# Patient Record
Sex: FEMALE | Race: WHITE | NOT HISPANIC OR LATINO | Employment: FULL TIME | URBAN - METROPOLITAN AREA
[De-identification: names, ages, dates, MRNs, and addresses within clinical notes are randomized per-mention and may not be internally consistent; named-entity substitution may affect disease eponyms.]

---

## 2017-11-06 ENCOUNTER — TRANSCRIBE ORDERS (OUTPATIENT)
Dept: ADMINISTRATIVE | Age: 27
End: 2017-11-06

## 2017-11-06 ENCOUNTER — APPOINTMENT (OUTPATIENT)
Dept: LAB | Age: 27
End: 2017-11-06
Attending: PREVENTIVE MEDICINE

## 2017-11-06 DIAGNOSIS — Z00.8 HEALTH EXAMINATION IN POPULATION SURVEY: ICD-10-CM

## 2017-11-06 DIAGNOSIS — Z00.8 HEALTH EXAMINATION IN POPULATION SURVEY: Primary | ICD-10-CM

## 2017-11-06 PROCEDURE — 86480 TB TEST CELL IMMUN MEASURE: CPT

## 2017-11-06 PROCEDURE — 36415 COLL VENOUS BLD VENIPUNCTURE: CPT

## 2017-11-08 LAB
ANNOTATION COMMENT IMP: NORMAL
GAMMA INTERFERON BACKGROUND BLD IA-ACNC: 0.04 IU/ML
M TB IFN-G BLD-IMP: NEGATIVE
M TB IFN-G CD4+ BCKGRND COR BLD-ACNC: 0.03 IU/ML
M TB IFN-G CD4+ T-CELLS BLD-ACNC: 0.07 IU/ML
MITOGEN IGNF BLD-ACNC: 9.66 IU/ML
QUANTIFERON-TB GOLD IN TUBE: NORMAL
SERVICE CMNT-IMP: NORMAL

## 2018-04-02 ENCOUNTER — OFFICE VISIT (OUTPATIENT)
Dept: FAMILY MEDICINE CLINIC | Facility: OTHER | Age: 28
End: 2018-04-02
Payer: COMMERCIAL

## 2018-04-02 VITALS
WEIGHT: 199.3 LBS | OXYGEN SATURATION: 98 % | HEART RATE: 69 BPM | SYSTOLIC BLOOD PRESSURE: 128 MMHG | HEIGHT: 68 IN | DIASTOLIC BLOOD PRESSURE: 80 MMHG | BODY MASS INDEX: 30.2 KG/M2 | TEMPERATURE: 99 F

## 2018-04-02 DIAGNOSIS — L30.9 ECZEMA, UNSPECIFIED TYPE: ICD-10-CM

## 2018-04-02 DIAGNOSIS — Z76.89 ENCOUNTER TO ESTABLISH CARE: Primary | ICD-10-CM

## 2018-04-02 PROBLEM — Z86.018 HISTORY OF BENIGN BREAST TUMOR: Status: ACTIVE | Noted: 2018-04-02

## 2018-04-02 PROCEDURE — 3008F BODY MASS INDEX DOCD: CPT | Performed by: FAMILY MEDICINE

## 2018-04-02 PROCEDURE — 99203 OFFICE O/P NEW LOW 30 MIN: CPT | Performed by: FAMILY MEDICINE

## 2018-04-02 PROCEDURE — 1036F TOBACCO NON-USER: CPT | Performed by: FAMILY MEDICINE

## 2018-04-02 RX ORDER — TRIAMCINOLONE ACETONIDE 0.25 MG/G
OINTMENT TOPICAL 2 TIMES DAILY
Qty: 80 G | Refills: 0 | Status: SHIPPED | OUTPATIENT
Start: 2018-04-02 | End: 2018-07-03 | Stop reason: ALTCHOICE

## 2018-04-02 NOTE — PROGRESS NOTES
Assessment/Plan:    Patient info provided today  Discussed about avoiding the possible irritant  Advised to use mild soap and keep hands moisturized  Use the ointment as directed for 1-2 weeks only  May use OTC antihistamine for the itching and rash as well  Follow up prn  Problem List Items Addressed This Visit     None      Visit Diagnoses     Encounter to establish care    -  Primary    Eczema, unspecified type        Relevant Medications    triamcinolone (KENALOG) 0 025 % ointment            Subjective:      Patient ID: Keiko Macario is a 34 y o  female  Pt is here to establish care today  She has been pretty healthy other than having history of breast benign tumor in the past that was surgically removed  She has regular periods and doing well otherwise  She has sensitive skin and lately has noted rash on hands and in between finger that is very itchy  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  She noted the more she uses the scrub that she gets more of this rash  It's only confined to her hands bilaterally  She has no fever or pain in the hands  There is no vesicles or open wounds  The following portions of the patient's history were reviewed and updated as appropriate: allergies, current medications, past family history, past medical history, past social history, past surgical history and problem list     Review of Systems   Constitutional: Negative for chills, fatigue and fever  HENT: Negative for congestion  Eyes: Negative for visual disturbance  Respiratory: Negative for cough and wheezing  Cardiovascular: Negative for chest pain and leg swelling  Gastrointestinal: Negative for abdominal pain, nausea and vomiting  Musculoskeletal: Negative for arthralgias and myalgias  Skin: Positive for rash  Negative for pallor and wound  Allergic/Immunologic: Positive for environmental allergies     Neurological: Negative for dizziness, light-headedness and headaches  Objective:      /80 (BP Location: Left arm, Patient Position: Sitting, Cuff Size: Adult)   Pulse 69   Temp 99 °F (37 2 °C) (Tympanic)   Ht 5' 8" (1 727 m)   Wt 90 4 kg (199 lb 4 8 oz)   SpO2 98%   BMI 30 30 kg/m²          Physical Exam   Constitutional: She is oriented to person, place, and time  She appears well-developed and well-nourished  No distress  HENT:   Head: Normocephalic and atraumatic  Eyes: Conjunctivae are normal  Right eye exhibits no discharge  Left eye exhibits no discharge  No scleral icterus  Cardiovascular: Normal rate, regular rhythm and normal heart sounds  No murmur heard  Pulmonary/Chest: Effort normal and breath sounds normal  No respiratory distress  She has no wheezes  Abdominal: Soft  Bowel sounds are normal  She exhibits no distension  There is no tenderness  Neurological: She is alert and oriented to person, place, and time  No cranial nerve deficit  Skin: Skin is warm and dry  Rash noted  She is not diaphoretic  There is maculopapular rash patches on dorsum of hands and in between fingers bilaterally  The rash is mild  No vesicles or pustules noted  Psychiatric: She has a normal mood and affect  Her behavior is normal    Nursing note and vitals reviewed

## 2018-04-02 NOTE — PATIENT INSTRUCTIONS
Contact Dermatitis   AMBULATORY CARE:   Contact dermatitis  is a rash  It develops when you touch something that irritates your skin or causes an allergic reaction  Common signs and symptoms include the following:   · Red, swollen, painful rash           · Skin that itches, stings, or burns    · Dry, scaly, or crusty skin patches    · Bumps or blisters    · Fluid draining from blisters  Call 911 for any of the following:   · You have sudden trouble breathing  · Your throat swells and you have trouble eating  · Your face is swollen  Contact your healthcare provider if:   · You have a fever  · Your blisters are draining pus  · Your rash spreads or does not get better, even after treatment  · You have questions or concerns about your condition or care  Treatment for contact dermatitis  involves removing any irritants or allergens that cause your rash  You may also need medicines to decrease itching and swelling  They will be given as a topical medicine to apply to your rash or as a pill  Manage contact dermatitis:   · Take short baths or showers in cool water  Use mild soap or soap-free cleansers  Add oatmeal, baking soda, or cornstarch to the bath water to help decrease skin irritation  · Avoid skin irritants , such as makeup, hair products, soaps, and cleansers  Use products that do not contain perfume or dye  · Apply a cool compress to your rash  This will help soothe your skin  · Keep your skin moist   Rub unscented cream or lotion on your skin to prevent dryness and itching  Do this right after a bath or shower when your skin is still damp  Follow up with your healthcare provider as directed:  Write down your questions so you remember to ask them during your visits  © 2017 [employer] Information is for End User's use only and may not be sold, redistributed or otherwise used for commercial purposes   All illustrations and images included in CareNotes® are the copyrighted property of [employer]  or Felice Boykin  The above information is an  only  It is not intended as medical advice for individual conditions or treatments  Talk to your doctor, nurse or pharmacist before following any medical regimen to see if it is safe and effective for you

## 2018-05-02 ENCOUNTER — TELEPHONE (OUTPATIENT)
Dept: FAMILY MEDICINE CLINIC | Facility: OTHER | Age: 28
End: 2018-05-02

## 2018-05-03 NOTE — TELEPHONE ENCOUNTER
She has already had the test in 11/2017 with normal and negative results  Does she need it repeated? Please let me know  Thanks

## 2018-05-07 ENCOUNTER — CLINICAL SUPPORT (OUTPATIENT)
Dept: FAMILY MEDICINE CLINIC | Facility: OTHER | Age: 28
End: 2018-05-07
Payer: COMMERCIAL

## 2018-05-07 DIAGNOSIS — Z11.1 PPD SCREENING TEST: Primary | ICD-10-CM

## 2018-05-07 PROCEDURE — 86580 TB INTRADERMAL TEST: CPT

## 2018-05-10 LAB
INDURATION: NORMAL MM
TB SKIN TEST: NEGATIVE

## 2018-07-03 ENCOUNTER — TELEPHONE (OUTPATIENT)
Dept: FAMILY MEDICINE CLINIC | Facility: OTHER | Age: 28
End: 2018-07-03

## 2018-07-03 DIAGNOSIS — L30.8 OTHER ECZEMA: Primary | ICD-10-CM

## 2018-07-03 RX ORDER — CLOBETASOL PROPIONATE 0.5 MG/G
CREAM TOPICAL 2 TIMES DAILY
Qty: 30 G | Refills: 0 | Status: SHIPPED | OUTPATIENT
Start: 2018-07-03 | End: 2021-06-24 | Stop reason: HOSPADM

## 2018-07-03 NOTE — TELEPHONE ENCOUNTER
Please advise the  Patient to stop using the first medication and to use the Clobetasol as directed  Thanks

## 2019-01-27 ENCOUNTER — APPOINTMENT (EMERGENCY)
Dept: RADIOLOGY | Facility: HOSPITAL | Age: 29
End: 2019-01-27
Payer: COMMERCIAL

## 2019-01-27 ENCOUNTER — HOSPITAL ENCOUNTER (EMERGENCY)
Facility: HOSPITAL | Age: 29
Discharge: HOME | End: 2019-01-27
Payer: COMMERCIAL

## 2019-01-27 VITALS
OXYGEN SATURATION: 98 % | RESPIRATION RATE: 18 BRPM | DIASTOLIC BLOOD PRESSURE: 65 MMHG | SYSTOLIC BLOOD PRESSURE: 137 MMHG | WEIGHT: 170 LBS | BODY MASS INDEX: 25.85 KG/M2 | HEART RATE: 77 BPM | TEMPERATURE: 98.3 F

## 2019-01-27 DIAGNOSIS — S89.91XA INJURY OF RIGHT KNEE, INITIAL ENCOUNTER: Primary | ICD-10-CM

## 2019-01-27 PROCEDURE — 73564 X-RAY EXAM KNEE 4 OR MORE: CPT

## 2019-01-27 PROCEDURE — 99283 EMERGENCY DEPT VISIT LOW MDM: CPT

## 2019-01-27 RX ORDER — NAPROXEN 500 MG/1
500 TABLET ORAL 2 TIMES DAILY WITH MEALS
Qty: 20 TABLET | Refills: 0 | Status: SHIPPED | OUTPATIENT
Start: 2019-01-27 | End: 2019-09-19 | Stop reason: ALTCHOICE

## 2019-01-27 NOTE — ED PROVIDER NOTES
History  Chief Complaint   Patient presents with    Knee Pain     Pt was dancing last night and injuried her R knee     Patient is a 51-year-old female reported to emergency room with complaint of right knee injury after she was dancing last night  Patient stated that she kneeled down on her both knees and she was coming up she heard a right knee popping  Had immediately after that she experience pain  Pain is sharp stabbing located to the medial aspect of her right knee  There is no swelling, erythema or warmth  Tenderness to palpation reported  Patient is limping however is able to ambulate  Patellar landmarks maintained  No pain in the posterior aspect of the knee or on the lateral aspect  Denies paresthesia, numbness, tingling to upper lower extremities  No posterior cuff tenderness, erythema or swelling  Past medical history noncontributory  Stable while in ED  X-ray shows no acute abnormalities  Ibuprofen or Tylenol for pain control  Knee immobilizer applied  Patient is walking and pain improved  Orthopedic referral provided as needed  Prior to Admission Medications   Prescriptions Last Dose Informant Patient Reported? Taking? clobetasol (TEMOVATE) 0 05 % cream   No Yes   Sig: Apply topically 2 (two) times a day      Facility-Administered Medications: None       Past Medical History:   Diagnosis Date    Eczema        Past Surgical History:   Procedure Laterality Date    BREAST SURGERY Left 2008       Family History   Problem Relation Age of Onset    Nephrolithiasis Mother     Hypertension Father     Obesity Father     Depression Father      I have reviewed and agree with the history as documented  Social History   Substance Use Topics    Smoking status: Former Smoker    Smokeless tobacco: Former User    Alcohol use Yes      Comment: social         Review of Systems   Constitutional: Negative  Respiratory: Negative  Cardiovascular: Negative  Gastrointestinal: Negative  Musculoskeletal: Positive for arthralgias and gait problem  Negative for joint swelling, neck pain and neck stiffness  Skin: Negative  Neurological: Negative for dizziness, weakness, light-headedness and numbness  Physical Exam  Physical Exam   Constitutional: She is oriented to person, place, and time  She appears well-developed and well-nourished  No distress  HENT:   Head: Normocephalic  Eyes: Pupils are equal, round, and reactive to light  EOM are normal    Neck: Normal range of motion  Cardiovascular: Normal rate and regular rhythm  Pulmonary/Chest: Effort normal and breath sounds normal    Abdominal: Soft  Musculoskeletal: Normal range of motion  She exhibits tenderness  She exhibits no edema or deformity  Neurological: She is alert and oriented to person, place, and time  She displays normal reflexes  No sensory deficit  She exhibits normal muscle tone  Skin: Skin is warm and dry  No rash noted  No erythema  Vital Signs  ED Triage Vitals [01/27/19 0835]   Temperature Pulse Respirations Blood Pressure SpO2   98 3 °F (36 8 °C) 77 18 137/65 98 %      Temp Source Heart Rate Source Patient Position - Orthostatic VS BP Location FiO2 (%)   Tympanic Monitor Sitting Left arm --      Pain Score       7           Vitals:    01/27/19 0835   BP: 137/65   Pulse: 77   Patient Position - Orthostatic VS: Sitting       Visual Acuity      ED Medications  Medications - No data to display    Diagnostic Studies  Results Reviewed     None                 XR knee 4+ vw right injury   ED Interpretation by Tamia Lugo PA-C (01/27 0920)   No acute fractures or dislocations                 Procedures  Procedures       Phone Contacts  ED Phone Contact    ED Course                               MDM  Number of Diagnoses or Management Options  Injury of right knee, initial encounter:   Diagnosis management comments: X-ray of the right knee pending    Examination unremarkable for fluid collection, joint infection  Supportive care such as ice, elevation rest recommended  Knee immobilizer provided  Ibuprofen or Tylenol for pain and discomfort recommended  Patient refused naproxen     Follow-up with orthopedics as needed  Amount and/or Complexity of Data Reviewed  Tests in the radiology section of CPT®: ordered and reviewed    Patient Progress  Patient progress: improved    CritCare Time    Disposition  Final diagnoses:   Injury of right knee, initial encounter     Time reflects when diagnosis was documented in both MDM as applicable and the Disposition within this note     Time User Action Codes Description Comment    1/27/2019  9:22 AM aDrrion Reynoso 81, Karen Injury of right knee, initial encounter       ED Disposition     ED Disposition Condition Comment    Discharge  [address] discharge to home/self care  Condition at discharge: Good  Follow-up with Orthopedics for further evaluation and management of worsening symptoms or no improvement in the next 5-7 days  Use knee immobilizer for no longer than 7 days  Ice , rest, elevation recommended  Take naproxen twice a day for total of 5-6 days or as needed  Take medication with food not an empty stomach  Return to emergency room with new or worsening symptoms  Follow-up Information     Follow up With Specialties Details Why [address], [place] Sports Medicine, Orthopedic Surgery In 1 week            Discharge Medication List as of 1/27/2019  9:25 AM      START taking these medications    Details   naproxen (NAPROSYN) 500 mg tablet Take 1 tablet (500 mg total) by mouth 2 (two) times a day with meals for 10 days, Starting Sun 1/27/2019, Until Wed 2/6/2019, Normal         CONTINUE these medications which have NOT CHANGED    Details   clobetasol (TEMOVATE) 0 05 % cream Apply topically 2 (two) times a day, Starting Tue 7/3/2018, Normal           No discharge procedures on file      ED Provider  Electronically Signed by           Trecia Gottron, PA-C  01/27/19 1111

## 2019-01-27 NOTE — ED NOTES
Jana Goel RN placed knee immobilizer on pt R leg     Jonas Allison, [place]  01/27/19 2122

## 2019-07-16 ENCOUNTER — TRANSCRIBE ORDERS (OUTPATIENT)
Dept: LAB | Facility: HOSPITAL | Age: 29
End: 2019-07-16

## 2019-07-16 ENCOUNTER — APPOINTMENT (OUTPATIENT)
Dept: LAB | Facility: HOSPITAL | Age: 29
End: 2019-07-16
Payer: COMMERCIAL

## 2019-07-16 DIAGNOSIS — Z00.8 HEALTH EXAMINATION IN POPULATION SURVEY: ICD-10-CM

## 2019-07-16 DIAGNOSIS — Z00.8 HEALTH EXAMINATION IN POPULATION SURVEY: Primary | ICD-10-CM

## 2019-07-16 LAB
CHOLEST SERPL-MCNC: 125 MG/DL (ref 50–200)
HDLC SERPL-MCNC: 59 MG/DL (ref 40–60)
LDLC SERPL CALC-MCNC: 50 MG/DL (ref 0–100)
NONHDLC SERPL-MCNC: 66 MG/DL
TRIGL SERPL-MCNC: 78 MG/DL

## 2019-07-16 PROCEDURE — 36415 COLL VENOUS BLD VENIPUNCTURE: CPT

## 2019-07-16 PROCEDURE — 83036 HEMOGLOBIN GLYCOSYLATED A1C: CPT

## 2019-07-16 PROCEDURE — 80061 LIPID PANEL: CPT

## 2019-07-17 LAB
EST. AVERAGE GLUCOSE BLD GHB EST-MCNC: 108 MG/DL
HBA1C MFR BLD: 5.4 % (ref 4.2–6.3)

## 2019-09-18 ENCOUNTER — TELEPHONE (OUTPATIENT)
Dept: FAMILY MEDICINE CLINIC | Facility: CLINIC | Age: 29
End: 2019-09-18

## 2019-09-18 NOTE — TELEPHONE ENCOUNTER
----- Message from [place] John DO sent at 9/18/2019  2:27 PM EDT -----  This patient has appointment with us and Aishwarya Frank tomorrow - please confirm where she is going

## 2019-09-18 NOTE — TELEPHONE ENCOUNTER
I left a message for the patient to please call the office regarding this appointment and that we are here until 7 this evening to call back

## 2019-09-19 ENCOUNTER — OFFICE VISIT (OUTPATIENT)
Dept: FAMILY MEDICINE CLINIC | Facility: CLINIC | Age: 29
End: 2019-09-19
Payer: COMMERCIAL

## 2019-09-19 ENCOUNTER — LAB (OUTPATIENT)
Dept: LAB | Facility: CLINIC | Age: 29
End: 2019-09-19
Payer: COMMERCIAL

## 2019-09-19 VITALS
TEMPERATURE: 98.3 F | BODY MASS INDEX: 26.92 KG/M2 | SYSTOLIC BLOOD PRESSURE: 104 MMHG | HEART RATE: 55 BPM | OXYGEN SATURATION: 98 % | WEIGHT: 177.6 LBS | HEIGHT: 68 IN | DIASTOLIC BLOOD PRESSURE: 68 MMHG

## 2019-09-19 DIAGNOSIS — Z12.4 CERVICAL CANCER SCREENING: ICD-10-CM

## 2019-09-19 DIAGNOSIS — M79.10 MYALGIA: ICD-10-CM

## 2019-09-19 DIAGNOSIS — E55.9 VITAMIN D DEFICIENCY: ICD-10-CM

## 2019-09-19 DIAGNOSIS — Z00.00 WELL ADULT EXAM: Primary | ICD-10-CM

## 2019-09-19 LAB — 25(OH)D3 SERPL-MCNC: 21.9 NG/ML (ref 30–100)

## 2019-09-19 PROCEDURE — 82306 VITAMIN D 25 HYDROXY: CPT

## 2019-09-19 PROCEDURE — 99385 PREV VISIT NEW AGE 18-39: CPT | Performed by: FAMILY MEDICINE

## 2019-09-19 PROCEDURE — 36415 COLL VENOUS BLD VENIPUNCTURE: CPT

## 2019-09-19 PROCEDURE — 86618 LYME DISEASE ANTIBODY: CPT

## 2019-09-19 NOTE — PATIENT INSTRUCTIONS
Please send us the last Tdap or Td date  Folic Acid recommendations  Folic acid is important early in pregnancy for the prevention of neural tube defects  It is recommended to take 400-800 mg daily while trying to get pregnant and throughout pregnancy  This is included in prenatal vitamins or you can buy a separate supplement if you are taking a regular multivitamin  or a pre- vitamin

## 2019-09-19 NOTE — PROGRESS NOTES
Assessment/Plan:   Problems/concerns addressed in this visit are addressed in the attached note  1  Well adult exam    2  Cervical cancer screening  Refer to GYN  She is SA  Is is not using any form of BC at this time but is not interested in anything with hormones  Did review Copper IUD if she was interested  Do recommend she take folic acid if not on [facility]    - Ambulatory referral to Gynecology; Future    3  Myalgia  Check labs   - Lyme Antibody Profile with reflex to WB; Future  - Vitamin D 25 hydroxy; Future    4  Vitamin D deficiency  Check labs   - Cholecalciferol (VITAMIN D3) 5000 units CAPS; Take 1 capsule (5,000 Units total) by mouth daily Over the counter  - Vitamin D 25 hydroxy; Future    Well adult exam  ·         Continue healthy diet   ·         Encourage exercise 4 times a week or more for minimum 30 minutes  ·         Continue to see dentist, wear seatbelt  ·         Health maintenance reviewed and up-to-date - she will get us the date of her last tetanus shot that she believes was a few years ago  Reviewed age appropriate health maintenance screenings and immunizations that are due, risks and benefits of these  Health Maintenance   Topic Date Due    BMI: Followup Plan  01/14/2010    DTaP,Tdap,and Td Vaccines (1 - Tdap) 01/14/2013    PAP SMEAR  01/14/2013    INFLUENZA VACCINE  07/01/2019    Depression Screening PHQ  09/19/2020    BMI: Adult  09/19/2020    Pneumococcal Vaccine: 65+ Years (1 of 2 - PCV13) 01/14/2057    Pneumococcal Vaccine: Pediatrics (0 to 5 Years) and At-Risk Patients (6 to 59 Years)  Aged Out    HEPATITIS B VACCINES  Aged Out     No follow-ups on file  Patient Instructions   Please send us the last Tdap or Td date  Folic Acid recommendations  Folic acid is important early in pregnancy for the prevention of neural tube defects  It is recommended to take 400-800 mg daily while trying to get pregnant and throughout pregnancy   This is included in prenatal vitamins or you can buy a separate supplement if you are taking a regular multivitamin  or a pre- vitamin  Subjective:    HPI     Wilder Braga is a 34 y o  female who presents today for a physical      Chief Complaint   Patient presents with    Back Pain     Patient said goes to the gym and thinks may have hurt is some time ago x 1 mo  Patient said is getting better but still bothers her   lymes disease     Patient said has hx/o lymes given treatment with negative repeat labs  Patient said was on vacation and want to be sure not lymes again  ---Above per clinical staff & reviewed  ---    Diet: healthy - yogurt and fruit, salad, chicken and potatoes   Exercise:  Cardio and weight training,   [employer]   Dental visits:  Yes   Seatbelt: yes     Concerns today: addressed in additional note today   In past year lost 25 pounds   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[facility] [employer]   No children   2 cats   OR nurse at [employer]     Uses clobetasol for scrubbing on hands   eucrissa also   Uses as needed   Derm in past     Last pap was about 4 -5 years ago - has had 3 normals  Normal periods - heavy cramps one day only     Strained her mid back a month ago - started feeling better almost when she made the appointment   Stiffness   If extended her neck or looked up sharp shooting pain   Laying back almost took her breath away  Concerned about Lyme disease   That was much worse pain           The following portions of the patient's history were reviewed and updated as appropriate: allergies, current medications, past family history, past medical history, past social history, past surgical history and problem list      Current Outpatient Medications   Medication Sig Dispense Refill    clobetasol (TEMOVATE) 0 05 % cream Apply topically 2 (two) times a day 30 g 0    Cholecalciferol (VITAMIN D3) 5000 units CAPS Take 1 capsule (5,000 Units total) by mouth daily Over the counter       No current facility-administered medications for this visit  Objective:      /68   Pulse 55   Temp 98 3 °F (36 8 °C)   Ht 5' 8 43" (1 738 m)   Wt 80 6 kg (177 lb 9 6 oz)   SpO2 98%   BMI 26 67 kg/m²     BP Readings from Last 3 Encounters:   09/19/19 104/68   01/27/19 137/65   04/02/18 128/80     Wt Readings from Last 3 Encounters:   09/19/19 80 6 kg (177 lb 9 6 oz)   01/27/19 77 1 kg (170 lb)   04/02/18 90 4 kg (199 lb 4 8 oz)       Review of Systems  all others negative - no chest pain, SOB, normal urine and bowels  no GERD  sleeping well  mood good  Physical Exam   Constitutional: Appears well-developed and well-nourished  HENT: Head: Normocephalic  Sclera clear  Right Ear: External ear normal  Tympanic membrane normal    Left Ear: External ear normal  Tympanic membrane normal    Nose: Nose normal  No mucosal edema, No rhinorrhea  Mouth/Throat: Oropharynx is clear and moist   Eyes: Normal conjunctiva  No erythema  No discharge  Neck: Neck supple  No thyromegaly  Cardiovascular: Normal rate, regular rhythm and normal heart sounds  No murmur  Pulmonary/Chest: Effort normal and breath sounds normal  No wheezes, rales, or rhonchi  Abdominal: Soft  Bowel sounds are normal  There is no tenderness  No hepatosplenomegaly  Musculoskeletal: No lower extremity edema  Lymphadenopathy: No cervical adenopathy  Neurological: Alert and oriented to person, place, and time  Skin: Skin is warm and dry  Psychiatric: Normal mood and affect   Behavior is normal  Thought content normal

## 2019-09-20 LAB — B BURGDOR IGG+IGM SER-ACNC: <0.91 ISR (ref 0–0.9)

## 2019-09-20 RX ORDER — MAG HYDROX/ALUMINUM HYD/SIMETH 400-400-40
5000 SUSPENSION, ORAL (FINAL DOSE FORM) ORAL DAILY
Start: 2019-09-20 | End: 2021-06-24 | Stop reason: HOSPADM

## 2019-09-20 NOTE — RESULT ENCOUNTER NOTE
Call pt- her lyme is negative  Her vitamin D is low   She can start over the counter vitamin D 5,000 iu daily and repeat lab check in 3 months or so to ensure it is back up to normal

## 2019-10-19 ENCOUNTER — OFFICE VISIT (OUTPATIENT)
Dept: URGENT CARE | Facility: CLINIC | Age: 29
End: 2019-10-19
Payer: COMMERCIAL

## 2019-10-19 VITALS
BODY MASS INDEX: 25.92 KG/M2 | RESPIRATION RATE: 18 BRPM | OXYGEN SATURATION: 98 % | DIASTOLIC BLOOD PRESSURE: 65 MMHG | HEART RATE: 72 BPM | HEIGHT: 69 IN | TEMPERATURE: 98.6 F | SYSTOLIC BLOOD PRESSURE: 129 MMHG | WEIGHT: 175 LBS

## 2019-10-19 DIAGNOSIS — K11.20 PAROTITIS: Primary | ICD-10-CM

## 2019-10-19 DIAGNOSIS — R59.9 SWOLLEN GLAND: ICD-10-CM

## 2019-10-19 PROCEDURE — 99213 OFFICE O/P EST LOW 20 MIN: CPT | Performed by: NURSE PRACTITIONER

## 2019-10-19 PROCEDURE — S9088 SERVICES PROVIDED IN URGENT: HCPCS | Performed by: NURSE PRACTITIONER

## 2019-10-19 NOTE — PATIENT INSTRUCTIONS
Warm salt water gargles  Ibuprofen as needed  Increase fluids  Sour hard candies to stimulate parotid glands as discussed  Will check Mumps swab as discussed     Follow up with PCP in 2-3 days

## 2019-10-19 NOTE — PROGRESS NOTES
[address]        NAME: Zuleyma Morley is a 34 y o  female  : 1990    MRN: [id number]  DATE: 2019  TIME: 9:49 AM    Assessment and Plan   1  Parotitis  - MISCELLANEOUS LAB TEST  Low suspicion of mumps but obtained buccal pcr swab  Patient Instructions     Warm salt water gargles  Ibuprofen as needed  Increase fluids  Sour hard candies to stimulate parotid glands as discussed  Will check Mumps swab as discussed  Follow up with PCP in 2-3 days        Chief Complaint     Chief Complaint   Patient presents with    Swollen Glands     yesterday under the right jaw line, pain  Pain with chewing  no other feelings of unwell except a headache, no fevers   took ibuprofen at 0430 today last           History of Present Illness       Here for swollen glands right jaw line  Slightly painful and more swollen today  Started yesterday  No fever    No headache  No recent illness   No rash  Mild fatigue  No other symptoms except swollen glands  Works in [employer]  Has had MMR vaccine  Had low titers 4 years ago and received booster vaccine  Feels fine but would like to have Mumps testing due to recent mumps outbreak        Review of Systems   Review of Systems   Constitutional: Positive for fatigue ("maybe slight")  Negative for fever  HENT: Negative for congestion and sore throat  Respiratory: Negative for cough and shortness of breath  Gastrointestinal: Negative for diarrhea, nausea and vomiting  Skin: Negative for rash  Allergic/Immunologic: Negative for immunocompromised state  Neurological: Negative for dizziness and headaches  Hematological: Positive for adenopathy           Current Medications       Current Outpatient Medications:     Cholecalciferol (VITAMIN D3) 5000 units CAPS, Take 1 capsule (5,000 Units total) by mouth daily Over the counter, Disp: , Rfl:     clobetasol (TEMOVATE) 0 05 % cream, Apply topically 2 (two) times a day, Disp: 30 g, Rfl: 0    Current Allergies     Allergies as of 10/19/2019    (No Known Allergies)            The following portions of the patient's history were reviewed and updated as appropriate: allergies, current medications, past family history, past medical history, past social history, past surgical history and problem list      Past Medical History:   Diagnosis Date    Eczema        Past Surgical History:   Procedure Laterality Date    BREAST SURGERY Left 2008    benign mass age 14        Family History   Problem Relation Age of Onset    Nephrolithiasis Mother     Hypertension Father     Obesity Father     Depression Father     Hypotension Maternal Grandfather     Melanoma Maternal Grandfather     Stroke Paternal Grandmother 79    Hypertension Paternal Grandmother     Heart disease Paternal Grandmother     Heart attack Paternal Grandfather 79         Medications have been verified  Objective   /65   Pulse 72   Temp 98 6 °F (37 °C) (Oral)   Resp 18   Ht 5' 9" (1 753 m)   Wt 79 4 kg (175 lb)   LMP 10/18/2019   SpO2 98%   BMI 25 84 kg/m²        Physical Exam     Physical Exam   Constitutional: She is oriented to person, place, and time  She appears well-developed and well-nourished  No distress  HENT:   Nose: Nose normal    Mouth/Throat: Oropharynx is clear and moist    Neck:   Enlarged , mildly tender right parotid gland   Cardiovascular: Normal rate and regular rhythm  Pulmonary/Chest: Effort normal and breath sounds normal  No respiratory distress  She has no wheezes  Neurological: She is alert and oriented to person, place, and time  Skin: Skin is warm and dry  No rash noted  No erythema  Vitals reviewed

## 2019-10-21 ENCOUNTER — PATIENT MESSAGE (OUTPATIENT)
Dept: FAMILY MEDICINE CLINIC | Facility: CLINIC | Age: 29
End: 2019-10-21

## 2019-10-21 NOTE — TELEPHONE ENCOUNTER
From: Ceci Gordon  To: Gentry BudDO  Sent: 10/21/2019 8:50 AM EDT  Subject: Non-Urgent Sachin Garza,    Since Friday the 18th, I have had enlarged salivary glands on the right side of my face  I went to [facility] on Saturday  I do not have a fever  I only have 10/10 pain when chewing or eating, and headaches  They took viral culture swabs to rule out Mumps  They NP I met with thinks it is just Parotitis  I am unsure when the facial swelling will subside  I called out of work today, worried people would think I have something more than just Parotitis  Also, the facial swelling is quite embarrassing  I have been doing what the NP has prescribed which is taking Ibuprofen, increasing fluids, and eating sour candies  My question is what is your recommendations about going to work? Should I stay home or is it safe to go into work? If I need to stay home for a few days I will need a doctors note from you  Please let me know  Thank you

## 2019-10-24 ENCOUNTER — TELEPHONE (OUTPATIENT)
Dept: FAMILY MEDICINE CLINIC | Facility: CLINIC | Age: 29
End: 2019-10-24

## 2019-10-24 LAB — MISCELLANEOUS LAB TEST RESULT: NORMAL

## 2019-10-24 NOTE — TELEPHONE ENCOUNTER
Received call from [place] dept of health  Buccal swab for mumps (+)  Left message for pt to return call  Called to [facility] and spoke with Ly Woods ([phone])  Advised of (+)  Pt developed symptoms 10/18  Seen in [facility] 10/19  Employee in [address] [place] [address]  Pt had advised that she had low titers in past and did receive booster approximately 4 years ago     Employee health will follow up

## 2020-06-27 ENCOUNTER — OFFICE VISIT (OUTPATIENT)
Dept: URGENT CARE | Facility: CLINIC | Age: 30
End: 2020-06-27
Payer: COMMERCIAL

## 2020-06-27 VITALS
BODY MASS INDEX: 26.81 KG/M2 | RESPIRATION RATE: 20 BRPM | HEIGHT: 69 IN | HEART RATE: 82 BPM | WEIGHT: 181 LBS | DIASTOLIC BLOOD PRESSURE: 59 MMHG | TEMPERATURE: 96.6 F | SYSTOLIC BLOOD PRESSURE: 121 MMHG

## 2020-06-27 DIAGNOSIS — M79.671 RIGHT FOOT PAIN: Primary | ICD-10-CM

## 2020-06-27 PROCEDURE — G0382 LEV 3 HOSP TYPE B ED VISIT: HCPCS | Performed by: PHYSICIAN ASSISTANT

## 2021-02-05 ENCOUNTER — ULTRASOUND (OUTPATIENT)
Dept: OBGYN CLINIC | Facility: CLINIC | Age: 31
End: 2021-02-05
Payer: COMMERCIAL

## 2021-02-05 VITALS — SYSTOLIC BLOOD PRESSURE: 112 MMHG | WEIGHT: 192 LBS | BODY MASS INDEX: 28.35 KG/M2 | DIASTOLIC BLOOD PRESSURE: 60 MMHG

## 2021-02-05 DIAGNOSIS — N91.2 AMENORRHEA: Primary | ICD-10-CM

## 2021-02-05 PROCEDURE — 99214 OFFICE O/P EST MOD 30 MIN: CPT | Performed by: OBSTETRICS & GYNECOLOGY

## 2021-02-05 PROCEDURE — 76830 TRANSVAGINAL US NON-OB: CPT | Performed by: OBSTETRICS & GYNECOLOGY

## 2021-02-08 NOTE — PROGRESS NOTES
Assessment/Plan:     @ 8 weeks 2 days by LMP [facility] 9/15/2021    Possible uterine myoma picture loaded in media; will re-evaluate at [facility] 12 week sono    Schedule NOB intake and physical      Subjective      Dorita Weiss is a 36 y o   LMP 2020 who presents with amenorrhea and positive pregnancy test       Cycle length: regular 28d  Pregnancy testing: at home  Pregnancy imaging: not done  The following portions of the patient's history were reviewed and updated as appropriate: allergies, current medications, past family history, past medical history, past social history, past surgical history and problem list     Review of Systems  Pertinent items are noted in HPI  Objective      /60 (BP Location: Left arm, Patient Position: Sitting, Cuff Size: Large)   Wt 87 1 kg (192 lb)   LMP 2020   BMI 28 35 kg/m²     General: alert and oriented, in no acute distress   Heart: regular rate and rhythm, S1, S2 normal, no murmur, click, rub or gallop   Lungs: clear to auscultation bilaterally   Abdomen: soft, non-tender, without masses or organomegaly     AMB US Pelvic Non OB    Date/Time: 2021 5:00 PM  Performed by: Bebo Raphael MD  Authorized by: Bebo Raphael MD   Universal Protocol:  Consent: Verbal consent obtained  Risks and benefits: risks, benefits and alternatives were discussed  Consent given by: patient  Time out: Immediately prior to procedure a "time out" was called to verify the correct patient, procedure, equipment, support staff and site/side marked as required    Patient understanding: patient states understanding of the procedure being performed  Patient identity confirmed: verbally with patient      Procedure details:     Indications comment:  Amenorrhea, + urine hCG    Technique:  Transvaginal US, Non-OB    Position: lithotomy exam    Uterine findings:     Adnexal mass: not identified      Myomas: identified (anterior uterine fundus; 1 5cm)    Cervix findings:      closed  Left ovary findings:     Left ovary:  Visualized    Cysts: not identified    Right ovary findings:     Right ovary:  Visualized    Cysts: not identified    Other findings:     Free pelvic fluid: not identified    Post-Procedure Details:     Impression:  Single viable intrauterine gestation CRL 1 42cm (EGA 7w 5d)  bpm + yolk sac    Tolerance:   Tolerated well, no immediate complications    Complications: no complications      ]

## 2021-02-15 ENCOUNTER — TELEMEDICINE (OUTPATIENT)
Dept: OBGYN CLINIC | Facility: CLINIC | Age: 31
End: 2021-02-15

## 2021-02-15 VITALS — WEIGHT: 192 LBS | BODY MASS INDEX: 28.35 KG/M2

## 2021-02-15 DIAGNOSIS — Z34.81 PRENATAL CARE, SUBSEQUENT PREGNANCY, FIRST TRIMESTER: Primary | ICD-10-CM

## 2021-02-15 PROCEDURE — OBC: Performed by: OBSTETRICS & GYNECOLOGY

## 2021-02-15 NOTE — PROGRESS NOTES
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[employer]  , 's name Paco Stephenson, works from home  2 cats at home  Advised not to change cat litter  * Accompanied by: self  *  *Hx of  delivery prior to 36 weeks 6 days: No  *Last Menstrual Period: Pt's LMP was: 20  *Ultrasound date: 21   8w2d    *Estimated date of delivery: 09/15/21     * Confirmed by: LMP    *Signs/Symptoms of Pregnancy   *Current pregnancy symptoms: Occasional nausea, started spacing meals, eating smaller meals more frequently  *Constipation - No   *Headaches - No   *Cramping/spotting - No   *PICA cravings - No    *Diabetes- If you answer YES to 1 of the following, please order 1 hour GTT, 50g    *History of GDM: No    *BMI >35: No    *History of PCOS and/or on Metformin: No    *Prior history of LGA/Macrosomia (>9lb): No      -If you answer YES to 2 or more of the following, please order 1 hour GTT, 50g    *BMI >30: No  *First degree relative with type 2 diabetes: No    *AMA with other risk factors: No    *History of CHTN, Hyperlipidemia, Elevated A1c: No    *High risk race (, , ,  or [place]): No    *Hypertension- if you answer yes, please order preeclampsia labs including 24 hour urine protein   *Hx of chronic HTN: No   *Hx of gestational HTN: No   *Hx of preeclampsia, eclampsia, or HELLP syndrome: No    *Infection Screening-    *Does the pt have a hx of MRSA?: No    *If yes- please follow MRSA protocol and obtain a nasal swab for MRSA culture   *History of herpes?: No   *Ok for blood transfusion: Yes    *Immunizations:   *Influenza vaccine given today:Recieved this season   *Discussed Tdap vaccine: Yes    *Interview education    *Baby and Me support center    *[facility]     *Discussed genetic testing: Yes  Referral placed  Understands time sensitive  Phone number provided        *Appointment at [facility] made: Patient to call      *Routine Prenatal lab work ordered: Yes     *Did patients risk factors prompt additional lab work at this time?: No     *Nurse/Family Partnership- pt may qualify No; referral placed No     *4 P's- substance abuse screening      Presently using? No    Past use? No    Partner using? No    Parents/Family using? No    *Details that I feel the provider should be aware of: None    PN1 visit scheduled  The patient was oriented to our practice and all questions were answered  This was a telephone intake which lasted approximately 45 minutes  [address] [facility] for delivery  Prenatal folder will be given at PN! 3/4/21       Interviewed by: Antonieta Welch

## 2021-02-15 NOTE — PATIENT INSTRUCTIONS
Pregnancy at 11 to [address]:   You are now at the end of your first trimester and entering your second trimester  Morning sickness usually goes away by this time  You may have other symptoms such as fatigue, frequent urination, and headaches  You may have gained 2 to 4 pounds by now  DISCHARGE INSTRUCTIONS:   Return to the emergency department if:   · You have pain or cramping in your abdomen or low back  · You have heavy vaginal bleeding or clotting  · You pass material that looks like tissue or large clots  Collect the material and bring it with you  Call your doctor or obstetrician if:   · You cannot keep food or drinks down, and you are losing weight  · You have light bleeding  · You have chills or a fever  · You have vaginal itching, burning, or pain  · You have yellow, green, white, or foul-smelling vaginal discharge  · You have pain or burning when you urinate, less urine than usual, or pink or bloody urine  · You have questions or concerns about your condition or care  How to care for yourself at this stage of your pregnancy:   · Get plenty of rest   You may feel more tired than normal  You may need to take naps or go to bed earlier  · Manage nausea and vomiting  Avoid fatty and spicy foods  Eat small meals throughout the day instead of large meals  Luz may help to decrease nausea  Ask your healthcare provider about other ways of decreasing nausea and vomiting  · Eat a variety of healthy foods  Healthy foods include fruits, vegetables, whole-grain breads, low-fat dairy foods, beans, lean meats, and fish  Drink liquids as directed  Ask how much liquid to drink each day and which liquids are best for you  Limit caffeine to less than 200 milligrams each day  Limit your intake of fish to 2 servings each week  Choose fish low in mercury such as canned light tuna, shrimp, salmon, cod, or tilapia   Do not  eat fish high in mercury such as swordfish, tilefish, payal mackerel, and shark  · Take prenatal vitamins as directed  Your need for certain vitamins and minerals, such as folic acid, increases during pregnancy  Prenatal vitamins provide some of the extra vitamins and minerals you need  Prenatal vitamins may also help to decrease the risk of certain birth defects  · Do not smoke  Smoking increases your risk of a miscarriage and other health problems during your pregnancy  Smoking can cause your baby to be born too early or weigh less at birth  Ask your healthcare provider for information if you need help quitting  · Do not drink alcohol  Alcohol passes from your body to your baby through the placenta  It can affect your baby's brain development and cause fetal alcohol syndrome (FAS)  FAS is a group of conditions that causes mental, behavior, and growth problems  · Talk to your healthcare provider before you take any medicines  Many medicines may harm your baby if you take them when you are pregnant  Do not take any medicines, vitamins, herbs, or supplements without first talking to your healthcare provider  Never use illegal or street drugs (such as marijuana or cocaine) while you are pregnant  Safety tips during pregnancy:   · Avoid hot tubs and saunas  Do not use a hot tub or sauna while you are pregnant, especially during your first trimester  Hot tubs and saunas may raise your baby's temperature and increase the risk of birth defects  · Avoid toxoplasmosis  This is an infection caused by eating raw meat or being around infected cat feces  It can cause birth defects, miscarriages, and other problems  Wash your hands after you touch raw meat  Make sure any meat is well-cooked before you eat it  Avoid raw eggs and unpasteurized milk  Use gloves or ask someone else to clean your cat's litter box while you are pregnant  Changes happening with your baby: Your baby has fully formed fingernails and toenails   Your baby's heartbeat can now be heard  Ask your healthcare provider if you can listen to your baby's heartbeat  By week 14, your baby is over 4 inches long from the top of the head to the rump (baby's bottom)  Your baby weighs over 3 ounces  Prenatal care:  Prenatal care is a series of visits with your healthcare provider throughout your pregnancy  During the first 28 weeks of your pregnancy, you will see your healthcare provider 1 time each month  Prenatal care can help prevent problems during pregnancy and childbirth  Your healthcare provider will check your blood pressure and weight  Your baby's heart rate will also be checked  You may also need the following at some visits:  · A pelvic exam  allows your healthcare provider to see your cervix (the bottom part of your uterus)  Your healthcare provider will use a speculum to open your vagina  He or she will check the size and shape of your uterus  · Blood tests  may be done to check for any of the following:     ? Gestational diabetes or anemia (low iron level)    ? Blood type or Rh factor, or certain birth defects    ? Immunity to certain diseases, such as chickenpox or rubella    ? An infection, such as a sexually transmitted infection, HIV, or hepatitis B    · Hepatitis B  may need to be prevented or treated  Hepatitis B is inflammation of the liver caused by the hepatitis B virus (HBV)  HBV can spread from a mother to her baby during delivery  You will be checked for HBV as early as possible in the first trimester of each pregnancy  You need the test even if you received the hepatitis B vaccine or were tested before  You may need to have an HBV infection treated before you give birth  · Urine tests  may also be done to check for sugar and protein  These can be signs of gestational diabetes or preeclampsia  Urine tests may also be done to check for signs of infection  · A fetal ultrasound  shows pictures of your baby inside your uterus   The pictures are used to check your baby's development, movement, and position  · Genetic disorder screening tests  may be offered to you  These tests check your baby's risk for genetic disorders such as Down syndrome  A screening test includes a blood test and ultrasound  Follow up with your doctor or obstetrician as directed:  Go to all prenatal visits  Write down your questions so you remember to ask them during your visits  © Copyright [address] Information is for End User's use only and may not be sold, redistributed or otherwise used for commercial purposes  All illustrations and images included in CareNotes® are the copyrighted property of [employer]  or [address] [place], [place] [address]dagoberto   The above information is an  only  It is not intended as medical advice for individual conditions or treatments  Talk to your doctor, nurse or pharmacist before following any medical regimen to see if it is safe and effective for you

## 2021-02-24 ENCOUNTER — LAB (OUTPATIENT)
Dept: LAB | Facility: CLINIC | Age: 31
End: 2021-02-24
Payer: COMMERCIAL

## 2021-02-24 DIAGNOSIS — Z34.81 PRENATAL CARE, SUBSEQUENT PREGNANCY, FIRST TRIMESTER: ICD-10-CM

## 2021-02-24 LAB
ABO GROUP BLD: NORMAL
BACTERIA UR QL AUTO: ABNORMAL /HPF
BASOPHILS # BLD AUTO: 0.04 THOUSANDS/ΜL (ref 0–0.1)
BASOPHILS NFR BLD AUTO: 0 % (ref 0–1)
BILIRUB UR QL STRIP: NEGATIVE
BLD GP AB SCN SERPL QL: NEGATIVE
CLARITY UR: CLEAR
COLOR UR: YELLOW
EOSINOPHIL # BLD AUTO: 0.11 THOUSAND/ΜL (ref 0–0.61)
EOSINOPHIL NFR BLD AUTO: 1 % (ref 0–6)
ERYTHROCYTE [DISTWIDTH] IN BLOOD BY AUTOMATED COUNT: 12.8 % (ref 11.6–15.1)
GLUCOSE UR STRIP-MCNC: NEGATIVE MG/DL
HCT VFR BLD AUTO: 36.7 % (ref 34.8–46.1)
HGB BLD-MCNC: 12.1 G/DL (ref 11.5–15.4)
HGB UR QL STRIP.AUTO: NEGATIVE
IMM GRANULOCYTES # BLD AUTO: 0.03 THOUSAND/UL (ref 0–0.2)
IMM GRANULOCYTES NFR BLD AUTO: 0 % (ref 0–2)
KETONES UR STRIP-MCNC: NEGATIVE MG/DL
LEUKOCYTE ESTERASE UR QL STRIP: ABNORMAL
LYMPHOCYTES # BLD AUTO: 2.91 THOUSANDS/ΜL (ref 0.6–4.47)
LYMPHOCYTES NFR BLD AUTO: 29 % (ref 14–44)
MCH RBC QN AUTO: 30 PG (ref 26.8–34.3)
MCHC RBC AUTO-ENTMCNC: 33 G/DL (ref 31.4–37.4)
MCV RBC AUTO: 91 FL (ref 82–98)
MONOCYTES # BLD AUTO: 0.7 THOUSAND/ΜL (ref 0.17–1.22)
MONOCYTES NFR BLD AUTO: 7 % (ref 4–12)
NEUTROPHILS # BLD AUTO: 6.38 THOUSANDS/ΜL (ref 1.85–7.62)
NEUTS SEG NFR BLD AUTO: 63 % (ref 43–75)
NITRITE UR QL STRIP: NEGATIVE
NON-SQ EPI CELLS URNS QL MICRO: ABNORMAL /HPF
NRBC BLD AUTO-RTO: 0 /100 WBCS
PH UR STRIP.AUTO: 6 [PH]
PLATELET # BLD AUTO: 273 THOUSANDS/UL (ref 149–390)
PMV BLD AUTO: 10.6 FL (ref 8.9–12.7)
PROT UR STRIP-MCNC: NEGATIVE MG/DL
RBC # BLD AUTO: 4.04 MILLION/UL (ref 3.81–5.12)
RBC #/AREA URNS AUTO: ABNORMAL /HPF
RH BLD: POSITIVE
SP GR UR STRIP.AUTO: 1.02 (ref 1–1.03)
SPECIMEN EXPIRATION DATE: NORMAL
UROBILINOGEN UR QL STRIP.AUTO: 0.2 E.U./DL
WBC # BLD AUTO: 10.17 THOUSAND/UL (ref 4.31–10.16)
WBC #/AREA URNS AUTO: ABNORMAL /HPF

## 2021-02-24 PROCEDURE — 36415 COLL VENOUS BLD VENIPUNCTURE: CPT

## 2021-02-24 PROCEDURE — 87086 URINE CULTURE/COLONY COUNT: CPT

## 2021-02-24 PROCEDURE — 81001 URINALYSIS AUTO W/SCOPE: CPT

## 2021-02-24 PROCEDURE — 80081 OBSTETRIC PANEL INC HIV TSTG: CPT

## 2021-02-25 LAB
HBV SURFACE AG SER QL: NORMAL
HIV 1+2 AB+HIV1 P24 AG SERPL QL IA: NORMAL
RPR SER QL: NORMAL
RUBV IGG SERPL IA-ACNC: 73.8 IU/ML

## 2021-02-26 ENCOUNTER — TELEPHONE (OUTPATIENT)
Dept: OBGYN CLINIC | Facility: CLINIC | Age: 31
End: 2021-02-26

## 2021-02-26 LAB — BACTERIA UR CULT: NORMAL

## 2021-02-26 NOTE — TELEPHONE ENCOUNTER
Spoke to pt she is aware all labs are WNL    ----- Message from Jeri Curling, MD sent at 2/26/2021 10:03 AM EST -----  Notify all normal for pregancy

## 2021-03-04 ENCOUNTER — TELEPHONE (OUTPATIENT)
Dept: OBGYN CLINIC | Facility: CLINIC | Age: 31
End: 2021-03-04

## 2021-03-04 ENCOUNTER — INITIAL PRENATAL (OUTPATIENT)
Dept: OBGYN CLINIC | Facility: CLINIC | Age: 31
End: 2021-03-04

## 2021-03-04 VITALS — BODY MASS INDEX: 28.41 KG/M2 | WEIGHT: 192.4 LBS | DIASTOLIC BLOOD PRESSURE: 70 MMHG | SYSTOLIC BLOOD PRESSURE: 120 MMHG

## 2021-03-04 DIAGNOSIS — Z01.419 ENCOUNTER FOR GYNECOLOGICAL EXAMINATION WITHOUT ABNORMAL FINDING: Primary | ICD-10-CM

## 2021-03-04 DIAGNOSIS — Z34.80 SUPERVISION OF OTHER NORMAL PREGNANCY: ICD-10-CM

## 2021-03-04 DIAGNOSIS — Z11.3 SCREENING FOR STDS (SEXUALLY TRANSMITTED DISEASES): ICD-10-CM

## 2021-03-04 LAB
SL AMB  POCT GLUCOSE, UA: NORMAL
SL AMB POCT URINE PROTEIN: NORMAL

## 2021-03-04 PROCEDURE — PNV: Performed by: NURSE PRACTITIONER

## 2021-03-04 PROCEDURE — 87591 N.GONORRHOEAE DNA AMP PROB: CPT | Performed by: NURSE PRACTITIONER

## 2021-03-04 PROCEDURE — G0145 SCR C/V CYTO,THINLAYER,RESCR: HCPCS | Performed by: NURSE PRACTITIONER

## 2021-03-04 PROCEDURE — 87491 CHLMYD TRACH DNA AMP PROBE: CPT | Performed by: NURSE PRACTITIONER

## 2021-03-04 NOTE — PATIENT INSTRUCTIONS
Pregnancy at 11 to [address]:   What changes are happening in my body? You are now at the end of your first trimester and entering your second trimester  Morning sickness usually goes away by this time  You may have other symptoms such as fatigue, frequent urination, and headaches  You may have gained 2 to 4 pounds by now  How do I care for myself at this stage of my pregnancy? · Get plenty of rest   You may feel more tired than normal  You may need to take naps or go to bed earlier  · Manage nausea and vomiting  Avoid fatty and spicy foods  Eat small meals throughout the day instead of large meals  Luz may help to decrease nausea  Ask your healthcare provider about other ways of decreasing nausea and vomiting  · Eat a variety of healthy foods  Healthy foods include fruits, vegetables, whole-grain breads, low-fat dairy foods, beans, lean meats, and fish  Drink liquids as directed  Ask how much liquid to drink each day and which liquids are best for you  Limit caffeine to less than 200 milligrams each day  Limit your intake of fish to 2 servings each week  Choose fish low in mercury such as canned light tuna, shrimp, salmon, cod, or tilapia  Do not  eat fish high in mercury such as swordfish, tilefish, payal mackerel, and shark  · Take prenatal vitamins as directed  Your need for certain vitamins and minerals, such as folic acid, increases during pregnancy  Prenatal vitamins provide some of the extra vitamins and minerals you need  Prenatal vitamins may also help to decrease the risk of certain birth defects  · Do not smoke  Smoking increases your risk of a miscarriage and other health problems during your pregnancy  Smoking can cause your baby to be born too early or weigh less at birth  Ask your healthcare provider for information if you need help quitting  · Do not drink alcohol  Alcohol passes from your body to your baby through the placenta   It can affect your baby's brain development and cause fetal alcohol syndrome (FAS)  FAS is a group of conditions that causes mental, behavior, and growth problems  · Talk to your healthcare provider before you take any medicines  Many medicines may harm your baby if you take them when you are pregnant  Do not take any medicines, vitamins, herbs, or supplements without first talking to your healthcare provider  Never use illegal or street drugs (such as marijuana or cocaine) while you are pregnant  What are some safety tips during pregnancy? · Avoid hot tubs and saunas  Do not use a hot tub or sauna while you are pregnant, especially during your first trimester  Hot tubs and saunas may raise your baby's temperature and increase the risk of birth defects  · Avoid toxoplasmosis  This is an infection caused by eating raw meat or being around infected cat feces  It can cause birth defects, miscarriages, and other problems  Wash your hands after you touch raw meat  Make sure any meat is well-cooked before you eat it  Avoid raw eggs and unpasteurized milk  Use gloves or ask someone else to clean your cat's litter box while you are pregnant  What changes are happening with my baby? Your baby has fully formed fingernails and toenails  Your baby's heartbeat can now be heard  Ask your healthcare provider if you can listen to your baby's heartbeat  By week 14, your baby is over 4 inches long from the top of the head to the rump (baby's bottom)  Your baby weighs over 3 ounces  What do I need to know about prenatal care? Prenatal care is a series of visits with your healthcare provider throughout your pregnancy  During the first 28 weeks of your pregnancy, you will see your healthcare provider 1 time each month  Prenatal care can help prevent problems during pregnancy and childbirth  Your healthcare provider will check your blood pressure and weight  Your baby's heart rate will also be checked   You may also need the following at some visits:  · A pelvic exam  allows your healthcare provider to see your cervix (the bottom part of your uterus)  Your healthcare provider will use a speculum to open your vagina  He or she will check the size and shape of your uterus  · Blood tests  may be done to check for any of the following:     ? Gestational diabetes or anemia (low iron level)    ? Blood type or Rh factor, or certain birth defects    ? Immunity to certain diseases, such as chickenpox or rubella    ? An infection, such as a sexually transmitted infection, HIV, or hepatitis B    · Hepatitis B  may need to be prevented or treated  Hepatitis B is inflammation of the liver caused by the hepatitis B virus (HBV)  HBV can spread from a mother to her baby during delivery  You will be checked for HBV as early as possible in the first trimester of each pregnancy  You need the test even if you received the hepatitis B vaccine or were tested before  You may need to have an HBV infection treated before you give birth  · Urine tests  may also be done to check for sugar and protein  These can be signs of gestational diabetes or preeclampsia  Urine tests may also be done to check for signs of infection  · A fetal ultrasound  shows pictures of your baby inside your uterus  The pictures are used to check your baby's development, movement, and position  · Genetic disorder screening tests  may be offered to you  These tests check your baby's risk for genetic disorders such as Down syndrome  A screening test includes a blood test and ultrasound  When should I seek immediate care? · You have pain or cramping in your abdomen or low back  · You have heavy vaginal bleeding or clotting  · You pass material that looks like tissue or large clots  Collect the material and bring it with you  When should I call my doctor or obstetrician? · You cannot keep food or drinks down, and you are losing weight      · You have light bleeding  · You have chills or a fever  · You have vaginal itching, burning, or pain  · You have yellow, green, white, or foul-smelling vaginal discharge  · You have pain or burning when you urinate, less urine than usual, or pink or bloody urine  · You have questions or concerns about your condition or care  CARE AGREEMENT:   You have the right to help plan your care  Learn about your health condition and how it may be treated  Discuss treatment options with your healthcare providers to decide what care you want to receive  You always have the right to refuse treatment  The above information is an  only  It is not intended as medical advice for individual conditions or treatments  Talk to your doctor, nurse or pharmacist before following any medical regimen to see if it is safe and effective for you  © Copyright [address] Information is for End User's use only and may not be sold, redistributed or otherwise used for commercial purposes   All illustrations and images included in CareNotes® are the copyrighted property of [employer]  or [address] [place] [address]

## 2021-03-04 NOTE — ASSESSMENT & PLAN NOTE
Dave López  is a 36 y o  [id number] @12w1d who presents for her initial prenatal visit  She has no complaints  Denies pelvic pain, bleeding, headaches  Exam WNL  [id number]  by bedside U/S  Patient has completed prenatal labs  O POS, RI, urine culture, HIV, RPR and Hep B negative  Pap and GC/CT sent today  Patient is scheduled for genetics at [facility] on 3/10/21  Discussed scheduling level 2 ultrasound with [facility]  S/P flu vaccine   Reviewed diet and activity recommendations, practice set up, visit intervals and reasons to call  RTO in 4 weeks  Blue packet given and reviewed

## 2021-03-04 NOTE — PROGRESS NOTES
Supervision of other normal pregnancy    Krystian Kendrick  is a 36 y o  [id number] @12w1d who presents for her initial prenatal visit  She has no complaints  Denies pelvic pain, bleeding, headaches  Exam WNL  [id number]  by bedside U/S  Patient has completed prenatal labs  O POS, RI, urine culture, HIV, RPR and Hep B negative  Pap and GC/CT sent today  Patient is scheduled for genetics at [facility] on 3/10/21  Discussed scheduling level 2 ultrasound with [facility]  S/P flu vaccine   Reviewed diet and activity recommendations, practice set up, visit intervals and reasons to call  RTO in 4 weeks  Blue packet given and reviewed        Diagnoses and all orders for this visit:    Encounter for gynecological examination without abnormal finding  -     Liquid-based pap, screening    Supervision of other normal pregnancy  -     POCT urine dip    Screening for STDs (sexually transmitted diseases)  -     Chlamydia/GC amplified DNA by PCR         Ebony English  1990    CC:  PN 1 exam    S:  Ebony English is a 36 y o  female here for PN 1 exam    Pregnancy Hx:  #1 SAB  # 2 TAB  # 3 current  Medical Hx:  None  Family Hx:  Negative     Current Outpatient Medications:     Prenatal MV-Min-Fe Fum-FA-DHA (PRENATAL+DHA PO), Take by mouth, Disp: , Rfl:     Cholecalciferol (VITAMIN D3) 5000 units CAPS, Take 1 capsule (5,000 Units total) by mouth daily Over the counter (Patient not taking: Reported on 2/5/2021), Disp: , Rfl:     clobetasol (TEMOVATE) 0 05 % cream, Apply topically 2 (two) times a day (Patient not taking: Reported on 2/5/2021), Disp: 30 g, Rfl: 0  Social History     Socioeconomic History    Marital status: /Civil Union     Spouse name: Not on file    Number of children: Not on file    Years of education: Not on file    Highest education level: Not on file   Occupational History    Not on file   Social Needs    Financial resource strain: Not on file    Food insecurity     Worry: Not on file     Inability: Not on file   Isaiah Strickland Transportation needs     Medical: Not on file     Non-medical: Not on file   Tobacco Use    Smoking status: Former Smoker     Packs/day: 1 00     Years: 3 00     Pack years: 3 00     Quit date: 2014     Years since quittin 1    Smokeless tobacco: Former User   Substance and Sexual Activity    Alcohol use: Yes     Frequency: 2-3 times a week     Drinks per session: 1 or 2     Binge frequency: Never     Comment: social     Drug use: No    Sexual activity: Yes   Lifestyle    Physical activity     Days per week: Not on file     Minutes per session: Not on file    Stress: Not on file   Relationships    Social connections     Talks on phone: Not on file     Gets together: Not on file     Attends Gnosticist service: Not on file     Active member of club or organization: Not on file     Attends meetings of clubs or organizations: Not on file     Relationship status: Not on file    Intimate partner violence     Fear of current or ex partner: Not on file     Emotionally abused: Not on file     Physically abused: Not on file     Forced sexual activity: Not on file   Other Topics Concern    Not on file   Social History Narrative    Not on file     Family History   Problem Relation Age of Onset    Nephrolithiasis Mother     Hypertension Father     Obesity Father     Depression Father     Nephrolithiasis Maternal Grandmother     Hypotension Maternal Grandfather     Melanoma Maternal Grandfather     Stroke Paternal Grandmother 79    Hypertension Paternal Grandmother     Heart disease Paternal Grandmother     Heart attack Paternal Grandfather 79    No Known Problems Sister      Past Medical History:   Diagnosis Date    Chlamydia infection     Eczema      Review of Systems   Constitutional: Negative for appetite change, fatigue and unexpected weight change  Respiratory: Negative for shortness of breath  Cardiovascular: Negative for chest pain and leg swelling    Breasts:  negative for tenderness or masses  Gastrointestinal: Negative for abdominal pain  Endocrine: Negative for cold intolerance and heat intolerance  Genitourinary: Negative for dyspareunia, dysuria, flank pain, frequency, genital sores, hematuria and pelvic pain  Musculoskeletal: Negative for back pain  Neurological: Negative for headaches  O:  Blood pressure 120/70, weight 87 3 kg (192 lb 6 4 oz), last menstrual period 12/09/2020  Patient appears well and is not in distress  ROM normal   Neck is supple without masses  Normal thyroid  Heart regular rate and rhythm  Lungs CTA bilaterally   Breasts are symmetrical without mass, tenderness, nipple discharge, skin changes or adenopathy  Abdomen is soft and nontender without masses  External genitals are normal without lesions or rashes  Urethral meatus and urethra are normal  Bladder is normal to palpation  Vagina is normal without discharge or bleeding  Cervix is normal without discharge or lesion  Uterus is normal, mobile, nontender without palpable mass  -12 week size  Adnexa are normal, nontender, without palpable mass     Skin warm and dry  Capillary refill < 2 seconds  Alert and oriented x 3 with normal affect

## 2021-03-04 NOTE — PROGRESS NOTES
Supervision of other normal pregnancy    Nando Trevizo  is a 36 y o  [id number] @12w1d who presents for her initial prenatal visit  She has no complaints  Denies pelvic pain, bleeding, headaches  Exam WNL  [id number]  by bedside U/S  Patient has completed prenatal labs  O POS, RI, urine culture, HIV, RPR and Hep B negative  Pap and GC/CT sent today  Patient is scheduled for genetics at [facility] on 3/10/21  Discussed scheduling level 2 ultrasound with [facility]  S/P flu vaccine   Reviewed diet and activity recommendations, practice set up, visit intervals and reasons to call  RTO in 4 weeks  Blue packet given and reviewed

## 2021-03-09 LAB
C TRACH DNA SPEC QL NAA+PROBE: NEGATIVE
N GONORRHOEA DNA SPEC QL NAA+PROBE: NEGATIVE

## 2021-03-09 NOTE — PROGRESS NOTES
Please refer to the [facility] ultrasound report in Ob Procedures for additional information regarding today's visit

## 2021-03-10 ENCOUNTER — TRANSCRIBE ORDERS (OUTPATIENT)
Dept: LAB | Facility: CLINIC | Age: 31
End: 2021-03-10

## 2021-03-10 ENCOUNTER — APPOINTMENT (OUTPATIENT)
Dept: LAB | Facility: CLINIC | Age: 31
End: 2021-03-10
Payer: COMMERCIAL

## 2021-03-10 ENCOUNTER — TELEPHONE (OUTPATIENT)
Dept: OBGYN CLINIC | Facility: CLINIC | Age: 31
End: 2021-03-10

## 2021-03-10 ENCOUNTER — ROUTINE PRENATAL (OUTPATIENT)
Dept: PERINATAL CARE | Facility: OTHER | Age: 31
End: 2021-03-10
Payer: COMMERCIAL

## 2021-03-10 VITALS
WEIGHT: 190.26 LBS | DIASTOLIC BLOOD PRESSURE: 80 MMHG | HEART RATE: 65 BPM | BODY MASS INDEX: 28.18 KG/M2 | HEIGHT: 69 IN | SYSTOLIC BLOOD PRESSURE: 121 MMHG

## 2021-03-10 DIAGNOSIS — Z34.81 PRENATAL CARE, SUBSEQUENT PREGNANCY, FIRST TRIMESTER: ICD-10-CM

## 2021-03-10 DIAGNOSIS — Z36.9 UNSPECIFIED ANTENATAL SCREENING: ICD-10-CM

## 2021-03-10 DIAGNOSIS — Z33.1 PREGNANT STATE, INCIDENTAL: ICD-10-CM

## 2021-03-10 DIAGNOSIS — Z3A.13 13 WEEKS GESTATION OF PREGNANCY: ICD-10-CM

## 2021-03-10 DIAGNOSIS — Z33.1 PREGNANT STATE, INCIDENTAL: Primary | ICD-10-CM

## 2021-03-10 DIAGNOSIS — Z36.82 ENCOUNTER FOR ANTENATAL SCREENING FOR NUCHAL TRANSLUCENCY: Primary | ICD-10-CM

## 2021-03-10 LAB
LAB AP GYN PRIMARY INTERPRETATION: NORMAL
Lab: NORMAL

## 2021-03-10 PROCEDURE — 36415 COLL VENOUS BLD VENIPUNCTURE: CPT

## 2021-03-10 PROCEDURE — 76813 OB US NUCHAL MEAS 1 GEST: CPT | Performed by: OBSTETRICS & GYNECOLOGY

## 2021-03-10 PROCEDURE — 99202 OFFICE O/P NEW SF 15 MIN: CPT | Performed by: OBSTETRICS & GYNECOLOGY

## 2021-03-10 NOTE — PROGRESS NOTES
[id number] lab ordered  Instructed patient on process for checking her OOP cost via [employer] [facility]  Provided [id number] instruction card toll free # [phone]  Patient made aware if [id number]  unable to give an estimate she will need to contact [facility] office prior to blood draw  Patient aware that  is provided by third party and is only an estimate of cost not a guarantee  Insurance may require prior authorization, if test drawn without prior authorization she will be responsible for full cost of test   For definitive OOP cost, lab deductible or if lab authorization is required patient encouraged to call her insurance provider  Explained customer service insurance phone # located on the back of her ID card  [facility] will have results in approximately 7-10 business days and will call patient or notify via [employer]  Patient aware viewing lab result reveals gender  Patient verbalized understanding of all instructions and no questions at this time

## 2021-03-10 NOTE — PROGRESS NOTES
Ultrasound Probe Disinfection    A transvaginal ultrasound was performed  Prior to use, disinfection was performed with High Level Disinfection Process (Trophon)  Probe serial number A2: [id number] was used        Matthieu Lopes  03/10/21  3:25 PM

## 2021-03-10 NOTE — LETTER
March 10, 2021     Ewelina Howard, [address]    Patient: Juliet Munguia   YOB: 1990   Date of Visit: 3/10/2021       Dear Dr Martinez Macdonald: Thank you for referring Juliet Munguia to me for evaluation  Below are my notes for this consultation  If you have questions, please do not hesitate to call me  I look forward to following your patient along with you  Sincerely,        Sanjay Cleveland MD        CC: No Recipients  Sanjay Cleveland MD  3/9/2021 11:47 AM  Sign when Signing Visit   Please refer to the [facility] ultrasound report in Ob Procedures for additional information regarding today's visit

## 2021-03-15 ENCOUNTER — TELEPHONE (OUTPATIENT)
Dept: OBGYN CLINIC | Facility: CLINIC | Age: 31
End: 2021-03-15

## 2021-03-15 LAB — MISCELLANEOUS LAB TEST RESULT: NORMAL

## 2021-03-15 NOTE — TELEPHONE ENCOUNTER
----- Message from Darrick Serrato sent at 3/13/2021  7:01 AM EST -----  Regarding: Non-Urgent Medical Question  Contact: [phone]  Claypal,    On Wednesday after my transvaginal examination at [facility], my vagina became irritated  I dont know if I was maybe allergic or had a sensitivity to the plastic probe cover but soon after the test it felt irritated  It does not burn when I pee, there is no bleeding, or abnornal discharge  Its just my labia that is irritated and inflammed  Do you have any recommendations that might help? Thank you!

## 2021-03-22 ENCOUNTER — TELEPHONE (OUTPATIENT)
Dept: PERINATAL CARE | Facility: CLINIC | Age: 31
End: 2021-03-22

## 2021-03-22 NOTE — TELEPHONE ENCOUNTER
I left a message for Ginette to notify her of the low risk result of her MaterniT 21  I informed her that if she wishes to know the sex of the baby, she would need to call back  Phone number left  I infomred her of the recommendation to have an MSAFP that should be ordered by her OB  Nurse line phone number left

## 2021-03-22 NOTE — TELEPHONE ENCOUNTER
----- Message from Uma Raymundo MD sent at 3/18/2021  9:28 AM EDT -----  I reviewed the lab study today and the results revealed low risks for trisomy 21, 25, 15, and sex chromosome aneuploidies

## 2021-03-30 ENCOUNTER — ROUTINE PRENATAL (OUTPATIENT)
Dept: OBGYN CLINIC | Facility: CLINIC | Age: 31
End: 2021-03-30

## 2021-03-30 VITALS — SYSTOLIC BLOOD PRESSURE: 118 MMHG | DIASTOLIC BLOOD PRESSURE: 68 MMHG | BODY MASS INDEX: 28.65 KG/M2 | WEIGHT: 194 LBS

## 2021-03-30 DIAGNOSIS — Z34.92 ENCOUNTER FOR PREGNANCY RELATED EXAMINATION IN SECOND TRIMESTER: ICD-10-CM

## 2021-03-30 DIAGNOSIS — Z34.80 SUPERVISION OF OTHER NORMAL PREGNANCY: Primary | ICD-10-CM

## 2021-03-30 PROCEDURE — PNV: Performed by: NURSE PRACTITIONER

## 2021-03-30 NOTE — PROGRESS NOTES
Pt presents for a pnv, she is doing well     S/p flu  Pap and GC all normal  MSAFP order slip given today

## 2021-03-30 NOTE — PROGRESS NOTES
Problem List Items Addressed This Visit        Other    Supervision of other normal pregnancy - Primary     Denies OB complaints  Denies contractions, cramping, leakage of fluid or vaginal bleeding  Neg cffDNA  Gender to be a surprise until reveal party at 22 weeks   AFP orders were provided today  S/p flu vaccine  Reviewed reasons to call                Other Visit Diagnoses     Encounter for pregnancy related examination in second trimester        Relevant Orders    Alpha fetoprotein, maternal      Urine dip with neg protein, neg glucose

## 2021-03-30 NOTE — ASSESSMENT & PLAN NOTE
Denies OB complaints  Denies contractions, cramping, leakage of fluid or vaginal bleeding  Neg cffDNA  Gender to be a surprise until reveal party at 22 weeks   AFP orders were provided today  S/p flu vaccine  Reviewed reasons to call

## 2021-04-12 ENCOUNTER — TELEMEDICINE (OUTPATIENT)
Dept: FAMILY MEDICINE CLINIC | Facility: CLINIC | Age: 31
End: 2021-04-12
Payer: COMMERCIAL

## 2021-04-12 VITALS — HEIGHT: 69 IN | BODY MASS INDEX: 28.73 KG/M2 | WEIGHT: 194 LBS

## 2021-04-12 DIAGNOSIS — Z33.1 INCIDENTAL PREGNANCY: ICD-10-CM

## 2021-04-12 DIAGNOSIS — R43.2 LOSS OF TASTE: ICD-10-CM

## 2021-04-12 DIAGNOSIS — R43.0 LOSS OF SENSE OF SMELL: Primary | ICD-10-CM

## 2021-04-12 DIAGNOSIS — R43.0 LOSS OF SENSE OF SMELL: ICD-10-CM

## 2021-04-12 PROCEDURE — 99214 OFFICE O/P EST MOD 30 MIN: CPT | Performed by: FAMILY MEDICINE

## 2021-04-12 PROCEDURE — U0003 INFECTIOUS AGENT DETECTION BY NUCLEIC ACID (DNA OR RNA); SEVERE ACUTE RESPIRATORY SYNDROME CORONAVIRUS 2 (SARS-COV-2) (CORONAVIRUS DISEASE [COVID-19]), AMPLIFIED PROBE TECHNIQUE, MAKING USE OF HIGH THROUGHPUT TECHNOLOGIES AS DESCRIBED BY CMS-2020-01-R: HCPCS | Performed by: FAMILY MEDICINE

## 2021-04-12 PROCEDURE — U0005 INFEC AGEN DETEC AMPLI PROBE: HCPCS | Performed by: FAMILY MEDICINE

## 2021-04-12 NOTE — PROGRESS NOTES
COVID-19 Outpatient Progress Note    Assessment/Plan:    Problem List Items Addressed This Visit     None      Visit Diagnoses     Loss of sense of smell    -  Primary    Relevant Orders    Novel Coronavirus (Covid-19),PCR [facility] - Collected at [facility]    Loss of taste        Relevant Orders    Novel Coronavirus (Covid-19),PCR [facility] - Collected at [facility]    Incidental pregnancy             Disposition:     Rest  Fluids  Tylenolas appropriate  Send for COVID testing  If positive will need to isolate for 10 days since start of symptoms  - suspect positive - may return to work on 4/19/2  Will write note once results are back  If negative call us if symptoms worsen or do no improve by 10 days  I have spent 10 minutes directly with the patient  Encounter provider Bayron El DO    Provider located at [address] [place] [id number] [phone]    Recent Visits  No visits were found meeting these conditions  Showing recent visits within past 7 days and meeting all other requirements     Today's Visits  Date Type Provider Dept   04/12/21 Telemedicine DO Rojelio Beauchamp   Showing today's visits and meeting all other requirements     Future Appointments  No visits were found meeting these conditions  Showing future appointments within next 150 days and meeting all other requirements      This virtual check-in was done via [employer] and patient was informed that this is a secure, HIPAA-compliant platform  She agrees to proceed  Patient agrees to participate in a virtual check in via telephone or video visit instead of presenting to the office to address urgent/immediate medical needs  Patient is aware this is a billable service  After connecting through [facility], the patient was identified by name and date of birth   Ajit Dorado was informed that this was a telemedicine visit and that the exam was being conducted confidentially over secure lines  My office door was closed  No one else was in the room  Pao Mcdowell acknowledged consent and understanding of privacy and security of the telemedicine visit  I informed the patient that I have reviewed her record in Epic and presented the opportunity for her to ask any questions regarding the visit today  The patient agreed to participate  Subjective:   Pao Mcdowell is a 36 y o  female who is concerned about COVID-19  Patient's symptoms include nasal congestion, anosmia and loss of taste  Patient denies fever, chills, fatigue, malaise, rhinorrhea, sore throat, cough, shortness of breath, chest tightness, abdominal pain, nausea, vomiting, diarrhea, myalgias and headaches       Date of symptom onset: 4/7/2021    Exposure:   Contact with a person who is under investigation (PUI) for or who is positive for COVID-19 within the last 14 days?: Yes    Hospitalized recently for fever and/or lower respiratory symptoms?: No      Currently a healthcare worker that is involved in direct patient care?: No      Works in a special setting where the risk of COVID-19 transmission may be high? (this may include long-term care, correctional and FPC facilities; homeless shelters; assisted-living facilities and group homes ): No      Resident in a special setting where the risk of COVID-19 transmission may be high? (this may include long-term care, correctional and FPC facilities; homeless shelters; assisted-living facilities and group homes ): No      17 wks pregnant   Wed congestion, loss of taste and smell starting friday  98 2   4/19/21 return to work       No results found for: [address] [facility] [address], [address] [facility], [address] [facility], [place] [address]  Past Medical History:   Diagnosis Date    Chlamydia infection     Eczema      Past Surgical History:   Procedure Laterality Date    BREAST SURGERY Left 2008    benign mass age 14    [address], 10 years ago    WISDOM TOOTH EXTRACTION       Current Outpatient Medications   Medication Sig Dispense Refill    Prenatal MV-Min-Fe Fum-FA-DHA (PRENATAL+DHA PO) Take by mouth      Cholecalciferol (VITAMIN D3) 5000 units CAPS Take 1 capsule (5,000 Units total) by mouth daily Over the counter (Patient not taking: Reported on 4/12/2021)      clobetasol (TEMOVATE) 0 05 % cream Apply topically 2 (two) times a day (Patient not taking: Reported on 4/12/2021) 30 g 0     No current facility-administered medications for this visit  No Known Allergies    Review of Systems   Constitutional: Negative for chills, fatigue and fever  HENT: Positive for congestion  Negative for rhinorrhea and sore throat  Respiratory: Negative for cough, chest tightness and shortness of breath  Gastrointestinal: Negative for abdominal pain, diarrhea, nausea and vomiting  Musculoskeletal: Negative for myalgias  Neurological: Negative for headaches  Objective:    Vitals:    04/12/21 0826   Weight: 88 kg (194 lb)   Height: 5' 9" (1 753 m)       Physical Exam  Constitutional:       Appearance: She is well-developed  HENT:      Head: Normocephalic  Right Ear: External ear normal       Left Ear: External ear normal       Nose: Nose normal    Eyes:      General: No scleral icterus  Right eye: No discharge  Left eye: No discharge  Pupils: Pupils are equal, round, and reactive to light  Neck:      Musculoskeletal: Normal range of motion  Pulmonary:      Effort: Pulmonary effort is normal  No respiratory distress  Skin:     General: Skin is warm and dry  Neurological:      Mental Status: She is alert and oriented to person, place, and time  Psychiatric:         Behavior: Behavior normal          Thought Content: Thought content normal        VIRTUAL VISIT DISCLAIMER    Margarita Quesada acknowledges that she has consented to an online visit or consultation   She understands that the online visit is based solely on information provided by her, and that, in the absence of a face-to-face physical evaluation by the physician, the diagnosis she receives is both limited and provisional in terms of accuracy and completeness  This is not intended to replace a full medical face-to-face evaluation by the physician  Baruch Cheadle understands and accepts these terms

## 2021-04-13 LAB — SARS-COV-2 RNA RESP QL NAA+PROBE: POSITIVE

## 2021-04-20 ENCOUNTER — APPOINTMENT (OUTPATIENT)
Dept: LAB | Facility: CLINIC | Age: 31
End: 2021-04-20
Payer: COMMERCIAL

## 2021-04-20 DIAGNOSIS — Z34.92 ENCOUNTER FOR PREGNANCY RELATED EXAMINATION IN SECOND TRIMESTER: ICD-10-CM

## 2021-04-20 PROCEDURE — 82105 ALPHA-FETOPROTEIN SERUM: CPT

## 2021-04-20 PROCEDURE — 36415 COLL VENOUS BLD VENIPUNCTURE: CPT

## 2021-04-22 LAB
2ND TRIMESTER 4 SCREEN SERPL-IMP: NORMAL
AFP ADJ MOM SERPL: 0.5
AFP INTERP AMN-IMP: NORMAL
AFP INTERP SERPL-IMP: NORMAL
AFP INTERP SERPL-IMP: NORMAL
AFP SERPL-MCNC: 21.5 NG/ML
AGE AT DELIVERY: 29.6 YR
GA METHOD: NORMAL
GA: 18.9 WEEKS
IDDM PATIENT QL: NO
MULTIPLE PREGNANCY: NO
NEURAL TUBE DEFECT RISK FETUS: NORMAL %

## 2021-04-26 ENCOUNTER — TELEPHONE (OUTPATIENT)
Dept: OBGYN CLINIC | Facility: CLINIC | Age: 31
End: 2021-04-26

## 2021-04-26 NOTE — TELEPHONE ENCOUNTER
Spoke to pt she is aware    ----- Message from [email or web address] sent at 4/23/2021  4:02 PM EDT -----  Negative AFP  Please notify patient

## 2021-04-27 ENCOUNTER — ROUTINE PRENATAL (OUTPATIENT)
Dept: OBGYN CLINIC | Facility: CLINIC | Age: 31
End: 2021-04-27

## 2021-04-27 VITALS — SYSTOLIC BLOOD PRESSURE: 110 MMHG | WEIGHT: 199 LBS | DIASTOLIC BLOOD PRESSURE: 62 MMHG | BODY MASS INDEX: 29.39 KG/M2

## 2021-04-27 DIAGNOSIS — L30.9 ECZEMA, UNSPECIFIED TYPE: ICD-10-CM

## 2021-04-27 DIAGNOSIS — Z34.80 SUPERVISION OF OTHER NORMAL PREGNANCY: Primary | ICD-10-CM

## 2021-04-27 PROCEDURE — PNV: Performed by: NURSE PRACTITIONER

## 2021-04-27 NOTE — PROGRESS NOTES
Problem List Items Addressed This Visit        Musculoskeletal and Integument    Eczema     Longstanding h/o eczema  Worsening since d/c'ing topical steroids due to preg  Recommended daily antihistamine and discussed meds safe in preg  Advised trial of topical hydrocortisone (OTC strength) and recommended use of an emollient at bedtime  She inquires about safety date of Eucrisa in preg - advised I am not familiar with this med  F/u with PCP/Derm and call us with any questions  I was unable to located data on this in Up To Date  The FDA's website states that "there is no available data with [facility] in pregnant women to inform the drug-associated risk for major birth defects and miscarriage  In animal reproduction studies, there were no adverse developmental effects observed with oral administration of crisaborole in pregnant rats and rabbits during organogenesis at doses up to 5 and 3 times, respectively, the maximum recommended human dose"  Other    Supervision of other normal pregnancy - Primary     Denies OB complaints  + fetal movement/pressure  Denies contractions, cramping, leakage of fluid or vaginal bleeding  L2 is scheduled  cffDNA testing was neg  Gender is a surprise until reveal party  S/p flu vaccine  Reviewed reasons to call                  Urine dip with neg protein, neg glucose

## 2021-04-27 NOTE — ASSESSMENT & PLAN NOTE
Longstanding h/o eczema  Worsening since d/c'ing topical steroids due to preg  Recommended daily antihistamine and discussed meds safe in preg  Advised trial of topical hydrocortisone (OTC strength) and recommended use of an emollient at bedtime  She inquires about safety date of Eucrisa in preg - advised I am not familiar with this med  F/u with PCP/Derm and call us with any questions  I was unable to located data on this in Up To Date  The FDA's website states that "there is no available data with [facility] in pregnant women to inform the drug-associated risk for major birth defects and miscarriage  In animal reproduction studies, there were no adverse developmental effects observed with oral administration of crisaborole in pregnant rats and rabbits during organogenesis at doses up to 5 and 3 times, respectively, the maximum recommended human dose"

## 2021-04-27 NOTE — ASSESSMENT & PLAN NOTE
Denies OB complaints  + fetal movement/pressure  Denies contractions, cramping, leakage of fluid or vaginal bleeding  L2 is scheduled  cffDNA testing was neg  Gender is a surprise until reveal party  S/p flu vaccine  Reviewed reasons to call

## 2021-04-29 ENCOUNTER — ROUTINE PRENATAL (OUTPATIENT)
Dept: PERINATAL CARE | Facility: OTHER | Age: 31
End: 2021-04-29
Payer: COMMERCIAL

## 2021-04-29 VITALS
DIASTOLIC BLOOD PRESSURE: 73 MMHG | WEIGHT: 199.74 LBS | SYSTOLIC BLOOD PRESSURE: 110 MMHG | BODY MASS INDEX: 29.58 KG/M2 | HEIGHT: 69 IN | HEART RATE: 70 BPM

## 2021-04-29 DIAGNOSIS — Z36.86 ENCOUNTER FOR ANTENATAL SCREENING FOR CERVICAL LENGTH: ICD-10-CM

## 2021-04-29 DIAGNOSIS — Z36.3 ENCOUNTER FOR ANTENATAL SCREENING FOR MALFORMATION USING ULTRASOUND: Primary | ICD-10-CM

## 2021-04-29 DIAGNOSIS — Z3A.20 20 WEEKS GESTATION OF PREGNANCY: ICD-10-CM

## 2021-04-29 PROCEDURE — 76805 OB US >/= 14 WKS SNGL FETUS: CPT | Performed by: OBSTETRICS & GYNECOLOGY

## 2021-04-29 PROCEDURE — 99213 OFFICE O/P EST LOW 20 MIN: CPT | Performed by: OBSTETRICS & GYNECOLOGY

## 2021-04-29 PROCEDURE — 76817 TRANSVAGINAL US OBSTETRIC: CPT | Performed by: OBSTETRICS & GYNECOLOGY

## 2021-04-29 NOTE — PROGRESS NOTES
The patient was seen today for an ultrasound  Please see ultrasound report (located under Ob Procedures) for additional details  Thank you very much for allowing us to participate in the care of this very nice patient  Should you have any questions, please do not hesitate to contact me  Tonny Brannon MD [address] [facility]  Attending Physician, [place]

## 2021-04-29 NOTE — PROGRESS NOTES
Ultrasound Probe Disinfection    A transvaginal ultrasound was performed  Prior to use, disinfection was performed with High Level Disinfection Process (Trophon)  Probe serial number A1: [id number] was used        Alyne Bernheim  04/29/21  2:23 PM

## 2021-05-25 ENCOUNTER — ROUTINE PRENATAL (OUTPATIENT)
Dept: OBGYN CLINIC | Facility: CLINIC | Age: 31
End: 2021-05-25

## 2021-05-25 VITALS — DIASTOLIC BLOOD PRESSURE: 80 MMHG | BODY MASS INDEX: 30.6 KG/M2 | SYSTOLIC BLOOD PRESSURE: 124 MMHG | WEIGHT: 207.2 LBS

## 2021-05-25 DIAGNOSIS — Z34.92 ENCOUNTER FOR PREGNANCY RELATED EXAMINATION IN SECOND TRIMESTER: ICD-10-CM

## 2021-05-25 DIAGNOSIS — Z34.80 SUPERVISION OF OTHER NORMAL PREGNANCY: Primary | ICD-10-CM

## 2021-05-25 PROBLEM — U07.1 COVID-19 AFFECTING PREGNANCY, ANTEPARTUM: Status: ACTIVE | Noted: 2021-05-25

## 2021-05-25 PROBLEM — O98.519 COVID-19 AFFECTING PREGNANCY, ANTEPARTUM: Status: ACTIVE | Noted: 2021-05-25

## 2021-05-25 LAB
SL AMB  POCT GLUCOSE, UA: NORMAL
SL AMB POCT URINE PROTEIN: NORMAL

## 2021-05-25 PROCEDURE — PNV: Performed by: NURSE PRACTITIONER

## 2021-05-25 NOTE — PROGRESS NOTES
Problem List Items Addressed This Visit        Other    Supervision of other normal pregnancy - Primary     Denies OB complaints  Good fetal movement  Denies contractions, cramping, leakage of fluid or vaginal bleeding  L2 was WNL - baby girl, [place]  32 week growth and TRENT are scheduled due to maternal h/o covid-19   28 wk labs slips were provided  Discussed tdap vaccine for future visit  Reviewed PTL precautions and reasons to call                Other Visit Diagnoses     Encounter for pregnancy related examination in second trimester        Relevant Orders    POCT urine dip (Completed)    CBC and differential    Glucose, 1H PG    RPR

## 2021-05-25 NOTE — PROGRESS NOTES
Patient presents for a routine prenatal visit    23W 6D  Good Fetal Movement  No LOF,bleeding, cramping or discharge  No current complaints at this time  Urine-Neg  28 week labs given at today's visit

## 2021-05-25 NOTE — ASSESSMENT & PLAN NOTE
Denies OB complaints  Good fetal movement  Denies contractions, cramping, leakage of fluid or vaginal bleeding  L2 was WNL - baby girl, Hung [place]  32 week growth and TRENT are scheduled due to maternal h/o covid-19   28 wk labs slips were provided  Discussed tdap vaccine for future visit  Reviewed PTL precautions and reasons to call

## 2021-06-22 ENCOUNTER — APPOINTMENT (OUTPATIENT)
Dept: LAB | Facility: CLINIC | Age: 31
End: 2021-06-22
Payer: COMMERCIAL

## 2021-06-22 DIAGNOSIS — Z34.92 ENCOUNTER FOR PREGNANCY RELATED EXAMINATION IN SECOND TRIMESTER: ICD-10-CM

## 2021-06-22 LAB
BASOPHILS # BLD AUTO: 0.06 THOUSANDS/ΜL (ref 0–0.1)
BASOPHILS NFR BLD AUTO: 1 % (ref 0–1)
EOSINOPHIL # BLD AUTO: 0.28 THOUSAND/ΜL (ref 0–0.61)
EOSINOPHIL NFR BLD AUTO: 2 % (ref 0–6)
ERYTHROCYTE [DISTWIDTH] IN BLOOD BY AUTOMATED COUNT: 13.2 % (ref 11.6–15.1)
GLUCOSE 1H P 50 G GLC PO SERPL-MCNC: 108 MG/DL (ref 40–134)
HCT VFR BLD AUTO: 36.5 % (ref 34.8–46.1)
HGB BLD-MCNC: 12.2 G/DL (ref 11.5–15.4)
IMM GRANULOCYTES # BLD AUTO: 0.12 THOUSAND/UL (ref 0–0.2)
IMM GRANULOCYTES NFR BLD AUTO: 1 % (ref 0–2)
LYMPHOCYTES # BLD AUTO: 2.58 THOUSANDS/ΜL (ref 0.6–4.47)
LYMPHOCYTES NFR BLD AUTO: 20 % (ref 14–44)
MCH RBC QN AUTO: 31 PG (ref 26.8–34.3)
MCHC RBC AUTO-ENTMCNC: 33.4 G/DL (ref 31.4–37.4)
MCV RBC AUTO: 93 FL (ref 82–98)
MONOCYTES # BLD AUTO: 0.73 THOUSAND/ΜL (ref 0.17–1.22)
MONOCYTES NFR BLD AUTO: 6 % (ref 4–12)
NEUTROPHILS # BLD AUTO: 9.22 THOUSANDS/ΜL (ref 1.85–7.62)
NEUTS SEG NFR BLD AUTO: 70 % (ref 43–75)
NRBC BLD AUTO-RTO: 0 /100 WBCS
PLATELET # BLD AUTO: 261 THOUSANDS/UL (ref 149–390)
PMV BLD AUTO: 11 FL (ref 8.9–12.7)
RBC # BLD AUTO: 3.93 MILLION/UL (ref 3.81–5.12)
WBC # BLD AUTO: 12.99 THOUSAND/UL (ref 4.31–10.16)

## 2021-06-22 PROCEDURE — 36415 COLL VENOUS BLD VENIPUNCTURE: CPT

## 2021-06-22 PROCEDURE — 82950 GLUCOSE TEST: CPT

## 2021-06-22 PROCEDURE — 85025 COMPLETE CBC W/AUTO DIFF WBC: CPT

## 2021-06-22 PROCEDURE — 86592 SYPHILIS TEST NON-TREP QUAL: CPT

## 2021-06-23 LAB — RPR SER QL: NORMAL

## 2021-06-24 ENCOUNTER — ROUTINE PRENATAL (OUTPATIENT)
Dept: OBGYN CLINIC | Facility: CLINIC | Age: 31
End: 2021-06-24

## 2021-06-24 VITALS — SYSTOLIC BLOOD PRESSURE: 122 MMHG | WEIGHT: 211.2 LBS | BODY MASS INDEX: 31.19 KG/M2 | DIASTOLIC BLOOD PRESSURE: 66 MMHG

## 2021-06-24 DIAGNOSIS — Z34.80 SUPERVISION OF OTHER NORMAL PREGNANCY: ICD-10-CM

## 2021-06-24 DIAGNOSIS — U07.1 COVID-19 AFFECTING PREGNANCY, ANTEPARTUM: ICD-10-CM

## 2021-06-24 DIAGNOSIS — O98.519 COVID-19 AFFECTING PREGNANCY, ANTEPARTUM: ICD-10-CM

## 2021-06-24 DIAGNOSIS — Z34.92 ENCOUNTER FOR PREGNANCY RELATED EXAMINATION IN SECOND TRIMESTER: Primary | ICD-10-CM

## 2021-06-24 LAB
SL AMB  POCT GLUCOSE, UA: NORMAL
SL AMB POCT URINE PROTEIN: NORMAL

## 2021-06-24 PROCEDURE — PNV: Performed by: STUDENT IN AN ORGANIZED HEALTH CARE EDUCATION/TRAINING PROGRAM

## 2021-06-24 NOTE — PROGRESS NOTES
Pt presents for routine prenatal visit   Denies any bleeding, cramping, LOF   +FM   28 week labs done

## 2021-06-24 NOTE — PROGRESS NOTES
COVID-19 affecting pregnancy, antepartum  Plan 32 wk growth    Supervision of other normal pregnancy  35 yo  at 28+1 here for routine ob visit  Feeling well  No contractions, leaking or bleeding  Good fetal movement  28 wk labs normal  tdap VIS wants to consider  Return in 2 wks  The patient was counseled about the labor process  She was counseled regarding potential reasons that she may need a  section including arrest of dilation/descent, non-reassuring fetal status, or worsening maternal status  She was counseled on the risks of  including bleeding, infection, and injury to surrounding structures including the bowel and bladder  She was counseled that there are medical and surgical methods to manage excessive postpartum bleeding  She was counseled that in the event of excessive blood loss, she may require blood transfusion which includes a small risk of blood borne diseases such as hepatitis and HIV  The patient is OK with receiving a blood transfusion if necessary  The patient had an opportunity to ask questions and signed consent  She was counseled about the possible need for operative delivery using the vacuum or forceps and the indications for doing so  She was counseled that there is a small risk of  complications including intracranial hemorrhage, lacerations, nerve damage or fracture as well as the increased risk for more severe maternal laceration  The patient signed consent

## 2021-06-24 NOTE — ASSESSMENT & PLAN NOTE
37 yo  at 28+1 here for routine ob visit  Feeling well  No contractions, leaking or bleeding  Good fetal movement  28 wk labs normal  tdap VIS wants to consider  Return in 2 wks  The patient was counseled about the labor process  She was counseled regarding potential reasons that she may need a  section including arrest of dilation/descent, non-reassuring fetal status, or worsening maternal status  She was counseled on the risks of  including bleeding, infection, and injury to surrounding structures including the bowel and bladder  She was counseled that there are medical and surgical methods to manage excessive postpartum bleeding  She was counseled that in the event of excessive blood loss, she may require blood transfusion which includes a small risk of blood borne diseases such as hepatitis and HIV  The patient is OK with receiving a blood transfusion if necessary  The patient had an opportunity to ask questions and signed consent  She was counseled about the possible need for operative delivery using the vacuum or forceps and the indications for doing so  She was counseled that there is a small risk of  complications including intracranial hemorrhage, lacerations, nerve damage or fracture as well as the increased risk for more severe maternal laceration  The patient signed consent

## 2021-07-08 ENCOUNTER — ROUTINE PRENATAL (OUTPATIENT)
Dept: OBGYN CLINIC | Facility: CLINIC | Age: 31
End: 2021-07-08
Payer: COMMERCIAL

## 2021-07-08 VITALS — SYSTOLIC BLOOD PRESSURE: 106 MMHG | DIASTOLIC BLOOD PRESSURE: 56 MMHG | BODY MASS INDEX: 31.69 KG/M2 | WEIGHT: 214.6 LBS

## 2021-07-08 DIAGNOSIS — Z23 NEED FOR TDAP VACCINATION: ICD-10-CM

## 2021-07-08 DIAGNOSIS — O98.519 COVID-19 AFFECTING PREGNANCY, ANTEPARTUM: ICD-10-CM

## 2021-07-08 DIAGNOSIS — Z34.80 SUPERVISION OF OTHER NORMAL PREGNANCY: ICD-10-CM

## 2021-07-08 DIAGNOSIS — U07.1 COVID-19 AFFECTING PREGNANCY, ANTEPARTUM: ICD-10-CM

## 2021-07-08 DIAGNOSIS — Z34.83 PRENATAL CARE, SUBSEQUENT PREGNANCY, THIRD TRIMESTER: Primary | ICD-10-CM

## 2021-07-08 LAB
SL AMB  POCT GLUCOSE, UA: NEGATIVE
SL AMB POCT URINE PROTEIN: NEGATIVE

## 2021-07-08 PROCEDURE — 90471 IMMUNIZATION ADMIN: CPT

## 2021-07-08 PROCEDURE — PNV: Performed by: NURSE PRACTITIONER

## 2021-07-08 PROCEDURE — 90715 TDAP VACCINE 7 YRS/> IM: CPT

## 2021-07-08 NOTE — PATIENT INSTRUCTIONS
Pregnancy at 32 to 30 [address]:   What changes are happening in my body? You may notice new symptoms such as shortness of breath, heartburn, or swelling of your ankles and feet  You may also have trouble sleeping or contractions  How do I care for myself at this stage of my pregnancy? · Eat a variety of healthy foods  Healthy foods include fruits, vegetables, whole-grain breads, low-fat dairy foods, beans, lean meats, and fish  Drink liquids as directed  Ask how much liquid to drink each day and which liquids are best for you  Limit caffeine to less than 200 milligrams each day  Limit your intake of fish to 2 servings each week  Choose fish low in mercury such as canned light tuna, shrimp, salmon, cod, or tilapia  Do not  eat fish high in mercury such as swordfish, tilefish, payal mackerel, and shark  · Manage heartburn  by eating 4 or 5 small meals each day instead of large meals  Avoid spicy food  · Manage swelling  by lying down and putting your feet up  · Take prenatal vitamins as directed  Your need for certain vitamins and minerals, such as folic acid, increases during pregnancy  Prenatal vitamins provide some of the extra vitamins and minerals you need  Prenatal vitamins may also help to decrease the risk of certain birth defects  · Talk to your healthcare provider about exercise  Moderate exercise can help you stay fit  Your healthcare provider will help you plan an exercise program that is safe for you during pregnancy  · Do not smoke  Smoking increases your risk of a miscarriage and other health problems during your pregnancy  Smoking can cause your baby to be born too early or weigh less at birth  Ask your healthcare provider for information if you need help quitting  · Do not drink alcohol  Alcohol passes from your body to your baby through the placenta  It can affect your baby's brain development and cause fetal alcohol syndrome (FAS)   FAS is a group of conditions that causes mental, behavior, and growth problems  · Talk to your healthcare provider before you take any medicines  Many medicines may harm your baby if you take them when you are pregnant  Do not take any medicines, vitamins, herbs, or supplements without first talking to your healthcare provider  Never use illegal or street drugs (such as marijuana or cocaine) while you are pregnant  What are some safety tips during pregnancy? · Avoid hot tubs and saunas  Do not use a hot tub or sauna while you are pregnant, especially during your first trimester  Hot tubs and saunas may raise your baby's temperature and increase the risk of birth defects  · Avoid toxoplasmosis  This is an infection caused by eating raw meat or being around infected cat feces  It can cause birth defects, miscarriages, and other problems  Wash your hands after you touch raw meat  Make sure any meat is well-cooked before you eat it  Avoid raw eggs and unpasteurized milk  Use gloves or ask someone else to clean your cat's litter box while you are pregnant  What changes are happening with my baby? By 30 weeks, your baby may weigh more than 3 pounds  Your baby may be about 11 inches long from the top of the head to the rump (baby's bottom)  Your baby's eyes open and close now  Your baby's kicks and movements are more forceful at this time  What do I need to know about prenatal care? Your healthcare provider will check your blood pressure and weight  You may also need the following:  · Blood tests  may be done to check for anemia or blood type  · A urine test  may also be done to check for sugar and protein  These can be signs of gestational diabetes or infection  Protein in your urine may also be a sign of preeclampsia  Preeclampsia is a condition that can develop during week 20 or later of your pregnancy  It causes high blood pressure, and it can cause problems with your kidneys and other organs  · A Tdap vaccine and flu vaccine  may be recommended by your healthcare provider  · A gestational diabetes screen  will be done using an oral glucose tolerance test (OGTT)  An OGTT starts with a blood sugar level check after you have not eaten for 8 hours  You are then given a glucose drink  Your blood sugar level is checked after 1 hour, 2 hours, and sometimes 3 hours  Healthcare providers look at how much your blood sugar level increases from the first check  · Fundal height  is a measurement of your uterus to check your baby's growth  This number is usually the same as the number of weeks that you have been pregnant  Your healthcare provider may also check your baby's position  · Your baby's heart rate  will be checked  When should I seek immediate care? · You develop a severe headache that does not go away  · You have new or increased vision changes, such as blurred or spotted vision  · You have new or increased swelling in your face or hands  · You have vaginal spotting or bleeding  · Your water broke or you feel warm water gushing or trickling from your vagina  When should I contact my healthcare provider? · You have more than 5 contractions in 1 hour  · You notice any changes in your baby's movements  · You have abdominal cramps, pressure, or tightening  · You have a change in vaginal discharge  · You have chills or a fever  · You have vaginal itching, burning, or pain  · You have yellow, green, white, or foul-smelling vaginal discharge  · You have pain or burning when you urinate, less urine than usual, or pink or bloody urine  · You have questions or concerns about your condition or care  CARE AGREEMENT:   You have the right to help plan your care  Learn about your health condition and how it may be treated  Discuss treatment options with your healthcare providers to decide what care you want to receive   You always have the right to refuse treatment  The above information is an  only  It is not intended as medical advice for individual conditions or treatments  Talk to your doctor, nurse or pharmacist before following any medical regimen to see if it is safe and effective for you  © Copyright [address] Information is for End User's use only and may not be sold, redistributed or otherwise used for commercial purposes   All illustrations and images included in CareNotes® are the copyrighted property of A D A M , Inc  or [address] [place] [address]

## 2021-07-08 NOTE — ASSESSMENT & PLAN NOTE
Ophelia Beverly  is a 36 y o   @30w1d who presents for routine prenatal visit  Denies LOF, vaginal bleeding, regular uterine contractions, cramping, headaches or visual changes  Reports good fetal movement  Taking PNV daily as prescribed  TDap given today  Declined COVID vaccine  Reviewed PTL/Labor precautions and [facility]  Normal anatomy scan  Growth scheduled for   Plans to breastfeed    Having a girl  28 week labs-all normal 1 hour 108 and normal H/H  Has yellow packet

## 2021-07-08 NOTE — PROGRESS NOTES
Patient here for PN visit  She denies spotting, cramping, or LOF  Good fetal movement  It's a girl! 28 week labs done  Has appointment with M for US for fetal growth & TRENT 7/22  Tdap today; VIS given  She did not have the Covid vaccine  Urine neg for protein and glucose

## 2021-07-08 NOTE — PROGRESS NOTES
Vini Trotter COVID-19 affecting pregnancy, antepartum  For growth scan     Supervision of other normal pregnancy    Virgie Gottlieb  is a 36 y o   @30w1d who presents for routine prenatal visit  Denies LOF, vaginal bleeding, regular uterine contractions, cramping, headaches or visual changes  Reports good fetal movement  Taking PNV daily as prescribed  TDap given today  Declined COVID vaccine  Reviewed PTL/Labor precautions and C  Normal anatomy scan  Growth scheduled for   Plans to breastfeed    Having a girl  28 week labs-all normal 1 hour 108 and normal H/H  Has yellow packet

## 2021-07-21 ENCOUNTER — ROUTINE PRENATAL (OUTPATIENT)
Dept: OBGYN CLINIC | Facility: CLINIC | Age: 31
End: 2021-07-21

## 2021-07-21 VITALS — SYSTOLIC BLOOD PRESSURE: 116 MMHG | DIASTOLIC BLOOD PRESSURE: 68 MMHG | WEIGHT: 216.8 LBS | BODY MASS INDEX: 32.02 KG/M2

## 2021-07-21 DIAGNOSIS — Z34.83 PRENATAL CARE, SUBSEQUENT PREGNANCY, THIRD TRIMESTER: Primary | ICD-10-CM

## 2021-07-21 DIAGNOSIS — O98.519 COVID-19 AFFECTING PREGNANCY, ANTEPARTUM: ICD-10-CM

## 2021-07-21 DIAGNOSIS — Z34.80 SUPERVISION OF OTHER NORMAL PREGNANCY: ICD-10-CM

## 2021-07-21 DIAGNOSIS — U07.1 COVID-19 AFFECTING PREGNANCY, ANTEPARTUM: ICD-10-CM

## 2021-07-21 LAB
SL AMB  POCT GLUCOSE, UA: NORMAL
SL AMB POCT URINE PROTEIN: NORMAL

## 2021-07-21 PROCEDURE — PNV: Performed by: STUDENT IN AN ORGANIZED HEALTH CARE EDUCATION/TRAINING PROGRAM

## 2021-07-21 NOTE — PROGRESS NOTES
Problem List Items Addressed This Visit        Other    Supervision of other normal pregnancy     36 y o  Sidney Parkinson at 32w0d presents today for routine PNV  Denies leakage of fluid, vaginal bleeding, contractions  Good fetal movement  S/p tdap and flu shot  Pregravid BMI 28, goal for pregnancy 15-25   TWG to date 24#  Reviewed daily walks after lunch with goal 30 minutes five times per week as well as avoiding sugary drinks, snacks, and refined carbohydrates  Continue to monitor closely    Reviewed precautions, routine 2 week follow up         COVID-19 affecting pregnancy, antepartum     MFM growth scan scheduled 7/22/2021           Other Visit Diagnoses     Prenatal care, subsequent pregnancy, third trimester    -  Primary    Relevant Orders    POCT urine dip (Completed)

## 2021-07-21 NOTE — ASSESSMENT & PLAN NOTE
36 y o   at 32w0d presents today for routine PNV  Denies leakage of fluid, vaginal bleeding, contractions  Good fetal movement  S/p tdap and flu shot  Pregravid BMI 28, goal for pregnancy 15-25   TWG to date 24#  Reviewed daily walks after lunch with goal 30 minutes five times per week as well as avoiding sugary drinks, snacks, and refined carbohydrates  Continue to monitor closely    Reviewed precautions, routine 2 week follow up

## 2021-07-22 ENCOUNTER — ULTRASOUND (OUTPATIENT)
Dept: PERINATAL CARE | Facility: OTHER | Age: 31
End: 2021-07-22
Payer: COMMERCIAL

## 2021-07-22 VITALS
WEIGHT: 214.95 LBS | DIASTOLIC BLOOD PRESSURE: 73 MMHG | HEART RATE: 69 BPM | SYSTOLIC BLOOD PRESSURE: 119 MMHG | BODY MASS INDEX: 31.74 KG/M2

## 2021-07-22 DIAGNOSIS — U07.1 COVID-19 AFFECTING PREGNANCY, ANTEPARTUM: ICD-10-CM

## 2021-07-22 DIAGNOSIS — O98.519 COVID-19 AFFECTING PREGNANCY, ANTEPARTUM: ICD-10-CM

## 2021-07-22 DIAGNOSIS — Z36.89 ENCOUNTER FOR ULTRASOUND TO CHECK FETAL GROWTH: Primary | ICD-10-CM

## 2021-07-22 DIAGNOSIS — Z36.2 ENCOUNTER FOR FOLLOW-UP ULTRASOUND OF FETAL ANATOMY: ICD-10-CM

## 2021-07-22 PROCEDURE — 76816 OB US FOLLOW-UP PER FETUS: CPT | Performed by: OBSTETRICS & GYNECOLOGY

## 2021-07-22 PROCEDURE — 99213 OFFICE O/P EST LOW 20 MIN: CPT | Performed by: OBSTETRICS & GYNECOLOGY

## 2021-07-22 NOTE — PATIENT INSTRUCTIONS
Thank you for choosing us for your  care today  If you have any questions about your ultrasound or care, please do not hesitate to contact us or your primary obstetrician  Some general instructions for your pregnancy are:     Protect against coronavirus: Continue to practice social distancing, wear a mask, and wash your hands often  Pregnant women are increased risk of severe COVID  Notify your primary care doctor if you have any symptoms including cough, shortness of breath or difficulty breathing, fever, chills, muscle pain, sore throat, or loss of taste or smell  Pregnant women can receive the coronavirus vaccine   Exercise: Aim for 22 minutes per day (150 minutes per week) of regular exercise  Walking is great!  Nutrition: aim for calcium-rich and iron-rich foods as well as healthy sources of protein   Protect against the flu: get yourself and your entire household vaccinated against influenza  This will protect your baby   Learn about Preeclampsia: preeclampsia is a common, serious high blood pressure complication in pregnancy  A blood pressure of 464ERUR (systolic or top number) or 23FMPY (diastolic or bottom number) is not normal and needs evaluation by your doctor   If you smoke, try to reduce how many cigarettes you smoke or try to quit completely  Do not vape   Other warning signs to watch out for in pregnancy or postpartum: chest pain, obstructed breathing or shortness of breath, seizures, thoughts of hurting yourself or your baby, bleeding, a painful or swollen leg, fever, or headache (see [employer] POST-BIRTH Warning Signs campaign)  If these happen call 911  Itching is also not normal in pregnancy and if you experience this, especially over your hands and feet, potentially worse at night, notify your doctors     Lastly, if you are contacted regarding participation in a survey about your experience in our office, please know that we take any feedback you provide seriously and use it to improve how we deliver care through our center

## 2021-07-22 NOTE — PROGRESS NOTES
[facility] Jesse Garcia was seen today at 32w1d for fetal growth and followup missed anatomy ultrasound  See ultrasound report under "OB Procedures" tab  Please don't hesitate to contact our office with any concerns or questions    Evelyn Fofana MD

## 2021-07-28 NOTE — PROGRESS NOTES
Patient's Lab: [facility]    G: 3      P: 0020  Blood Type: O+  Glucose Results: 1hr  Normal  Labs Up-to-date: Yes  Folders Given: Has both  LOF, VB: No  Contractions: No  Breast/Bottle: Breast  Pump: Has pump  FM: Good  Delivery Consent: Scanned  Gender:Girl  S/P Injections/Vaccines: Flu/Tdap  TWlbs 12 8oz  Urine Today: -/-    Q's/Concerns: just some birth plan questions, Gi discomfort

## 2021-08-02 ENCOUNTER — ROUTINE PRENATAL (OUTPATIENT)
Dept: OBGYN CLINIC | Facility: CLINIC | Age: 31
End: 2021-08-02

## 2021-08-02 VITALS
SYSTOLIC BLOOD PRESSURE: 108 MMHG | BODY MASS INDEX: 32.31 KG/M2 | WEIGHT: 218.8 LBS | HEART RATE: 85 BPM | DIASTOLIC BLOOD PRESSURE: 72 MMHG

## 2021-08-02 DIAGNOSIS — Z28.21 COVID-19 VACCINE SERIES DECLINED: ICD-10-CM

## 2021-08-02 DIAGNOSIS — Z34.83 ENCOUNTER FOR SUPERVISION OF OTHER NORMAL PREGNANCY IN THIRD TRIMESTER: Primary | ICD-10-CM

## 2021-08-02 DIAGNOSIS — U07.1 COVID-19 AFFECTING PREGNANCY, ANTEPARTUM: ICD-10-CM

## 2021-08-02 DIAGNOSIS — O98.519 COVID-19 AFFECTING PREGNANCY, ANTEPARTUM: ICD-10-CM

## 2021-08-02 DIAGNOSIS — Z28.310 COVID-19 VACCINE SERIES DECLINED: ICD-10-CM

## 2021-08-02 PROBLEM — Z34.90 SUPERVISION OF NORMAL PREGNANCY: Status: ACTIVE | Noted: 2021-08-02

## 2021-08-02 LAB
SL AMB  POCT GLUCOSE, UA: NEGATIVE
SL AMB POCT URINE PROTEIN: NEGATIVE

## 2021-08-02 PROCEDURE — PNV: Performed by: NURSE PRACTITIONER

## 2021-08-02 NOTE — PROGRESS NOTES
Here for OB visit at 33 5  Weeks accompanied by , Temple  TWG 26 lbs  Increased flatulence the last few days unrelated to diet; none today  7/22/21 MFM appt  AGA 32 1 weeks  Vertex  Normal fetal growth  Posterior, fundal placenta grade II  EFW 44%  TRENT WNL  Denies LOF, vaginal bleeding or contractions  Encouraged to perform FKC's daily 10 in 2 hours  TDAP and influenza vaccines are UTD  Fully recovered from covid; positive 4/12/21; covid vaccine refused  "Its a girl"  She will be named Edilson  Plans to breastfeed and has her pump already     RTO 2 weeks

## 2021-08-02 NOTE — PATIENT INSTRUCTIONS
Pregnancy at 31 to 34 Weeks   AMBULATORY CARE:   What changes are happening to your body: You may continue to have symptoms such as shortness of breath, heartburn, contractions, or swelling of your ankles and feet  You may be gaining about 1 pound a week now  Seek care immediately if:   · You develop a severe headache that does not go away  · You have new or increased vision changes, such as blurred or spotted vision  · You have new or increased swelling in your face or hands  · You have vaginal spotting or bleeding  · Your water broke or you feel warm water gushing or trickling from your vagina  Contact your healthcare provider if:   · You have more than 5 contractions in 1 hour  · You notice any changes in your baby's movements  · You have abdominal cramps, pressure, or tightening  · You have a change in vaginal discharge  · You have chills or a fever  · You have vaginal itching, burning, or pain  · You have yellow, green, white, or foul-smelling vaginal discharge  · You have pain or burning when you urinate, less urine than usual, or pink or bloody urine  · You have questions or concerns about your condition or care  How to care for yourself at this stage of your pregnancy:   · Eat a variety of healthy foods  Healthy foods include fruits, vegetables, whole-grain breads, low-fat dairy foods, beans, lean meats, and fish  Drink liquids as directed  Ask how much liquid to drink each day and which liquids are best for you  Limit caffeine to less than 200 milligrams each day  Limit your intake of fish to 2 servings each week  Choose fish low in mercury such as canned light tuna, shrimp, salmon, cod, or tilapia  Do not  eat fish high in mercury such as swordfish, tilefish, payal mackerel, and shark  · Manage heartburn  by eating 4 or 5 small meals each day instead of large meals  Avoid spicy food  · Manage swelling  by lying down and putting your feet up  · Take prenatal vitamins as directed  Your need for certain vitamins and minerals, such as folic acid, increases during pregnancy  Prenatal vitamins provide some of the extra vitamins and minerals you need  Prenatal vitamins may also help to decrease the risk of certain birth defects  · Talk to your healthcare provider about exercise  Moderate exercise can help you stay fit  Your healthcare provider will help you plan an exercise program that is safe for you during pregnancy  · Do not smoke  Smoking increases your risk of a miscarriage and other health problems during your pregnancy  Smoking can cause your baby to be born too early or weigh less at birth  Ask your healthcare provider for information if you need help quitting  · Do not drink alcohol  Alcohol passes from your body to your baby through the placenta  It can affect your baby's brain development and cause fetal alcohol syndrome (FAS)  FAS is a group of conditions that causes mental, behavior, and growth problems  · Talk to your healthcare provider before you take any medicines  Many medicines may harm your baby if you take them when you are pregnant  Do not take any medicines, vitamins, herbs, or supplements without first talking to your healthcare provider  Never use illegal or street drugs (such as marijuana or cocaine) while you are pregnant  Safety tips during pregnancy:   · Avoid hot tubs and saunas  Do not use a hot tub or sauna while you are pregnant, especially during your first trimester  Hot tubs and saunas may raise your baby's temperature and increase the risk of birth defects  · Avoid toxoplasmosis  This is an infection caused by eating raw meat or being around infected cat feces  It can cause birth defects, miscarriages, and other problems  Wash your hands after you touch raw meat  Make sure any meat is well-cooked before you eat it  Avoid raw eggs and unpasteurized milk   Use gloves or ask someone else to clean your cat's litter box while you are pregnant  Changes that are happening with your baby:  By 34 weeks, your baby may weigh more than 5 pounds  Your baby will be about 12 ½ inches long from the top of the head to the rump (baby's bottom)  Your baby is gaining about ½ pound a week  Your baby's eyes open and close now  Your baby's kicks and movements are more forceful at this time  What you need to know about prenatal care: Your healthcare provider will check your blood pressure and weight  You may also need the following:  · A urine test  may also be done to check for sugar and protein  These can be signs of gestational diabetes or infection  Protein in your urine may also be a sign of preeclampsia  Preeclampsia is a condition that can develop during week 20 or later of your pregnancy  It causes high blood pressure, and it can cause problems with your kidneys and other organs  · A Tdap vaccine  may be recommended by your healthcare provider  · Fundal height  is a measurement of your uterus to check your baby's growth  This number is usually the same as the number of weeks that you have been pregnant  Your healthcare provider may also check your baby's position  · Your baby's heart rate  will be checked  © Copyright [employer] 2021 Information is for End User's use only and may not be sold, redistributed or otherwise used for commercial purposes  All illustrations and images included in CareNotes® are the copyrighted property of [employer]  or Samantha Sterling   The above information is an  only  It is not intended as medical advice for individual conditions or treatments  Talk to your doctor, nurse or pharmacist before following any medical regimen to see if it is safe and effective for you

## 2021-08-18 ENCOUNTER — ROUTINE PRENATAL (OUTPATIENT)
Dept: OBGYN CLINIC | Facility: CLINIC | Age: 31
End: 2021-08-18

## 2021-08-18 VITALS — DIASTOLIC BLOOD PRESSURE: 66 MMHG | SYSTOLIC BLOOD PRESSURE: 102 MMHG | BODY MASS INDEX: 32.87 KG/M2 | WEIGHT: 222.6 LBS

## 2021-08-18 DIAGNOSIS — Z34.80 SUPERVISION OF OTHER NORMAL PREGNANCY: Primary | ICD-10-CM

## 2021-08-18 DIAGNOSIS — O98.519 COVID-19 AFFECTING PREGNANCY, ANTEPARTUM: ICD-10-CM

## 2021-08-18 DIAGNOSIS — U07.1 COVID-19 AFFECTING PREGNANCY, ANTEPARTUM: ICD-10-CM

## 2021-08-18 PROBLEM — Z34.90 SUPERVISION OF NORMAL PREGNANCY: Status: RESOLVED | Noted: 2021-08-02 | Resolved: 2021-08-18

## 2021-08-18 LAB
SL AMB  POCT GLUCOSE, UA: NORMAL
SL AMB POCT URINE PROTEIN: NEGATIVE

## 2021-08-18 PROCEDURE — PNV: Performed by: NURSE PRACTITIONER

## 2021-08-18 PROCEDURE — 87150 DNA/RNA AMPLIFIED PROBE: CPT | Performed by: NURSE PRACTITIONER

## 2021-08-18 NOTE — PATIENT INSTRUCTIONS
Pregnancy at [address] to [address]:   What changes are happening in my body? You are considered full term at the beginning of 37 weeks  Your breathing may be easier if your baby has moved down into a head-down position  You may need to urinate more often because the baby may be pressing on your bladder  You may also feel more discomfort and get tired easily  How do I care for myself at this stage of my pregnancy? · Eat a variety of healthy foods  Healthy foods include fruits, vegetables, whole-grain breads, low-fat dairy foods, beans, lean meats, and fish  Drink liquids as directed  Ask how much liquid to drink each day and which liquids are best for you  Limit caffeine to less than 200 milligrams each day  Limit your intake of fish to 2 servings each week  Choose fish low in mercury such as canned light tuna, shrimp, salmon, cod, or tilapia  Do not  eat fish high in mercury such as swordfish, tilefish, payal mackerel, and shark  · Take prenatal vitamins as directed  Your need for certain vitamins and minerals, such as folic acid, increases during pregnancy  Prenatal vitamins provide some of the extra vitamins and minerals you need  Prenatal vitamins may also help to decrease the risk of certain birth defects  · Rest as needed  Put your feet up if you have swelling in your ankles and feet  · Do not smoke  Smoking increases your risk of a miscarriage and other health problems during your pregnancy  Smoking can cause your baby to be born early or weigh less at birth  Ask your healthcare provider for information if you need help quitting  · Do not drink alcohol  Alcohol passes from your body to your baby through the placenta  It can affect your baby's brain development and cause fetal alcohol syndrome (FAS)  FAS is a group of conditions that causes mental, behavior, and growth problems  · Talk to your healthcare provider before you take any medicines    Many medicines may harm your baby if you take them when you are pregnant  Do not take any medicines, vitamins, herbs, or supplements without first talking to your healthcare provider  Never use illegal or street drugs (such as marijuana or cocaine) while you are pregnant  · Talk to your healthcare provider before you travel  You may not be able to travel in an airplane after 36 weeks  He may also recommend that you avoid long road trips  What are some safety tips during pregnancy? · Avoid hot tubs and saunas  Do not use a hot tub or sauna while you are pregnant, especially during your first trimester  Hot tubs and saunas may raise your baby's temperature and increase the risk of birth defects  · Avoid toxoplasmosis  This is an infection caused by eating raw meat or being around infected cat feces  It can cause birth defects, miscarriages, and other problems  Wash your hands after you touch raw meat  Make sure any meat is well-cooked before you eat it  Avoid raw eggs and unpasteurized milk  Use gloves or ask someone else to clean your cat's litter box while you are pregnant  · Ask your healthcare provider about travel  The most comfortable time to travel is during the second trimester  Ask your healthcare provider if you can travel after 36 weeks  You may not be able to travel in an airplane after 36 weeks  He may also recommend that you avoid long road trips  What changes are happening with my baby? By 38 weeks, your baby may weigh between 6 and 9 pounds  Your baby may be about 14 inches long from the top of the head to the rump (baby's bottom)  Your baby hears well enough to know your voice  As your baby gets larger, you may feel fewer kicks and more stretching and rolling  Your baby may move into a head-down position  Your baby will also rest lower in your abdomen  What do I need to know about prenatal care? Your healthcare provider will check your blood pressure and weight   You may also need the following:  · A urine test  may also be done to check for sugar and protein  These can be signs of gestational diabetes or infection  Protein in your urine may also be a sign of preeclampsia  Preeclampsia is a condition that can develop during week 20 or later of your pregnancy  It causes high blood pressure, and it can cause problems with your kidneys and other organs  · A blood test  may be done to check for anemia (low iron level)  · A Tdap vaccine  may be recommended by your healthcare provider  · A group B strep test  is a test that is done to check for group B strep infection  Group B strep is a type of bacteria that may be found in the vagina or rectum  It can be passed to your baby during delivery if you have it  Your healthcare provider will take swab your vagina or rectum and send the sample to the lab for tests  · Fundal height  is a measurement of your uterus to check your baby's growth  This number is usually the same as the number of weeks that you have been pregnant  Your healthcare provider may also check your baby's position  · Your baby's heart rate  will be checked  When should I seek immediate care? · You develop a severe headache that does not go away  · You have new or increased vision changes, such as blurred or spotted vision  · You have new or increased swelling in your face or hands  · You have vaginal spotting or bleeding  · Your water broke or you feel warm water gushing or trickling from your vagina  When should I contact my healthcare provider? · You have more than 5 contractions in 1 hour  · You notice any changes in your baby's movements  · You have abdominal cramps, pressure, or tightening  · You have a change in vaginal discharge  · You have chills or a fever  · You have vaginal itching, burning, or pain  · You have yellow, green, white, or foul-smelling vaginal discharge      · You have pain or burning when you urinate, less urine than usual, or pink or bloody urine  · You have questions or concerns about your condition or care  CARE AGREEMENT:   You have the right to help plan your care  Learn about your health condition and how it may be treated  Discuss treatment options with your healthcare providers to decide what care you want to receive  You always have the right to refuse treatment  The above information is an  only  It is not intended as medical advice for individual conditions or treatments  Talk to your doctor, nurse or pharmacist before following any medical regimen to see if it is safe and effective for you  © Copyright [employer] 2021 Information is for End User's use only and may not be sold, redistributed or otherwise used for commercial purposes   All illustrations and images included in CareNotes® are the copyrighted property of A D A M , Inc  or [address] [place] [address]

## 2021-08-18 NOTE — PROGRESS NOTES
Sakina Ramirez COVID-19 affecting pregnancy, antepartum   growth scan AC 39%, HC 12%, growth 44% and TRENT=12 5  No further scheduled  Supervision of other normal pregnancy    Olvin Ivory  is a 36 y o   @36w0d who presents for routine prenatal visit  Denies LOF, vaginal bleeding, regular uterine contractions, cramping, headaches or visual changes  Reports good fetal movement  Taking PNV daily as prescribed  TDap up to date  Reviewed PTL/Labor precautions and FKC  Did not have COVID vaccine  Declines in pregnancy  [place] [facility] form completed and copy for chart  GBS collected today  Declined cervical exam  Plans to breastfeed    Having a girl "Edilson "  Has yellow packet

## 2021-08-18 NOTE — PROGRESS NOTES
Patient here for PN visit  She denies spotting, LOF or cramping  Good fetal movement  It's a girl! GBS collected today  She would like to be examined

## 2021-08-18 NOTE — ASSESSMENT & PLAN NOTE
Jessica Ford  is a 36 y o   @36w0d who presents for routine prenatal visit  Denies LOF, vaginal bleeding, regular uterine contractions, cramping, headaches or visual changes  Reports good fetal movement  Taking PNV daily as prescribed  TDap up to date  Reviewed PTL/Labor precautions and FKC  Did not have COVID vaccine  Declines in pregnancy  [facility] [facility] form completed and copy for chart  GBS collected today  Declined cervical exam  Plans to breastfeed    Having a girl "Edilson "  Has yellow packet

## 2021-08-21 LAB — GP B STREP DNA SPEC QL NAA+PROBE: NEGATIVE

## 2021-08-25 ENCOUNTER — ROUTINE PRENATAL (OUTPATIENT)
Dept: OBGYN CLINIC | Facility: CLINIC | Age: 31
End: 2021-08-25
Payer: COMMERCIAL

## 2021-08-25 VITALS — SYSTOLIC BLOOD PRESSURE: 104 MMHG | BODY MASS INDEX: 32.28 KG/M2 | DIASTOLIC BLOOD PRESSURE: 70 MMHG | WEIGHT: 218.6 LBS

## 2021-08-25 DIAGNOSIS — Z34.83 ENCOUNTER FOR SUPERVISION OF OTHER NORMAL PREGNANCY IN THIRD TRIMESTER: Primary | ICD-10-CM

## 2021-08-25 DIAGNOSIS — Z34.80 SUPERVISION OF OTHER NORMAL PREGNANCY: ICD-10-CM

## 2021-08-25 LAB
SL AMB  POCT GLUCOSE, UA: NEGATIVE
SL AMB POCT URINE PROTEIN: NEGATIVE

## 2021-08-25 PROCEDURE — PNV: Performed by: STUDENT IN AN ORGANIZED HEALTH CARE EDUCATION/TRAINING PROGRAM

## 2021-08-25 PROCEDURE — 59025 FETAL NON-STRESS TEST: CPT | Performed by: STUDENT IN AN ORGANIZED HEALTH CARE EDUCATION/TRAINING PROGRAM

## 2021-08-25 NOTE — PROGRESS NOTES
Pt is here for routine ob visit   No concerns at this time  Urine NEG/NEG  No LOF,VB,Contractions   +FM   Delivery consent signed   UTD Flu/Tdap   GBS normal   RH +

## 2021-08-25 NOTE — PROGRESS NOTES
36 y o   at 37w0d, here for return OB visit  Feeling well overall and without concerns  Good FM  Denies LOF, VB, contractions  Denies dysuria, hematuria       Problem List Items Addressed This Visit        Other    Supervision of other normal pregnancy     FHR 110s on bedside auscultation today, NST reactive           Other Visit Diagnoses     Encounter for supervision of other normal pregnancy in third trimester    -  Primary    Relevant Orders    POCT urine dip (Completed)         baseline with accelerations, no decelerations, moderate variability

## 2021-09-03 ENCOUNTER — TELEPHONE (OUTPATIENT)
Dept: OBGYN CLINIC | Facility: CLINIC | Age: 31
End: 2021-09-03

## 2021-09-03 ENCOUNTER — ROUTINE PRENATAL (OUTPATIENT)
Dept: OBGYN CLINIC | Facility: CLINIC | Age: 31
End: 2021-09-03

## 2021-09-03 VITALS — SYSTOLIC BLOOD PRESSURE: 112 MMHG | BODY MASS INDEX: 32.7 KG/M2 | WEIGHT: 221.4 LBS | DIASTOLIC BLOOD PRESSURE: 62 MMHG

## 2021-09-03 DIAGNOSIS — Z34.83 PRENATAL CARE, SUBSEQUENT PREGNANCY, THIRD TRIMESTER: Primary | ICD-10-CM

## 2021-09-03 DIAGNOSIS — U07.1 COVID-19 AFFECTING PREGNANCY, ANTEPARTUM: ICD-10-CM

## 2021-09-03 DIAGNOSIS — O98.519 COVID-19 AFFECTING PREGNANCY, ANTEPARTUM: ICD-10-CM

## 2021-09-03 DIAGNOSIS — Z34.80 SUPERVISION OF OTHER NORMAL PREGNANCY: ICD-10-CM

## 2021-09-03 LAB
SL AMB  POCT GLUCOSE, UA: NORMAL
SL AMB POCT URINE PROTEIN: NORMAL

## 2021-09-03 PROCEDURE — PNV: Performed by: STUDENT IN AN ORGANIZED HEALTH CARE EDUCATION/TRAINING PROGRAM

## 2021-09-03 NOTE — ASSESSMENT & PLAN NOTE
36 y o   at 38w2d presents today for routine PNV  Denies leakage of fluid, vaginal bleeding, contractions  Good fetal movement    -GBS neg  -s/p tdap  -Declined SVE today    Reviewed precautions, advised against travel at this time close to due date, if does to find closest hospital with [facility] care  Not interested induction at this time, for SVE next visit

## 2021-09-03 NOTE — PROGRESS NOTES
Problem List Items Addressed This Visit        Other    Supervision of other normal pregnancy     36 y o   at 38w2d presents today for routine PNV  Denies leakage of fluid, vaginal bleeding, contractions  Good fetal movement    -GBS neg  -s/p tdap  -Declined SVE today    Reviewed precautions, advised against travel at this time close to due date, if does to find closest hospital [place] care  Not interested induction at this time, for SVE next visit         COVID-19 affecting pregnancy, antepartum     2021 EFW [place] 4   1910 grams - 4 lbs 3 oz                 (44%)           Other Visit Diagnoses     Prenatal care, subsequent pregnancy, third trimester    -  Primary    Relevant Orders    POCT urine dip

## 2021-09-08 ENCOUNTER — ROUTINE PRENATAL (OUTPATIENT)
Dept: OBGYN CLINIC | Facility: CLINIC | Age: 31
End: 2021-09-08

## 2021-09-08 VITALS — SYSTOLIC BLOOD PRESSURE: 122 MMHG | DIASTOLIC BLOOD PRESSURE: 70 MMHG | WEIGHT: 220.6 LBS | BODY MASS INDEX: 32.58 KG/M2

## 2021-09-08 DIAGNOSIS — Z34.80 SUPERVISION OF OTHER NORMAL PREGNANCY: Primary | ICD-10-CM

## 2021-09-08 DIAGNOSIS — O98.519 COVID-19 AFFECTING PREGNANCY, ANTEPARTUM: ICD-10-CM

## 2021-09-08 DIAGNOSIS — U07.1 COVID-19 AFFECTING PREGNANCY, ANTEPARTUM: ICD-10-CM

## 2021-09-08 LAB
SL AMB  POCT GLUCOSE, UA: NORMAL
SL AMB POCT URINE PROTEIN: NORMAL

## 2021-09-08 PROCEDURE — PNV: Performed by: NURSE PRACTITIONER

## 2021-09-08 NOTE — PROGRESS NOTES
Patient here for PN visit  She states she has cramping; denies spotting or LOF  Good fetal movement  She would like her cervix checked today  GBS -neg  Urine neg for protein and glucose

## 2021-09-08 NOTE — ASSESSMENT & PLAN NOTE
Rosa Cobb  is a 36 y o   @39w0d who presents for routine prenatal visit  Denies LOF, vaginal bleeding, regular uterine contractions, headaches or visual changes  Having some cramping  Reports good fetal movement  Taking PNV daily as prescribed  TDap up to date  Declines COVID vaccine until PP  Reviewed labor precautions and FKC  GBS negative  Plans to breastfeed  Having a girl "Edilson "  Cx closed/30/-3/posterior/soft    NOT interested in IOL  Has yellow packet

## 2021-09-08 NOTE — PROGRESS NOTES
Premaolome Pinks COVID-19 affecting pregnancy, antepartum   growth scan AC 39%, HC 12%, growth 44% and TRENT=12 5  No further scheduled    Supervision of other normal pregnancy    Debbi Staton  is a 36 y o   @39w0d who presents for routine prenatal visit  Denies LOF, vaginal bleeding, regular uterine contractions, headaches or visual changes  Having some cramping  Reports good fetal movement  Taking PNV daily as prescribed  TDap up to date  Declines COVID vaccine until PP  Reviewed labor precautions and FKC  GBS negative  Plans to breastfeed  Having a girl "Edilson "  Cx closed//3/posterior/soft    NOT interested in IOL  Has yellow packet

## 2021-09-08 NOTE — PATIENT INSTRUCTIONS
Pregnancy at 44 to 40 Weeks   [address] [place], [place] [address] :   What changes are happening in my body? You are now getting close to your due date  Your due date is just an estimate of when your baby will be born  Your baby may be born before or after your due date  Your breathing may be easier if your baby has moved down into a head-down position  You may need to urinate more often because the baby may be pressing on your bladder  You may also feel more discomfort and tire easily  You may also be having trouble sleeping  How do I care for myself at this stage of my pregnancy? · Eat a variety of healthy foods  Healthy foods include fruits, vegetables, whole-grain breads, low-fat dairy foods, beans, lean meats, and fish  Drink liquids as directed  Ask how much liquid to drink each day and which liquids are best for you  Limit caffeine to less than 200 milligrams each day  Limit your intake of fish to 2 servings each week  Choose fish low in mercury such as canned light tuna, shrimp, salmon, cod, or tilapia  Do not  eat fish high in mercury such as swordfish, tilefish, payal mackerel, and shark  · Take prenatal vitamins as directed  Your need for certain vitamins and minerals, such as folic acid, increases during pregnancy  Prenatal vitamins provide some of the extra vitamins and minerals you need  Prenatal vitamins may also help to decrease the risk of certain birth defects  · Rest as needed  Put your feet up if you have swelling in your ankles and feet  · Do not smoke  Smoking increases your risk of a miscarriage and other health problems during your pregnancy  Smoking can cause your baby to be born early or weigh less at birth  Ask your healthcare provider for information if you need help quitting  · Do not drink alcohol  Alcohol passes from your body to your baby through the placenta  It can affect your baby's brain development and cause fetal alcohol syndrome (FAS)   FAS is a group of conditions that causes mental, behavior, and growth problems  · Talk to your healthcare provider before you take any medicines  Many medicines may harm your baby if you take them when you are pregnant  Do not take any medicines, vitamins, herbs, or supplements without first talking to your healthcare provider  Never use illegal or street drugs (such as marijuana or cocaine) while you are pregnant  · Talk to your healthcare provider before you travel  You may not be able to travel in an airplane after 36 weeks  He may also recommend that you avoid long road trips  What are some safety tips during pregnancy? · Avoid hot tubs and saunas  Do not use a hot tub or sauna while you are pregnant, especially during your first trimester  Hot tubs and saunas may raise your baby's temperature and increase the risk of birth defects  · Avoid toxoplasmosis  This is an infection caused by eating raw meat or being around infected cat feces  It can cause birth defects, miscarriages, and other problems  Wash your hands after you touch raw meat  Make sure any meat is well-cooked before you eat it  Avoid raw eggs and unpasteurized milk  Use gloves or ask someone else to clean your cat's litter box while you are pregnant  · Ask your healthcare provider about travel  The most comfortable time to travel is during the second trimester  Ask your healthcare provider if you can travel after 36 weeks  You may not be able to travel in an airplane after 36 weeks  He may also recommend that you avoid long road trips  What changes are happening with my baby? Your baby is ready to be born  At birth, your baby may weigh about 6 to 9 pounds and be about 19 to 21 inches long  Your baby may be in a head-down position  Your baby will also rest lower in your abdomen  What do I need to know about prenatal care? Your healthcare provider will check your blood pressure and weight   You may also need the following:  · A urine test may also be done to check for sugar and protein  These can be signs of gestational diabetes or infection  Protein in your urine may also be a sign of preeclampsia  Preeclampsia is a condition that can develop during week 20 or later of your pregnancy  It causes high blood pressure, and it can cause problems with your kidneys and other organs  · Your baby's heart rate  will be checked  When should I seek immediate care? · You develop a severe headache that does not go away  · You have new or increased vision changes, such as blurred or spotted vision  · You have new or increased swelling in your face or hands  · You have vaginal spotting or bleeding  · Your water broke or you feel warm water gushing or trickling from your vagina  When should I contact my healthcare provider? · You have more than 5 contractions in 1 hour  · You notice any changes in your baby's movements  · You have abdominal cramps, pressure, or tightening  · You have a change in vaginal discharge  · You have chills or a fever  · You have vaginal itching, burning, or pain  · You have yellow, green, white, or foul-smelling vaginal discharge  · You have pain or burning when you urinate, less urine than usual, or pink or bloody urine  · You have questions or concerns about your condition or care  CARE AGREEMENT:   You have the right to help plan your care  Learn about your health condition and how it may be treated  Discuss treatment options with your healthcare providers to decide what care you want to receive  You always have the right to refuse treatment  The above information is an  only  It is not intended as medical advice for individual conditions or treatments  Talk to your doctor, nurse or pharmacist before following any medical regimen to see if it is safe and effective for you    © Copyright [address] 2021 Information is for End User's use only and may not be sold, redistributed or otherwise used for commercial purposes   All illustrations and images included in CareNotes® are the copyrighted property of A D A M , Inc  or [facility] [place] [facility]

## 2021-09-15 ENCOUNTER — TELEPHONE (OUTPATIENT)
Dept: OBGYN CLINIC | Facility: CLINIC | Age: 31
End: 2021-09-15

## 2021-09-15 ENCOUNTER — ROUTINE PRENATAL (OUTPATIENT)
Dept: OBGYN CLINIC | Facility: CLINIC | Age: 31
End: 2021-09-15

## 2021-09-15 VITALS — SYSTOLIC BLOOD PRESSURE: 114 MMHG | DIASTOLIC BLOOD PRESSURE: 66 MMHG | BODY MASS INDEX: 32.9 KG/M2 | WEIGHT: 222.8 LBS

## 2021-09-15 DIAGNOSIS — O98.519 COVID-19 AFFECTING PREGNANCY, ANTEPARTUM: ICD-10-CM

## 2021-09-15 DIAGNOSIS — Z34.80 SUPERVISION OF OTHER NORMAL PREGNANCY: Primary | ICD-10-CM

## 2021-09-15 DIAGNOSIS — U07.1 COVID-19 AFFECTING PREGNANCY, ANTEPARTUM: ICD-10-CM

## 2021-09-15 LAB
SL AMB  POCT GLUCOSE, UA: NORMAL
SL AMB POCT URINE PROTEIN: NORMAL

## 2021-09-15 PROCEDURE — PNV: Performed by: NURSE PRACTITIONER

## 2021-09-15 NOTE — TELEPHONE ENCOUNTER
Pts edc 09/15/21, spoke with Maurisio Patel in l and d pt scheduled for 09/21 8 pm elvis with provider matias  Pt contacted # [phone]- contacted and informed induction date time, masking policy and location  Pt agreed to plan of action

## 2021-09-15 NOTE — ASSESSMENT & PLAN NOTE
Debbi Staton  is a 36 y o   @40w0d who presents for routine prenatal visit  Denies LOF, vaginal bleeding, regular uterine contractions, headaches or visual changes  Having some cramping  Reports good fetal movement  Taking PNV daily as prescribed  TDap up to date  Declines COVID vaccine until PP  Reviewed labor precautions and FKC  GBS negative  Plans to breastfeed  Having a girl "Edilson "  Cx closed/30/-3/posterior/soft  Interested in [facility] @ 41 weeks  Consent signed and email sent to triage to schedule  Written information provided  Needs NST/TRENT @ 41 weeks if IOL scheduled late     Has yellow packet

## 2021-09-15 NOTE — PROGRESS NOTES
Deepali Schilling COVID-19 affecting pregnancy, antepartum  7/22 growth scan AC 39%, HC 12%, growth 44% and TRENT=12 5   No further scheduled    Supervision of other normal pregnancy  Mariaelena Mccartney  is a 36 y o  Durel Lei @40w0d who presents for routine prenatal visit  Denies LOF, vaginal bleeding, regular uterine contractions, headaches or visual changes  Having some cramping  Reports good fetal movement  Taking PNV daily as prescribed  TDap up to date  Declines COVID vaccine until PP  Reviewed labor precautions and FKC  GBS negative  Plans to breastfeed  Having a girl "[place] "  Cx closed/30/-3/posterior/soft  Interested in [address] [place] [address] @ 41 weeks  Consent signed and email sent to triage to schedule  Written information provided  Needs NST/TRENT @ 41 weeks if IOL scheduled late     Has yellow packet

## 2021-09-15 NOTE — PATIENT INSTRUCTIONS
Pregnancy at 44 to 40 Weeks   [address] [place], [place] [address] :   What changes are happening in my body? You are now getting close to your due date  Your due date is just an estimate of when your baby will be born  Your baby may be born before or after your due date  Your breathing may be easier if your baby has moved down into a head-down position  You may need to urinate more often because the baby may be pressing on your bladder  You may also feel more discomfort and tire easily  You may also be having trouble sleeping  How do I care for myself at this stage of my pregnancy? · Eat a variety of healthy foods  Healthy foods include fruits, vegetables, whole-grain breads, low-fat dairy foods, beans, lean meats, and fish  Drink liquids as directed  Ask how much liquid to drink each day and which liquids are best for you  Limit caffeine to less than 200 milligrams each day  Limit your intake of fish to 2 servings each week  Choose fish low in mercury such as canned light tuna, shrimp, salmon, cod, or tilapia  Do not  eat fish high in mercury such as swordfish, tilefish, payal mackerel, and shark  · Take prenatal vitamins as directed  Your need for certain vitamins and minerals, such as folic acid, increases during pregnancy  Prenatal vitamins provide some of the extra vitamins and minerals you need  Prenatal vitamins may also help to decrease the risk of certain birth defects  · Rest as needed  Put your feet up if you have swelling in your ankles and feet  · Do not smoke  Smoking increases your risk of a miscarriage and other health problems during your pregnancy  Smoking can cause your baby to be born early or weigh less at birth  Ask your healthcare provider for information if you need help quitting  · Do not drink alcohol  Alcohol passes from your body to your baby through the placenta  It can affect your baby's brain development and cause fetal alcohol syndrome (FAS)   FAS is a group of conditions that causes mental, behavior, and growth problems  · Talk to your healthcare provider before you take any medicines  Many medicines may harm your baby if you take them when you are pregnant  Do not take any medicines, vitamins, herbs, or supplements without first talking to your healthcare provider  Never use illegal or street drugs (such as marijuana or cocaine) while you are pregnant  · Talk to your healthcare provider before you travel  You may not be able to travel in an airplane after 36 weeks  He may also recommend that you avoid long road trips  What are some safety tips during pregnancy? · Avoid hot tubs and saunas  Do not use a hot tub or sauna while you are pregnant, especially during your first trimester  Hot tubs and saunas may raise your baby's temperature and increase the risk of birth defects  · Avoid toxoplasmosis  This is an infection caused by eating raw meat or being around infected cat feces  It can cause birth defects, miscarriages, and other problems  Wash your hands after you touch raw meat  Make sure any meat is well-cooked before you eat it  Avoid raw eggs and unpasteurized milk  Use gloves or ask someone else to clean your cat's litter box while you are pregnant  · Ask your healthcare provider about travel  The most comfortable time to travel is during the second trimester  Ask your healthcare provider if you can travel after 36 weeks  You may not be able to travel in an airplane after 36 weeks  He may also recommend that you avoid long road trips  What changes are happening with my baby? Your baby is ready to be born  At birth, your baby may weigh about 6 to 9 pounds and be about 19 to 21 inches long  Your baby may be in a head-down position  Your baby will also rest lower in your abdomen  What do I need to know about prenatal care? Your healthcare provider will check your blood pressure and weight   You may also need the following:  · A urine test may also be done to check for sugar and protein  These can be signs of gestational diabetes or infection  Protein in your urine may also be a sign of preeclampsia  Preeclampsia is a condition that can develop during week 20 or later of your pregnancy  It causes high blood pressure, and it can cause problems with your kidneys and other organs  · Your baby's heart rate  will be checked  When should I seek immediate care? · You develop a severe headache that does not go away  · You have new or increased vision changes, such as blurred or spotted vision  · You have new or increased swelling in your face or hands  · You have vaginal spotting or bleeding  · Your water broke or you feel warm water gushing or trickling from your vagina  When should I contact my healthcare provider? · You have more than 5 contractions in 1 hour  · You notice any changes in your baby's movements  · You have abdominal cramps, pressure, or tightening  · You have a change in vaginal discharge  · You have chills or a fever  · You have vaginal itching, burning, or pain  · You have yellow, green, white, or foul-smelling vaginal discharge  · You have pain or burning when you urinate, less urine than usual, or pink or bloody urine  · You have questions or concerns about your condition or care  CARE AGREEMENT:   You have the right to help plan your care  Learn about your health condition and how it may be treated  Discuss treatment options with your healthcare providers to decide what care you want to receive  You always have the right to refuse treatment  The above information is an  only  It is not intended as medical advice for individual conditions or treatments  Talk to your doctor, nurse or pharmacist before following any medical regimen to see if it is safe and effective for you    © Copyright [employer] 2021 Information is for End User's use only and may not be sold, redistributed or otherwise used for commercial purposes   All illustrations and images included in CareNotes® are the copyrighted property of A D A M , Inc  or [facility] Band[facility] Rowan

## 2021-09-15 NOTE — TELEPHONE ENCOUNTER
40 weeks today  Cx closed/30/-3  She needs cx ripening  Wants 9/21 evening with IOL 9/22 or first available  Please call her once scheduled

## 2021-09-21 ENCOUNTER — HOSPITAL ENCOUNTER (INPATIENT)
Facility: HOSPITAL | Age: 31
LOS: 4 days | Discharge: HOME | End: 2021-09-25
Attending: STUDENT IN AN ORGANIZED HEALTH CARE EDUCATION/TRAINING PROGRAM | Admitting: OBSTETRICS & GYNECOLOGY
Payer: COMMERCIAL

## 2021-09-21 ENCOUNTER — HOSPITAL ENCOUNTER (OUTPATIENT)
Dept: LABOR AND DELIVERY | Facility: HOSPITAL | Age: 31
Discharge: HOME | End: 2021-09-21
Payer: COMMERCIAL

## 2021-09-21 DIAGNOSIS — Z3A.40 40 WEEKS GESTATION OF PREGNANCY: Primary | ICD-10-CM

## 2021-09-21 LAB
BASOPHILS # BLD AUTO: 0.05 THOUSANDS/ΜL (ref 0–0.1)
BASOPHILS NFR BLD AUTO: 0 % (ref 0–1)
EOSINOPHIL # BLD AUTO: 0.11 THOUSAND/ΜL (ref 0–0.61)
EOSINOPHIL NFR BLD AUTO: 1 % (ref 0–6)
ERYTHROCYTE [DISTWIDTH] IN BLOOD BY AUTOMATED COUNT: 13.6 % (ref 11.6–15.1)
HCT VFR BLD AUTO: 36.4 % (ref 34.8–46.1)
HGB BLD-MCNC: 12.5 G/DL (ref 11.5–15.4)
IMM GRANULOCYTES # BLD AUTO: 0.1 THOUSAND/UL (ref 0–0.2)
IMM GRANULOCYTES NFR BLD AUTO: 1 % (ref 0–2)
LYMPHOCYTES # BLD AUTO: 2.73 THOUSANDS/ΜL (ref 0.6–4.47)
LYMPHOCYTES NFR BLD AUTO: 24 % (ref 14–44)
MCH RBC QN AUTO: 30 PG (ref 26.8–34.3)
MCHC RBC AUTO-ENTMCNC: 34.3 G/DL (ref 31.4–37.4)
MCV RBC AUTO: 88 FL (ref 82–98)
MONOCYTES # BLD AUTO: 0.79 THOUSAND/ΜL (ref 0.17–1.22)
MONOCYTES NFR BLD AUTO: 7 % (ref 4–12)
NEUTROPHILS # BLD AUTO: 7.83 THOUSANDS/ΜL (ref 1.85–7.62)
NEUTS SEG NFR BLD AUTO: 67 % (ref 43–75)
NRBC BLD AUTO-RTO: 0 /100 WBCS
PLATELET # BLD AUTO: 242 THOUSANDS/UL (ref 149–390)
PMV BLD AUTO: 12.1 FL (ref 8.9–12.7)
RBC # BLD AUTO: 4.16 MILLION/UL (ref 3.81–5.12)
WBC # BLD AUTO: 11.61 THOUSAND/UL (ref 4.31–10.16)

## 2021-09-21 PROCEDURE — 86850 RBC ANTIBODY SCREEN: CPT

## 2021-09-21 PROCEDURE — NC001 PR NO CHARGE: Performed by: STUDENT IN AN ORGANIZED HEALTH CARE EDUCATION/TRAINING PROGRAM

## 2021-09-21 PROCEDURE — 85025 COMPLETE CBC W/AUTO DIFF WBC: CPT

## 2021-09-21 PROCEDURE — 86900 BLOOD TYPING SEROLOGIC ABO: CPT

## 2021-09-21 PROCEDURE — 86901 BLOOD TYPING SEROLOGIC RH(D): CPT

## 2021-09-21 PROCEDURE — 86592 SYPHILIS TEST NON-TREP QUAL: CPT

## 2021-09-21 PROCEDURE — 4A1HXCZ MONITORING OF PRODUCTS OF CONCEPTION, CARDIAC RATE, EXTERNAL APPROACH: ICD-10-PCS

## 2021-09-21 RX ORDER — SODIUM CHLORIDE, SODIUM LACTATE, POTASSIUM CHLORIDE, CALCIUM CHLORIDE 600; 310; 30; 20 MG/100ML; MG/100ML; MG/100ML; MG/100ML
125 INJECTION, SOLUTION INTRAVENOUS CONTINUOUS
Status: DISCONTINUED | OUTPATIENT
Start: 2021-09-21 | End: 2021-09-23

## 2021-09-21 RX ORDER — ONDANSETRON 2 MG/ML
4 INJECTION INTRAMUSCULAR; INTRAVENOUS EVERY 6 HOURS PRN
Status: DISCONTINUED | OUTPATIENT
Start: 2021-09-21 | End: 2021-09-23

## 2021-09-21 RX ADMIN — SODIUM CHLORIDE, SODIUM LACTATE, POTASSIUM CHLORIDE, AND CALCIUM CHLORIDE 125 ML/HR: .6; .31; .03; .02 INJECTION, SOLUTION INTRAVENOUS at 22:45

## 2021-09-22 ENCOUNTER — ANESTHESIA EVENT (INPATIENT)
Dept: ANESTHESIOLOGY | Facility: HOSPITAL | Age: 31
End: 2021-09-22
Payer: COMMERCIAL

## 2021-09-22 ENCOUNTER — ANESTHESIA (INPATIENT)
Dept: ANESTHESIOLOGY | Facility: HOSPITAL | Age: 31
End: 2021-09-22
Payer: COMMERCIAL

## 2021-09-22 LAB
ABO GROUP BLD: NORMAL
BLD GP AB SCN SERPL QL: NEGATIVE
RH BLD: POSITIVE
RPR SER QL: NORMAL
SPECIMEN EXPIRATION DATE: NORMAL

## 2021-09-22 PROCEDURE — 3E0P7VZ INTRODUCTION OF HORMONE INTO FEMALE REPRODUCTIVE, VIA NATURAL OR ARTIFICIAL OPENING: ICD-10-PCS | Performed by: OBSTETRICS & GYNECOLOGY

## 2021-09-22 RX ORDER — ROPIVACAINE HYDROCHLORIDE 2 MG/ML
INJECTION, SOLUTION EPIDURAL; INFILTRATION; PERINEURAL AS NEEDED
Status: DISCONTINUED | OUTPATIENT
Start: 2021-09-22 | End: 2021-09-25 | Stop reason: HOSPADM

## 2021-09-22 RX ORDER — LIDOCAINE HYDROCHLORIDE AND EPINEPHRINE 15; 5 MG/ML; UG/ML
INJECTION, SOLUTION EPIDURAL
Status: COMPLETED | OUTPATIENT
Start: 2021-09-22 | End: 2021-09-22

## 2021-09-22 RX ORDER — OXYTOCIN/RINGER'S LACTATE 30/500 ML
1-30 PLASTIC BAG, INJECTION (ML) INTRAVENOUS
Status: DISCONTINUED | OUTPATIENT
Start: 2021-09-22 | End: 2021-09-23

## 2021-09-22 RX ADMIN — LIDOCAINE HYDROCHLORIDE AND EPINEPHRINE 3 ML: 15; 5 INJECTION, SOLUTION EPIDURAL at 20:58

## 2021-09-22 RX ADMIN — SODIUM CHLORIDE, SODIUM LACTATE, POTASSIUM CHLORIDE, AND CALCIUM CHLORIDE 125 ML/HR: .6; .31; .03; .02 INJECTION, SOLUTION INTRAVENOUS at 06:44

## 2021-09-22 RX ADMIN — SODIUM CHLORIDE, SODIUM LACTATE, POTASSIUM CHLORIDE, AND CALCIUM CHLORIDE 125 ML/HR: .6; .31; .03; .02 INJECTION, SOLUTION INTRAVENOUS at 23:57

## 2021-09-22 RX ADMIN — Medication 2 MILLI-UNITS/MIN: at 14:08

## 2021-09-22 RX ADMIN — MISOPROSTOL 25 MCG: 100 TABLET ORAL at 04:34

## 2021-09-22 RX ADMIN — MISOPROSTOL 25 MCG: 100 TABLET ORAL at 09:34

## 2021-09-22 RX ADMIN — ROPIVACAINE HYDROCHLORIDE 6 MG: 2 INJECTION, SOLUTION EPIDURAL; INFILTRATION at 21:02

## 2021-09-22 RX ADMIN — SODIUM CHLORIDE, SODIUM LACTATE, POTASSIUM CHLORIDE, AND CALCIUM CHLORIDE 125 ML/HR: .6; .31; .03; .02 INJECTION, SOLUTION INTRAVENOUS at 14:08

## 2021-09-22 RX ADMIN — SODIUM CHLORIDE, SODIUM LACTATE, POTASSIUM CHLORIDE, AND CALCIUM CHLORIDE 125 ML/HR: .6; .31; .03; .02 INJECTION, SOLUTION INTRAVENOUS at 20:45

## 2021-09-22 RX ADMIN — LIDOCAINE HYDROCHLORIDE AND EPINEPHRINE 2 ML: 15; 5 INJECTION, SOLUTION EPIDURAL at 21:01

## 2021-09-22 RX ADMIN — ROPIVACAINE HYDROCHLORIDE: 2 INJECTION, SOLUTION EPIDURAL; INFILTRATION at 21:16

## 2021-09-22 NOTE — PLAN OF CARE
Problem: PAIN - ADULT  Goal: Verbalizes/displays adequate comfort level or baseline comfort level  Description: Interventions:  - Encourage patient to monitor pain and request assistance  - Assess pain using appropriate pain scale  - Administer analgesics based on type and severity of pain and evaluate response  - Implement non-pharmacological measures as appropriate and evaluate response  - Consider cultural and social influences on pain and pain management  - Notify physician/advanced practitioner if interventions unsuccessful or patient reports new pain  Outcome: Progressing     Problem: INFECTION - ADULT  Goal: Absence or prevention of progression during hospitalization  Description: INTERVENTIONS:  - Assess and monitor for signs and symptoms of infection  - Monitor lab/diagnostic results  - Monitor all insertion sites, i e  indwelling lines, tubes, and drains  - Monitor endotracheal if appropriate and nasal secretions for changes in amount and color  - [place] appropriate cooling/warming therapies per order  - Administer medications as ordered  - Instruct and encourage patient and family to use good hand hygiene technique  - Identify and instruct in appropriate isolation precautions for identified infection/condition  Outcome: Progressing  Goal: Absence of fever/infection during neutropenic period  Description: INTERVENTIONS:  - Monitor WBC    Outcome: Progressing     Problem: SAFETY ADULT  Goal: Patient will remain free of falls  Description: INTERVENTIONS:  - Educate patient/family on patient safety including physical limitations  - Instruct patient to call for assistance with activity   - Consult OT/PT to assist with strengthening/mobility   - Keep Call bell within reach  - Keep bed low and locked with side rails adjusted as appropriate  - Keep care items and personal belongings within reach  - Initiate and maintain comfort rounds  - Make Fall Risk Sign visible to staff  - Offer Toileting  in advance of need  - Initiate/Maintain alarm  - Obtain necessary fall risk management equipment:   - Apply yellow socks and bracelet for high fall risk patients  - Consider moving patient to room near nurses station  Outcome: Progressing  Goal: Maintain or return to baseline ADL function  Description: INTERVENTIONS:  -  Assess patient's ability to carry out ADLs; assess patient's baseline for ADL function and identify physical deficits which impact ability to perform ADLs (bathing, care of mouth/teeth, toileting, grooming, dressing, etc )  - Assess/evaluate cause of self-care deficits   - Assess range of motion  - Assess patient's mobility; develop plan if impaired  - Assess patient's need for assistive devices and provide as appropriate  - Encourage maximum independence but intervene and supervise when necessary  - Involve family in performance of ADLs  - Assess for home care needs following discharge   - Consider OT consult to assist with ADL evaluation and planning for discharge  - Provide patient education as appropriate  Outcome: Progressing  Goal: Maintains/Returns to pre admission functional level  Description: INTERVENTIONS:  - Perform BMAT or MOVE assessment daily    - Set and communicate daily mobility goal to care team and patient/family/caregiver  - Collaborate with rehabilitation services on mobility goals if consulted  - Perform Range of Motion   - Reposition patient  - Dangle patient  - Stand patient   - Ambulate patient   - Out of bed to chair  - Out of bed for meals   - Out of bed for toileting  - Record patient progress and toleration of activity level   Outcome: Progressing     Problem: Knowledge Deficit  Goal: Patient/family/caregiver demonstrates understanding of disease process, treatment plan, medications, and discharge instructions  Description: Complete learning assessment and assess knowledge base    Interventions:  - Provide teaching at level of understanding  - Provide teaching via preferred learning methods  Outcome: Progressing  Goal: Verbalizes understanding of labor plan  Description: Assess patient/family/caregiver's baseline knowledge level and ability to understand information  Provide education via patient/family/caregiver's preferred learning method at appropriate level of understanding  1  Provide teaching at level of understanding  2  Provide teaching via preferred learning method(s)  Outcome: Progressing     Problem: DISCHARGE PLANNING  Goal: Discharge to home or other facility with appropriate resources  Description: INTERVENTIONS:  - Identify barriers to discharge w/patient and caregiver  - Arrange for needed discharge resources and transportation as appropriate  - Identify discharge learning needs (meds, wound care, etc )  - Arrange for interpretive services to assist at discharge as needed  - Refer to Case Management Department for coordinating discharge planning if the patient needs post-hospital services based on physician/advanced practitioner order or complex needs related to functional status, cognitive ability, or social support system  Outcome: Progressing     Problem: Labor & Delivery  Goal: Manages discomfort  Description: Assess and monitor for signs and symptoms of discomfort  Assess patient's pain level regularly and per hospital policy  Administer medications as ordered  Support use of nonpharmacological methods to help control pain such as distraction, imagery, relaxation, and application of heat and cold  Collaborate with interdisciplinary team and patient to determine appropriate pain management plan  1  Include patient in decisions related to comfort  2  Offer non-pharmacological pain management interventions  3  Report ineffective pain management to physician  Outcome: Progressing  Goal: Patient vital signs are stable  Description: 1  Assess vital signs - vaginal delivery    Outcome: Progressing     Problem: BIRTH - VAGINAL/ SECTION  Goal: Fetal and maternal status remain reassuring during the birth process  Description: INTERVENTIONS:  - Monitor vital signs  - Monitor fetal heart rate  - Monitor uterine activity  - Monitor labor progression (vaginal delivery)  - DVT prophylaxis  - Antibiotic prophylaxis  Outcome: Progressing  Goal: Emotionally satisfying birthing experience for mother/fetus  Description: Interventions:  - Assess, plan, implement and evaluate the nursing care given to the patient in labor  - Advocate the philosophy that each childbirth experience is a unique experience and support the family's chosen level of involvement and control during the labor process   - Actively participate in both the patient's and family's teaching of the birth process  - Consider cultural, Restorationism and age-specific factors and plan care for the patient in labor  Outcome: Progressing

## 2021-09-22 NOTE — PLAN OF CARE
Problem: PAIN - ADULT  Goal: Verbalizes/displays adequate comfort level or baseline comfort level  Description: Interventions:  - Encourage patient to monitor pain and request assistance  - Assess pain using appropriate pain scale  - Administer analgesics based on type and severity of pain and evaluate response  - Implement non-pharmacological measures as appropriate and evaluate response  - Consider cultural and social influences on pain and pain management  - Notify physician/advanced practitioner if interventions unsuccessful or patient reports new pain  Outcome: Progressing     Problem: INFECTION - ADULT  Goal: Absence or prevention of progression during hospitalization  Description: INTERVENTIONS:  - Assess and monitor for signs and symptoms of infection  - Monitor lab/diagnostic results  - Monitor all insertion sites, i e  indwelling lines, tubes, and drains  - Monitor endotracheal if appropriate and nasal secretions for changes in amount and color  - [place] appropriate cooling/warming therapies per order  - Administer medications as ordered  - Instruct and encourage patient and family to use good hand hygiene technique  - Identify and instruct in appropriate isolation precautions for identified infection/condition  Outcome: Progressing  Goal: Absence of fever/infection during neutropenic period  Description: INTERVENTIONS:  - Monitor WBC    Outcome: Progressing     Problem: SAFETY ADULT  Goal: Patient will remain free of falls  Description: INTERVENTIONS:  - Educate patient/family on patient safety including physical limitations  - Instruct patient to call for assistance with activity   - Consult OT/PT to assist with strengthening/mobility   - Keep Call bell within reach  - Keep bed low and locked with side rails adjusted as appropriate  - Keep care items and personal belongings within reach  - Initiate and maintain comfort rounds  - Make Fall Risk Sign visible to staff    - Apply yellow socks and bracelet for high fall risk patients  - Consider moving patient to room near nurses station  Outcome: Progressing  Goal: Maintain or return to baseline ADL function  Description: INTERVENTIONS:  -  Assess patient's ability to carry out ADLs; assess patient's baseline for ADL function and identify physical deficits which impact ability to perform ADLs (bathing, care of mouth/teeth, toileting, grooming, dressing, etc )  - Assess/evaluate cause of self-care deficits   - Assess range of motion  - Assess patient's mobility; develop plan if impaired  - Assess patient's need for assistive devices and provide as appropriate  - Encourage maximum independence but intervene and supervise when necessary  - Involve family in performance of ADLs  - Assess for home care needs following discharge   - Consider OT consult to assist with ADL evaluation and planning for discharge  - Provide patient education as appropriate  Outcome: Progressing  Goal: Maintains/Returns to pre admission functional level  Description: INTERVENTIONS:  - Perform BMAT or MOVE assessment daily    - Set and communicate daily mobility goal to care team and patient/family/caregiver  - Collaborate with rehabilitation services on mobility goals if consulted  - Out of bed for toileting  - Record patient progress and toleration of activity level   Outcome: Progressing     Problem: Knowledge Deficit  Goal: Patient/family/caregiver demonstrates understanding of disease process, treatment plan, medications, and discharge instructions  Description: Complete learning assessment and assess knowledge base  Interventions:  - Provide teaching at level of understanding  - Provide teaching via preferred learning methods  Outcome: Progressing  Goal: Verbalizes understanding of labor plan  Description: Assess patient/family/caregiver's baseline knowledge level and ability to understand information    Provide education via patient/family/caregiver's preferred learning method at appropriate level of understanding  1  Provide teaching at level of understanding  2  Provide teaching via preferred learning method(s)  Outcome: Progressing     Problem: DISCHARGE PLANNING  Goal: Discharge to home or other facility with appropriate resources  Description: INTERVENTIONS:  - Identify barriers to discharge w/patient and caregiver  - Arrange for needed discharge resources and transportation as appropriate  - Identify discharge learning needs (meds, wound care, etc )  - Arrange for interpretive services to assist at discharge as needed  - Refer to Case Management Department for coordinating discharge planning if the patient needs post-hospital services based on physician/advanced practitioner order or complex needs related to functional status, cognitive ability, or social support system  Outcome: Progressing     Problem: Labor & Delivery  Goal: Manages discomfort  Description: Assess and monitor for signs and symptoms of discomfort  Assess patient's pain level regularly and per hospital policy  Administer medications as ordered  Support use of nonpharmacological methods to help control pain such as distraction, imagery, relaxation, and application of heat and cold  Collaborate with interdisciplinary team and patient to determine appropriate pain management plan  1  Include patient in decisions related to comfort  2  Offer non-pharmacological pain management interventions  3  Report ineffective pain management to physician  Outcome: Progressing  Goal: Patient vital signs are stable  Description: 1  Assess vital signs - vaginal delivery    Outcome: Progressing     Problem: BIRTH - VAGINAL/ SECTION  Goal: Fetal and maternal status remain reassuring during the birth process  Description: INTERVENTIONS:  - Monitor vital signs  - Monitor fetal heart rate  - Monitor uterine activity  - Monitor labor progression (vaginal delivery)  - DVT prophylaxis  - Antibiotic prophylaxis  Outcome: Progressing  Goal: Emotionally satisfying birthing experience for mother/fetus  Description: Interventions:  - Assess, plan, implement and evaluate the nursing care given to the patient in labor  - Advocate the philosophy that each childbirth experience is a unique experience and support the family's chosen level of involvement and control during the labor process   - Actively participate in both the patient's and family's teaching of the birth process  - Consider cultural, Bahai and age-specific factors and plan care for the patient in labor  Outcome: Progressing

## 2021-09-22 NOTE — OB LABOR/OXYTOCIN SAFETY PROGRESS
Oxytocin Safety Progress Check Note - Juli Boyle 36 y o  female MRN: [id number]    Unit/Bed#: -01 Encounter: [id number]    Dose (blake-units/min) Oxytocin: 6 blake-units/min  Contraction Frequency (minutes): difficulty tracing  Contraction Quality: Mild  Tachysystole: No   Cervical Dilation: 1        Cervical Effacement: 50  Fetal Station: -3  Baseline Rate: 135 bpm  Fetal Heart Rate: 130 BPM  FHR Category: Category I               Vital Signs:   Vitals:    09/22/21 1621   BP: 104/58   Pulse: 65   Resp:    Temp: 97 9 °F (36 6 °C)           Notes/comments: Patient has been on pitocin for 2h  SVE as above  Cervix is softening  Still 1cm dilated  She is starting to feel the contracitons and currently rates them 6/10  FHT category 1  Will continue with pitocin titration          Marcelina Harris MD 9/22/2021 4:41 PM

## 2021-09-22 NOTE — H&P
H&P Exam - Obstetrics   Darrin Moran 36 y o  female MRN: [id number]  Unit/Bed#: -01 Encounter: [id number]      ASSESSMENT:  36yo  at 40w6d weeks gestation who is being admitted for IOL  EFW: 44% (via US on )  VTX by transabdominal ultrasound     PLAN:    [facility]  Follow up CBC, RPR, Blood Type  Start with cytotec  GBS negative   Analgesia and/or epidural at patient request  Anticipate     Discussed with Dr Mamie Schaumann      This patient will be an INPATIENT  and I certify the anticipated length of stay is >2 Midnights  History of Present Illness     Chief Complaint: Induction of labor    HPI:  Darrin Moran is a 36 y o   female with an CAT of 9/15/2021, by Last Menstrual Period at 40w6d weeks gestation who is being admitted for scheduled IOL  Contractions: no  Loss of fluid: no  Vaginal bleeding: no  Fetal movement: yes    She is SLOGA patient  PREGNANCY COMPLICATIONS:   1) S/p COVID ()    OB History    Para Term  AB Living   3 0 0 0 2 0   SAB TAB Ectopic Multiple Live Births   1 0 0 0 0      # Outcome Date GA Lbr Virgilio/2nd Weight Sex Delivery Anes PTL Lv   3 Current            2 AB            1 SAB                Baby complications/comments: none    Review of Systems   Constitutional: Negative for chills and fever  HENT: Negative for ear pain and sore throat  Eyes: Negative for pain and visual disturbance  Respiratory: Negative for cough and shortness of breath  Cardiovascular: Negative for chest pain and palpitations  Gastrointestinal: Negative for abdominal pain and vomiting  Genitourinary: Negative for dysuria, hematuria, pelvic pain, vaginal bleeding, vaginal discharge and vaginal pain  Musculoskeletal: Negative for arthralgias and back pain  Skin: Negative for color change and rash  Neurological: Negative for seizures, syncope and headaches  All other systems reviewed and are negative        Historical Information   Past Medical History:   Diagnosis Date    Chlamydia infection     Eczema      Past Surgical History:   Procedure Laterality Date    BREAST SURGERY Left 2008    benign mass age 15     INDUCED       [facility], 10 years ago   Haritha Roles WISDOM TOOTH EXTRACTION       Social History   Social History     Substance and Sexual Activity   Alcohol Use Yes    Comment: social      Social History     Substance and Sexual Activity   Drug Use No     Social History     Tobacco Use   Smoking Status Former Smoker    Packs/day: 1 00    Years: 3 00    Pack years: 3 00    Start date:     Quit date: 2014    Years since quittin 7   Smokeless Tobacco Former User     Family History:   Family History   Problem Relation Age of Onset    Nephrolithiasis Mother     Hypertension Father     Obesity Father     Depression Father     Nephrolithiasis Maternal Grandmother     Hypotension Maternal Grandfather     Melanoma Maternal Grandfather     Stroke Paternal Grandmother 79    Hypertension Paternal Grandmother     Heart disease Paternal Grandmother     Heart attack Paternal Grandfather 79    No Known Problems Sister        Meds/Allergies      Medications Prior to Admission   Medication    Prenatal MV-Min-Fe Fum-FA-DHA (PRENATAL+DHA PO)      No Known Allergies    OBJECTIVE:    Vitals: Blood pressure 139/80, pulse 85, temperature 98 5 °F (36 9 °C), temperature source Oral, resp  rate 18, last menstrual period 2020  There is no height or weight on file to calculate BMI  Physical Exam  Constitutional:       Appearance: Normal appearance  Eyes:      Pupils: Pupils are equal, round, and reactive to light  Cardiovascular:      Rate and Rhythm: Normal rate and regular rhythm  Pulses: Normal pulses  Heart sounds: Normal heart sounds  No murmur heard  Pulmonary:      Effort: Pulmonary effort is normal  No respiratory distress  Breath sounds: Normal breath sounds  No wheezing     Abdominal: Palpations: Abdomen is soft  Comments: Gravid   Musculoskeletal:         General: No swelling or tenderness  Normal range of motion  Cervical back: Normal range of motion  Skin:     General: Skin is warm and dry  Capillary Refill: Capillary refill takes less than 2 seconds  Neurological:      General: No focal deficit present  Mental Status: She is alert and oriented to person, place, and time  Psychiatric:         Mood and Affect: Mood normal          Behavior: Behavior normal          Cervix: closed/30/-3       Fetal heart rate: 120 bpm, moderate variability, positive accelerations, negative decelerations    [place]: very mild contractions    GBS: negative    Prenatal Labs: I have personally reviewed pertinent reports    , Blood Type:   Lab Results   Component Value Date/Time    ABO Grouping O 02/24/2021 05:34 PM     , D (Rh type):   Lab Results   Component Value Date/Time    Rh Factor Positive 02/24/2021 05:34 PM   HCT/HGB:   Lab Results   Component Value Date/Time    Hematocrit 36 5 06/22/2021 05:15 PM    Hemoglobin 12 2 06/22/2021 05:15 PM      , MCV:   Lab Results   Component Value Date/Time    MCV 93 06/22/2021 05:15 PM      , Platelets:   Lab Results   Component Value Date/Time    Platelets 437 72/69/5667 05:15 PM      , 1 hour Glucola:   Lab Results   Component Value Date/Time    Glucose 108 06/22/2021 05:15 PM   ,  Rubella:   Lab Results   Component Value Date/Time    Rubella IgG Quant 73 8 02/24/2021 05:34 PM        , VDRL/RPR:   Lab Results   Component Value Date/Time    RPR Non-Reactive 06/22/2021 05:15 PM      , Urine Culture/Screen:   Lab Results   Component Value Date/Time    Urine Culture 50,000-59,000 cfu/ml  02/24/2021 05:34 PM       ,Hep B:   Lab Results   Component Value Date/Time    Hepatitis B Surface Ag Non-reactive 02/24/2021 05:34 PM     , HIV:   Lab Results   Component Value Date/Time    HIV-1/HIV-2 Ab Non-Reactive 02/24/2021 05:34 PM     , Gonorrhea:   Lab Results   Component Value Date/Time    N gonorrhoeae, DNA Probe Negative 03/04/2021 05:15 PM     , Group B Strep:    Lab Results   Component Value Date/Time    Strep Grp B PCR Negative 08/18/2021 05:31 PM          Invasive Devices     None                     Ananya aHnd MD  9/21/2021  10:45 PM

## 2021-09-22 NOTE — OB LABOR/OXYTOCIN SAFETY PROGRESS
Labor Progress Note - [place] Arash 36 y o  female MRN: [id number]    Unit/Bed#: -01 Encounter: [id number]       Contraction Frequency (minutes): 4-6 5  Contraction Quality: Mild  Tachysystole: No   Cervical Dilation: 1        Cervical Effacement: 50  Fetal Station: -3  Baseline Rate: 130 bpm  Fetal Heart Rate: 122 BPM  FHR Category: Category I           Vital Signs:   Vitals:    09/22/21 0845   BP: 118/68   Pulse:    Resp:    Temp:    Notes/comments:   Pt had been walking around and just started sat in bed  SVE as above  Patient declined newman balloon  Discussed starting pitocin, patient is agreeable  Will start low dose pitocin now and titrate per protocol      D/w Dr Juan Antonio Torres MD 9/22/2021 1:32 PM

## 2021-09-22 NOTE — OB LABOR/OXYTOCIN SAFETY PROGRESS
Labor Progress Note - Anthony Cespedes 36 y o  female MRN: [id number]    Unit/Bed#: -01 Encounter: [id number]       Contraction Frequency (minutes): 2-2 5  Contraction Quality: Mild  Tachysystole: No   Cervical Dilation: Closed        Cervical Effacement: 30  Fetal Station: -3  Baseline Rate: 135 bpm  Fetal Heart Rate: 148 BPM  FHR Category: Category I               Vital Signs:   Vitals:    09/22/21 0845   BP: 118/68   Pulse:    Resp:    Temp:            Notes/comments:    Minimal change  Place next cytotec after breakfast     Reviewed options: cytotec, newman balloon, pitocin, +/- epidural at any step in the process        Zane Salazar MD 9/22/2021 9:14 AM

## 2021-09-22 NOTE — OB LABOR/OXYTOCIN SAFETY PROGRESS
Labor Progress Note - Paul Segovia 36 y o  female MRN: [id number]    Unit/Bed#: -01 Encounter: [id number]       Contraction Frequency (minutes): irregular  Contraction Quality: Mild  Tachysystole: No   Cervical Dilation: Closed        Cervical Effacement: 30  Fetal Station: -3  Baseline Rate: 120 bpm  Fetal Heart Rate: 150 BPM  FHR Category: Category I               Vital Signs:   Vitals:    09/22/21 0026   BP: 128/70   Pulse: 77   Resp: 18   Temp: 98 2 °F (36 8 °C)       Notes/comments:   Cytotec #1 placed, patient tolerated well         Merna Rendon MD 9/22/2021 4:36 AM

## 2021-09-23 LAB
BASE EXCESS BLDCOA CALC-SCNC: -6.4 MMOL/L (ref 3–11)
BASE EXCESS BLDCOV CALC-SCNC: -5.3 MMOL/L (ref 1–9)
HCO3 BLDCOA-SCNC: 22.5 MMOL/L (ref 17.3–27.3)
HCO3 BLDCOV-SCNC: 18.5 MMOL/L (ref 12.2–28.6)
O2 CT VFR BLDCOA CALC: 3.4 ML/DL
OXYHGB MFR BLDCOA: 15.7 %
OXYHGB MFR BLDCOV: 73 %
PCO2 BLDCOA: 58.3 MM[HG] (ref 30–60)
PCO2 BLDCOV: 31.8 MM HG (ref 27–43)
PH BLDCOA: 7.2 [PH] (ref 7.23–7.43)
PH BLDCOV: 7.38 [PH] (ref 7.19–7.49)
PO2 BLDCOA: 11 MM HG (ref 5–25)
PO2 BLDCOV: 31.1 MM HG (ref 15–45)
SAO2 % BLDCOV: 17.3 ML/DL

## 2021-09-23 PROCEDURE — 10907ZC DRAINAGE OF AMNIOTIC FLUID, THERAPEUTIC FROM PRODUCTS OF CONCEPTION, VIA NATURAL OR ARTIFICIAL OPENING: ICD-10-PCS | Performed by: OBSTETRICS & GYNECOLOGY

## 2021-09-23 PROCEDURE — 3E0E77Z INTRODUCTION OF ELECTROLYTIC AND WATER BALANCE SUBSTANCE INTO PRODUCTS OF CONCEPTION, VIA NATURAL OR ARTIFICIAL OPENING: ICD-10-PCS

## 2021-09-23 PROCEDURE — 10H073Z INSERTION OF MONITORING ELECTRODE INTO PRODUCTS OF CONCEPTION, VIA NATURAL OR ARTIFICIAL OPENING: ICD-10-PCS

## 2021-09-23 PROCEDURE — 10H07YZ INSERTION OF OTHER DEVICE INTO PRODUCTS OF CONCEPTION, VIA NATURAL OR ARTIFICIAL OPENING: ICD-10-PCS

## 2021-09-23 PROCEDURE — 82805 BLOOD GASES W/O2 SATURATION: CPT | Performed by: STUDENT IN AN ORGANIZED HEALTH CARE EDUCATION/TRAINING PROGRAM

## 2021-09-23 PROCEDURE — 4A1H7CZ MONITORING OF PRODUCTS OF CONCEPTION, CARDIAC RATE, VIA NATURAL OR ARTIFICIAL OPENING: ICD-10-PCS

## 2021-09-23 PROCEDURE — 0HQ9XZZ REPAIR PERINEUM SKIN, EXTERNAL APPROACH: ICD-10-PCS | Performed by: OBSTETRICS & GYNECOLOGY

## 2021-09-23 RX ORDER — IBUPROFEN 600 MG/1
600 TABLET ORAL EVERY 6 HOURS PRN
Status: DISCONTINUED | OUTPATIENT
Start: 2021-09-23 | End: 2021-09-25 | Stop reason: HOSPADM

## 2021-09-23 RX ORDER — ACETAMINOPHEN 325 MG/1
650 TABLET ORAL EVERY 6 HOURS PRN
Status: DISCONTINUED | OUTPATIENT
Start: 2021-09-23 | End: 2021-09-23

## 2021-09-23 RX ORDER — FENTANYL CITRATE 50 UG/ML
INJECTION, SOLUTION INTRAMUSCULAR; INTRAVENOUS
Status: COMPLETED
Start: 2021-09-23 | End: 2021-09-23

## 2021-09-23 RX ORDER — DIAPER,BRIEF,INFANT-TODD,DISP
1 EACH MISCELLANEOUS 4 TIMES DAILY PRN
Status: DISCONTINUED | OUTPATIENT
Start: 2021-09-23 | End: 2021-09-25 | Stop reason: HOSPADM

## 2021-09-23 RX ORDER — BUPIVACAINE HYDROCHLORIDE 5 MG/ML
INJECTION, SOLUTION PERINEURAL AS NEEDED
Status: DISCONTINUED | OUTPATIENT
Start: 2021-09-23 | End: 2021-09-25 | Stop reason: HOSPADM

## 2021-09-23 RX ORDER — EPHEDRINE SULFATE 50 MG/ML
INJECTION INTRAVENOUS AS NEEDED
Status: DISCONTINUED | OUTPATIENT
Start: 2021-09-23 | End: 2021-09-25 | Stop reason: HOSPADM

## 2021-09-23 RX ORDER — ONDANSETRON 2 MG/ML
4 INJECTION INTRAMUSCULAR; INTRAVENOUS EVERY 8 HOURS PRN
Status: DISCONTINUED | OUTPATIENT
Start: 2021-09-23 | End: 2021-09-25 | Stop reason: HOSPADM

## 2021-09-23 RX ORDER — FENTANYL CITRATE 50 UG/ML
INJECTION, SOLUTION INTRAMUSCULAR; INTRAVENOUS AS NEEDED
Status: DISCONTINUED | OUTPATIENT
Start: 2021-09-23 | End: 2021-09-25 | Stop reason: HOSPADM

## 2021-09-23 RX ORDER — ACETAMINOPHEN 325 MG/1
650 TABLET ORAL EVERY 6 HOURS PRN
Status: DISCONTINUED | OUTPATIENT
Start: 2021-09-23 | End: 2021-09-25 | Stop reason: HOSPADM

## 2021-09-23 RX ORDER — DOCUSATE SODIUM 100 MG/1
100 CAPSULE, LIQUID FILLED ORAL 2 TIMES DAILY
Status: DISCONTINUED | OUTPATIENT
Start: 2021-09-24 | End: 2021-09-25 | Stop reason: HOSPADM

## 2021-09-23 RX ORDER — DIPHENHYDRAMINE HCL 25 MG
25 TABLET ORAL EVERY 6 HOURS PRN
Status: DISCONTINUED | OUTPATIENT
Start: 2021-09-23 | End: 2021-09-25 | Stop reason: HOSPADM

## 2021-09-23 RX ORDER — SODIUM CHLORIDE, SODIUM LACTATE, POTASSIUM CHLORIDE, CALCIUM CHLORIDE 600; 310; 30; 20 MG/100ML; MG/100ML; MG/100ML; MG/100ML
125 INJECTION, SOLUTION INTRAVENOUS CONTINUOUS
Status: DISCONTINUED | OUTPATIENT
Start: 2021-09-23 | End: 2021-09-23

## 2021-09-23 RX ORDER — CALCIUM CARBONATE 200(500)MG
1000 TABLET,CHEWABLE ORAL DAILY PRN
Status: DISCONTINUED | OUTPATIENT
Start: 2021-09-23 | End: 2021-09-25 | Stop reason: HOSPADM

## 2021-09-23 RX ADMIN — ONDANSETRON 4 MG: 2 INJECTION INTRAMUSCULAR; INTRAVENOUS at 01:09

## 2021-09-23 RX ADMIN — ROPIVACAINE HYDROCHLORIDE: 2 INJECTION, SOLUTION EPIDURAL; INFILTRATION at 21:57

## 2021-09-23 RX ADMIN — SODIUM CHLORIDE, SODIUM LACTATE, POTASSIUM CHLORIDE, AND CALCIUM CHLORIDE 1000 ML: .6; .31; .03; .02 INJECTION, SOLUTION INTRAVENOUS at 12:00

## 2021-09-23 RX ADMIN — ROPIVACAINE HYDROCHLORIDE: 2 INJECTION, SOLUTION EPIDURAL; INFILTRATION at 05:46

## 2021-09-23 RX ADMIN — SODIUM CHLORIDE, SODIUM LACTATE, POTASSIUM CHLORIDE, AND CALCIUM CHLORIDE 125 ML/HR: .6; .31; .03; .02 INJECTION, SOLUTION INTRAVENOUS at 16:53

## 2021-09-23 RX ADMIN — BUPIVACAINE HYDROCHLORIDE 3 ML: 5 INJECTION, SOLUTION PERINEURAL at 16:26

## 2021-09-23 RX ADMIN — ROPIVACAINE HYDROCHLORIDE: 2 INJECTION, SOLUTION EPIDURAL; INFILTRATION at 13:22

## 2021-09-23 RX ADMIN — Medication 250 MILLI-UNITS/MIN: at 22:47

## 2021-09-23 RX ADMIN — SODIUM CHLORIDE, SODIUM LACTATE, POTASSIUM CHLORIDE, AND CALCIUM CHLORIDE 125 ML/HR: .6; .31; .03; .02 INJECTION, SOLUTION INTRAVENOUS at 02:56

## 2021-09-23 RX ADMIN — ONDANSETRON 4 MG: 2 INJECTION INTRAMUSCULAR; INTRAVENOUS at 21:01

## 2021-09-23 RX ADMIN — EPHEDRINE SULFATE 5 MG: 50 INJECTION, SOLUTION INTRAVENOUS at 00:21

## 2021-09-23 RX ADMIN — SODIUM CHLORIDE, SODIUM LACTATE, POTASSIUM CHLORIDE, AND CALCIUM CHLORIDE 125 ML/HR: .6; .31; .03; .02 INJECTION, SOLUTION INTRAVENOUS at 10:45

## 2021-09-23 RX ADMIN — SODIUM CHLORIDE, SODIUM LACTATE, POTASSIUM CHLORIDE, AND CALCIUM CHLORIDE 125 ML/HR: .6; .31; .03; .02 INJECTION, SOLUTION INTRAVENOUS at 20:41

## 2021-09-23 RX ADMIN — FENTANYL CITRATE 100 MCG: 50 INJECTION, SOLUTION INTRAMUSCULAR; INTRAVENOUS at 16:26

## 2021-09-23 NOTE — OB LABOR/OXYTOCIN SAFETY PROGRESS
Oxytocin Safety Progress Check Note - Juli Boyle 36 y o  female MRN: [id number]    Unit/Bed#: -01 Encounter: [id number]    Dose (blake-units/min) Oxytocin: 20 blake-units/min  Contraction Frequency (minutes): 1 5-2 5  Contraction Quality: Mild  Tachysystole: No   Cervical Dilation: 1        Cervical Effacement: 50  Fetal Station: -3  Baseline Rate: 130 bpm  Fetal Heart Rate: 134 BPM  FHR Category: Category I               Vital Signs:   Vitals:    09/22/21 2330   BP: 105/61   Pulse: 61   Resp:    Temp:    SpO2:            Notes/comments:    FB placed blindly  Inadvertent AROM occurred while inflating balloon  Due to need for cervical ripening, will keep newman balloon in place for now  Continue pitocin titration    D/w Dr Vanna Gleason MD 9/23/2021 12:03 AM

## 2021-09-23 NOTE — OB LABOR/OXYTOCIN SAFETY PROGRESS
Labor Progress Note - Anthony Cespedes 36 y o  female MRN: [id number]    Unit/Bed#: -01 Encounter: [id number]    Dose (blake-units/min) Oxytocin: 28 blake-units/min  Contraction Frequency (minutes): 2-3  Contraction Quality: Moderate, Strong  Tachysystole: No   Cervical Dilation: 5        Cervical Effacement: 80  Fetal Station: -1  Baseline Rate: 130 bpm  Fetal Heart Rate: 130 BPM  FHR Category: Category I  Oxytocin Safety Progress Check: Safety check completed            Vital Signs:   Vitals:    09/23/21 1030   BP: 117/65   Pulse:    Resp:    Temp:    SpO2:            Notes/comments:   Pt comfortable with epidural    SVE unchanged     Plan to place Aron Howard MD 9/23/2021 10:49 AM

## 2021-09-23 NOTE — ANESTHESIA PROCEDURE NOTES
Epidural Block    Patient location during procedure: OB  Start time: 9/22/2021 8:56 PM  Reason for block: procedure for pain and at surgeon's request  Staffing  Performed: Anesthesiologist   Anesthesiologist: Brandi Ibarra MD  Preanesthetic Checklist  Completed: patient identified, IV checked, site marked, risks and benefits discussed, surgical consent, monitors and equipment checked, pre-op evaluation and timeout performed  Epidural  Patient position: sitting  Prep: ChloraPrep  Patient monitoring: cardiac monitor and frequent blood pressure checks  Approach: midline  Location: lumbar  Injection technique: ADITYA air  Needle  Needle type: Tuohy   Needle gauge: 18 G  Catheter type: side hole  Catheter size: 20 G  Catheter at skin depth: 11 5 cm  Test dose: negativelidocaine 1 5% with epinephrine 1:200,000 test dose, 3 mL  Assessment  Sensory level: T10  Number of attempts: 1negative aspiration for CSF, negative aspiration for heme and no paresthesia on injection  patient tolerated the procedure well with no immediate complications  Additional Notes  Unremarkable epidural block

## 2021-09-23 NOTE — OB LABOR/OXYTOCIN SAFETY PROGRESS
Oxytocin Safety Progress Check Note - Isidro Silva 36 y o  female MRN: [id number]    Unit/Bed#: -01 Encounter: [id number]    Dose (blake-units/min) Oxytocin: 0 blake-units/min  Contraction Frequency (minutes): 2-3  Contraction Quality: Moderate, Strong  Tachysystole: No   Cervical Dilation: 5        Cervical Effacement: 80  Fetal Station: -1  Baseline Rate: 155 bpm  Fetal Heart Rate: 160 BPM  FHR Category: Category II  Oxytocin Safety Progress Check: Safety check completed            Vital Signs:   Vitals:    21 1145   BP: 124/58   Pulse:    Resp:    Temp:    SpO2:            Notes/comments:  Pitocin turned off due to recurrent deep variable decelerations, she has been repositioned and received a fluid bolus, IUPC and FSE placed for better monitoring of contractions, will be starting an amnioinfusion and will restart pit when able    Patient has made minimal cervical change, discussion started about possibility of  section if she continues to not make change       Vicky Hernandez MD 2021 12:01 PM

## 2021-09-23 NOTE — UTILIZATION REVIEW
Initial Clinical Review    Admission: Date/Time/Statement:   Admission Orders (From admission, onward)     Ordered        218  Inpatient Admission  Once                   Orders Placed This Encounter   Procedures    Inpatient Admission     Standing Status:   Standing     Number of Occurrences:   1     Order Specific Question:   Level of Care     Answer:   Med Surg [16]     Order Specific Question:   Estimated length of stay     Answer:   More than 2 Midnights     Order Specific Question:   Certification     Answer:   I certify that inpatient services are medically necessary for this patient for a duration of greater than two midnights  See H&P and MD Progress Notes for additional information about the patient's course of treatment  ED Arrival Information     Patient not seen in ED                     Chief Complaint   Patient presents with   Molltammi Hoffmanr Scheduled Induction       Initial Presentation: On  32yo PMH Covid infection in April at [address] recovered,  at 40w6d weeks gestation admitted for IOL  On exam 3, received x2 doses Cytotec, initially declined newman balloon & received IV PIT titration for augmentation, after sev hours of minimal change accept Newman balloon for mechanical dilatation w steady cervical change, AROM  Required an amnio infusion due to recurrent variable decels  IV Pit break w ongoing IV Pit titration for  on  @ 2247 for viable baby girl apgars 8&(, birthweight 0 GM  Date: 9/22   Day 2:   4:36 AM Cervical Dilation: Closed, cytotec #1  9:14 am  2-2 5 Cervical Dilation: Closed, next cytotec  1:32 PM initially:  Patient declined newman , Cervical Dilation: 1  4:41 PM    Oxytocin: 6 blake-units/min, on pitocin for 2h  SVE as above  Cervix is softening  Still 1cm dilated  2021 8:56 PM Epidural     2021  12:03 am Inadvertent AROM occurred while inflating balloon; Oxytocin: 20 blake-units/min  Cervical Dilation: 1  2021 7:27  AM   SVE 4 5//-1   Pit at 28 mU/min  9/23 @ 2247 Vaginal Delivery Summary - Procedure: Spontaneous Vaginal Delivery   Anesthesia: Epidural   Findings:  1  Viable female on 9/23/21 at 2247, with APGARS of 8 and 9 at 1 and 5, birthwt 3310 gm    Triage Vitals   Temperature Pulse Respirations Blood Pressure SpO2   09/21/21 2152 09/21/21 2152 09/21/21 2152 09/21/21 2152 09/22/21 2100   98 5 °F (36 9 °C) 85 18 139/80 98 %      Temp Source Heart Rate Source Patient Position - Orthostatic VS BP Location FiO2 (%)   09/21/21 2152 09/21/21 2152 09/22/21 0026 09/21/21 2152 --   Oral Monitor Lying Right arm       Pain Score       09/21/21 2152       No Pain          Wt Readings from Last 1 Encounters:   09/21/21 101 kg (222 lb)     Additional Vital Signs:   Date/Time  Temp  Pulse  Resp  BP  SpO2  O2 Device  Cardiac (WDL)  Patient Position - Orthostatic VS   09/24/21 1232  97 6 °F (36 4 °C)  81  18  120/64  --  --  --  Sitting   09/24/21 0925  97 6 °F (36 4 °C)  63  18  105/59  --  --  --  Sitting   09/24/21 0915  --  --  --  --  --  None (Room air)  WDL  --   09/24/21 0105  --  88  16  109/62  --  None (Room air)  WDL  Sitting   09/24/21 0050  99 3 °F (37 4 °C)  92  --  118/67  --  --  --       09/23/21 0013  --  74  --  89/57Abnormal   --  --  --  --   09/23/21 0000  --  65  --  94/51  --  --  --  --   Comment rows:   OBSERV: per Dr Kerrie Mendoza ok to keep newman balloon in place while ruptured at 09/23/21 0000   09/22/21 2345  --  82  --  118/69  --  --  --  --   Comment rows:   OBSERV: newman ball placed at this time with AROM by dr Babs Hatch   60cc of clear fluid inserted into balloon at 09/22/21 2345   09/22/21 2330  --  61  --  105/61  --  --  --  --   09/22/21 2315  --  62  --  111/62  --  --  --  --   09/22/21 2300  --  66  --  112/66  --  --  --  --     09/22/21 0437  97 6 °F (36 4 °C)  81  18  115/62  --  --  --  --   09/22/21 0026  98 2 °F (36 8 °C)  77  18  128/70  --  --  --  Lying      Weights (last 14 days)    Date/Time  Weight  Weight Method Height   09/21/21 2152  101 kg (222 lb)  Stated  5' 9" (1 753 m)           Pertinent Labs/Diagnostic Test Results:       Results from last 7 days   Lab Units 09/21/21  2242   WBC Thousand/uL 11 61*   HEMOGLOBIN g/dL 12 5   HEMATOCRIT % 36 4   PLATELETS Thousands/uL 242   NEUTROS ABS Thousands/µL 7 83*     ED Treatment:   Medication Administration - No Administrations Displayed (No Start Event Found)     None        Past Medical History:   Diagnosis Date    Chlamydia infection     Eczema      Present on Admission:  **None**      Admitting Diagnosis: 40 weeks gestation of pregnancy [Z3A 40]  Single liveborn infant, delivered vaginally [Z38 00]  Age/Sex: 36 y o  female  Admission Orders:  External continuous uterine contraction & fetal HR monitoring  Scheduled Medications:  docusate sodium, 100 mg, Oral, BID    Continuous IV Infusions:  lactated ringers, 125 mL/hr, Intravenous, Continuous  oxytocin, 1-30 blake-units/min, Intravenous, Titrated  ropivacaine 0 2%, , Epidural, Continuous    PRN Meds:  acetaminophen, 650 mg, Oral, Q6H PRN  benzocaine-menthol-lanolin-aloe, , Topical, 4x Daily PRN  calcium carbonate, 1,000 mg, Oral, Daily PRN  diphenhydrAMINE, 25 mg, Oral, Q6H PRN  hydrocortisone, 1 application, Topical, 4x Daily PRN  ibuprofen, 600 mg, Oral, Q6H PRN  ondansetron, 4 mg, Intravenous, Q8H PRN  witch hazel-glycerin, 1 pad, Topical, Q2H PRN    IP CONSULT TO ANESTHESIOLOGY  Network Utilization Review Department  ATTENTION: Please call with any questions or concerns to [phone] and carefully listen to the prompts so that you are directed to the right person  All voicemails are confidential   Erna Doan all requests for admission clinical reviews, approved or denied determinations and any other requests to dedicated fax number below belonging to the campus where the patient is receiving treatment   List of dedicated fax numbers for the Facilities:  FACILITY NAME UR FAX NUMBER   ADMISSION DENIALS (Administrative/Medical Necessity) [phone]   [address] (Maternity/NICU/Pediatrics) [address] [facility],[address] [facility] [address] [place] [address]  [phone]   Lucina Mclaughlin [phone] [place] [address] [address]ie García [address] [facility] [phone]

## 2021-09-23 NOTE — OB LABOR/OXYTOCIN SAFETY PROGRESS
Oxytocin Safety Progress Check Note - Madelyn Moreno 36 y o  female MRN: [id number]    Unit/Bed#: -01 Encounter: [id number]    Dose (blake-units/min) Oxytocin: 4 blake-units/min  Contraction Frequency (minutes): 2-3  Contraction Quality: Moderate  Tachysystole: No   Cervical Dilation: Lip/rim (Comment)        Cervical Effacement: 90  Fetal Station: 1  Baseline Rate: 145 bpm  Fetal Heart Rate: 140 BPM  FHR Category: Category II  Oxytocin Safety Progress Check: Safety check completed            Vital Signs:   Vitals:    09/23/21 1935   BP: 127/68   Pulse: 94   Resp:    Temp:    SpO2:            Notes/comments:    Patient feeling rectal pressure  SVE 9 5/90/+1  Right anterior lip present  FHT cat 1  Pit at 4 mU/min, toco q2-3m  Plan for delivery      Sebastián Steinberg MD 9/23/2021 7:56 PM

## 2021-09-23 NOTE — PLAN OF CARE
Problem: PAIN - ADULT  Goal: Verbalizes/displays adequate comfort level or baseline comfort level  Description: Interventions:  - Encourage patient to monitor pain and request assistance  - Assess pain using appropriate pain scale  - Administer analgesics based on type and severity of pain and evaluate response  - Implement non-pharmacological measures as appropriate and evaluate response  - Consider cultural and social influences on pain and pain management  - Notify physician/advanced practitioner if interventions unsuccessful or patient reports new pain  Outcome: Progressing     Problem: Knowledge Deficit  Goal: Patient/family/caregiver demonstrates understanding of disease process, treatment plan, medications, and discharge instructions  Description: Complete learning assessment and assess knowledge base  Interventions:  - Provide teaching at level of understanding  - Provide teaching via preferred learning methods  Outcome: Progressing  Goal: Verbalizes understanding of labor plan  Description: Assess patient/family/caregiver's baseline knowledge level and ability to understand information  Provide education via patient/family/caregiver's preferred learning method at appropriate level of understanding  1  Provide teaching at level of understanding  2  Provide teaching via preferred learning method(s)  Outcome: Progressing     Problem: Labor & Delivery  Goal: Manages discomfort  Description: Assess and monitor for signs and symptoms of discomfort  Assess patient's pain level regularly and per hospital policy  Administer medications as ordered  Support use of nonpharmacological methods to help control pain such as distraction, imagery, relaxation, and application of heat and cold  Collaborate with interdisciplinary team and patient to determine appropriate pain management plan  1  Include patient in decisions related to comfort  2  Offer non-pharmacological pain management interventions    3  Report ineffective pain management to physician  Outcome: Progressing  Goal: Patient vital signs are stable  Description: 1  Assess vital signs - vaginal delivery    Outcome: Progressing     Problem: BIRTH - VAGINAL/ SECTION  Goal: Fetal and maternal status remain reassuring during the birth process  Description: INTERVENTIONS:  - Monitor vital signs  - Monitor fetal heart rate  - Monitor uterine activity  - Monitor labor progression (vaginal delivery)  - DVT prophylaxis  - Antibiotic prophylaxis  Outcome: Progressing  Goal: Emotionally satisfying birthing experience for mother/fetus  Description: Interventions:  - Assess, plan, implement and evaluate the nursing care given to the patient in labor  - Advocate the philosophy that each childbirth experience is a unique experience and support the family's chosen level of involvement and control during the labor process   - Actively participate in both the patient's and family's teaching of the birth process  - Consider cultural, Sabianism and age-specific factors and plan care for the patient in labor  Outcome: Progressing

## 2021-09-23 NOTE — OB LABOR/OXYTOCIN SAFETY PROGRESS
Oxytocin Safety Progress Check Note - Zada Fraction 36 y o  female MRN: [id number]    Unit/Bed#: -01 Encounter: [id number]    Dose (blake-units/min) Oxytocin: 22 blake-units/min  Contraction Frequency (minutes): 2-4  Contraction Quality: Mild  Tachysystole: No   Cervical Dilation: 4        Cervical Effacement: 70  Fetal Station: -2  Baseline Rate: 130 bpm  Fetal Heart Rate: 123 BPM  FHR Category: Category I               Vital Signs:   Vitals:    09/23/21 0315   BP: 109/61   Pulse: (P) 73   Resp:    Temp:    SpO2:            Notes/comments:    SVE 4/70/-2  S/p FB and AROM  Patient comfortable with epidural  Pit at 22 mU/min, toco q3-4m  FHT category 1  Continue titration as tolerated       Mila Jones MD 9/23/2021 3:21 AM

## 2021-09-23 NOTE — PLAN OF CARE
Problem: PAIN - ADULT  Goal: Verbalizes/displays adequate comfort level or baseline comfort level  Description: Interventions:  - Encourage patient to monitor pain and request assistance  - Assess pain using appropriate pain scale  - Administer analgesics based on type and severity of pain and evaluate response  - Implement non-pharmacological measures as appropriate and evaluate response  - Consider cultural and social influences on pain and pain management  - Notify physician/advanced practitioner if interventions unsuccessful or patient reports new pain  2021 by Edwin Lennon RN  Outcome: Progressing  2021 by Hyun Hanna RN  Outcome: Progressing     Problem: Labor & Delivery  Goal: Manages discomfort  Description: Assess and monitor for signs and symptoms of discomfort  Assess patient's pain level regularly and per hospital policy  Administer medications as ordered  Support use of nonpharmacological methods to help control pain such as distraction, imagery, relaxation, and application of heat and cold  Collaborate with interdisciplinary team and patient to determine appropriate pain management plan  1  Include patient in decisions related to comfort  2  Offer non-pharmacological pain management interventions  3  Report ineffective pain management to physician  2021 by Edwin Lennon RN  Outcome: Progressing  2021 by Hyun Hanna RN  Outcome: Progressing  Goal: Patient vital signs are stable  Description: 1  Assess vital signs - vaginal delivery    2021 by Edwin Lennon RN  Outcome: Progressing  2021 by Hyun Hanna RN  Outcome: Progressing     Problem: BIRTH - VAGINAL/ SECTION  Goal: Fetal and maternal status remain reassuring during the birth process  Description: INTERVENTIONS:  - Monitor vital signs  - Monitor fetal heart rate  - Monitor uterine activity  - Monitor labor progression (vaginal delivery)  - DVT prophylaxis  - Antibiotic prophylaxis  9/23/2021 1519 by Marcus Ratliff RN  Outcome: Progressing  9/23/2021 0939 by Marcus Ratliff RN  Outcome: Progressing  Goal: Emotionally satisfying birthing experience for mother/fetus  Description: Interventions:  - Assess, plan, implement and evaluate the nursing care given to the patient in labor  - Advocate the philosophy that each childbirth experience is a unique experience and support the family's chosen level of involvement and control during the labor process   - Actively participate in both the patient's and family's teaching of the birth process  - Consider cultural, Restorationism and age-specific factors and plan care for the patient in labor  9/23/2021 1519 by Marcus Ratliff RN  Outcome: Progressing  9/23/2021 0939 by Lee Ann Hanna RN  Outcome: Progressing

## 2021-09-23 NOTE — OB LABOR/OXYTOCIN SAFETY PROGRESS
Oxytocin Safety Progress Check Note - Elizabeth Contreras 36 y o  female MRN: [id number]    Unit/Bed#: -01 Encounter: [id number]    Dose (blake-units/min) Oxytocin: 28 blake-units/min  Contraction Frequency (minutes): 2-4  Contraction Quality: Moderate  Tachysystole: No   Cervical Dilation: 5        Cervical Effacement: 70  Fetal Station: -1  Baseline Rate: 135 bpm  Fetal Heart Rate: 138 BPM  FHR Category: Category I               Vital Signs:   Vitals:    09/23/21 0800   BP: 118/70   Pulse:    Resp:    Temp:    SpO2:            Notes/comments: Max pitocin out to 30; if no change with next check place [id number] Cassidy Lee MD 9/23/2021 8:38 AM

## 2021-09-23 NOTE — OB LABOR/OXYTOCIN SAFETY PROGRESS
Oxytocin Safety Progress Check Note - Elizabeth Contreras 36 y o  female MRN: [id number]    Unit/Bed#: -01 Encounter: [id number]    Dose (blake-units/min) Oxytocin: 28 blake-units/min  Contraction Frequency (minutes): 2-3  Contraction Quality: Moderate  Tachysystole: No   Cervical Dilation: 4-5        Cervical Effacement: 70  Fetal Station: -1  Baseline Rate: 130 bpm  Fetal Heart Rate: 122 BPM  FHR Category: Category I               Vital Signs:   Vitals:    09/23/21 0645   BP: 109/60   Pulse:    Resp:    Temp:    SpO2:            Notes/comments:    SVE 4 5/70/-1  Pit at 28 mU/min, toco q1-2m  FHT category 1  Continue titration as tolerated       Humberto Abad MD 9/23/2021 7:27 AM Problem: Skin Integrity:  Goal: Will show no infection signs and symptoms  Description: Will show no infection signs and symptoms  Outcome: Met This Shift  Goal: Absence of new skin breakdown  Description: Absence of new skin breakdown  Outcome: Met This Shift

## 2021-09-23 NOTE — ANESTHESIA PREPROCEDURE EVALUATION
Procedure:  LABOR ANALGESIA    Relevant Problems   GYN   (+) 40 weeks gestation of pregnancy   (+) Supervision of other normal pregnancy      NEURO/PSYCH   (+) History of benign breast tumor        Physical Exam    Airway      TM Distance: >3 FB  Neck ROM: full     Dental   No notable dental hx     Cardiovascular  Rhythm: regular, Rate: normal, Cardiovascular exam normal    Pulmonary  Pulmonary exam normal Breath sounds clear to auscultation,     Other Findings        Anesthesia Plan  ASA Score- 2     Anesthesia Type- epidural with ASA Monitors  Additional Monitors:   Airway Plan:     Comment: Discussed the risks/benefits of neuraxial anesthesia, including risk of bleeding/infection/damage to underlying structures, the likely side effect of nausea, and unlikely complication of spinal headache          Plan Factors-    Induction-     Postoperative Plan-     Informed Consent- Anesthetic plan and risks discussed with patient  I personally reviewed this patient with the CRNA  Discussed and agreed on the Anesthesia Plan with the CRNA  Ermelinda Manuel

## 2021-09-23 NOTE — PLAN OF CARE
Problem: PAIN - ADULT  Goal: Verbalizes/displays adequate comfort level or baseline comfort level  Description: Interventions:  - Encourage patient to monitor pain and request assistance  - Assess pain using appropriate pain scale  - Administer analgesics based on type and severity of pain and evaluate response  - Implement non-pharmacological measures as appropriate and evaluate response  - Consider cultural and social influences on pain and pain management  - Notify physician/advanced practitioner if interventions unsuccessful or patient reports new pain  Outcome: Progressing     Problem: INFECTION - ADULT  Goal: Absence or prevention of progression during hospitalization  Description: INTERVENTIONS:  - Assess and monitor for signs and symptoms of infection  - Monitor lab/diagnostic results  - Monitor all insertion sites, i e  indwelling lines, tubes, and drains  - Monitor endotracheal if appropriate and nasal secretions for changes in amount and color  - [place] appropriate cooling/warming therapies per order  - Administer medications as ordered  - Instruct and encourage patient and family to use good hand hygiene technique  - Identify and instruct in appropriate isolation precautions for identified infection/condition  Outcome: Progressing  Goal: Absence of fever/infection during neutropenic period  Description: INTERVENTIONS:  - Monitor WBC    Outcome: Progressing     Problem: SAFETY ADULT  Goal: Patient will remain free of falls  Description: INTERVENTIONS:  - Educate patient/family on patient safety including physical limitations  - Instruct patient to call for assistance with activity   - Consult OT/PT to assist with strengthening/mobility   - Keep Call bell within reach  - Keep bed low and locked with side rails adjusted as appropriate  - Keep care items and personal belongings within reach  - Initiate and maintain comfort rounds  - Make Fall Risk Sign visible to staff  -- Apply yellow socks and bracelet for high fall risk patients  - Consider moving patient to room near nurses station  Outcome: Progressing  Goal: Maintain or return to baseline ADL function  Description: INTERVENTIONS:  -  Assess patient's ability to carry out ADLs; assess patient's baseline for ADL function and identify physical deficits which impact ability to perform ADLs (bathing, care of mouth/teeth, toileting, grooming, dressing, etc )  - Assess/evaluate cause of self-care deficits   - Assess range of motion  - Assess patient's mobility; develop plan if impaired  - Assess patient's need for assistive devices and provide as appropriate  - Encourage maximum independence but intervene and supervise when necessary  - Involve family in performance of ADLs  - Assess for home care needs following discharge   - Consider OT consult to assist with ADL evaluation and planning for discharge  - Provide patient education as appropriate  Outcome: Progressing  Goal: Maintains/Returns to pre admission functional level  Description: INTERVENTIONS:  - Perform BMAT or MOVE assessment daily    - Set and communicate daily mobility goal to care team and patient/family/caregiver  - Collaborate with rehabilitation services on mobility goals if consulted  - - Out of bed for toileting  - Record patient progress and toleration of activity level   Outcome: Progressing     Problem: Knowledge Deficit  Goal: Patient/family/caregiver demonstrates understanding of disease process, treatment plan, medications, and discharge instructions  Description: Complete learning assessment and assess knowledge base  Interventions:  - Provide teaching at level of understanding  - Provide teaching via preferred learning methods  Outcome: Progressing  Goal: Verbalizes understanding of labor plan  Description: Assess patient/family/caregiver's baseline knowledge level and ability to understand information    Provide education via patient/family/caregiver's preferred learning method at appropriate level of understanding  1  Provide teaching at level of understanding  2  Provide teaching via preferred learning method(s)  Outcome: Progressing     Problem: DISCHARGE PLANNING  Goal: Discharge to home or other facility with appropriate resources  Description: INTERVENTIONS:  - Identify barriers to discharge w/patient and caregiver  - Arrange for needed discharge resources and transportation as appropriate  - Identify discharge learning needs (meds, wound care, etc )  - Arrange for interpretive services to assist at discharge as needed  - Refer to Case Management Department for coordinating discharge planning if the patient needs post-hospital services based on physician/advanced practitioner order or complex needs related to functional status, cognitive ability, or social support system  Outcome: Progressing     Problem: Labor & Delivery  Goal: Manages discomfort  Description: Assess and monitor for signs and symptoms of discomfort  Assess patient's pain level regularly and per hospital policy  Administer medications as ordered  Support use of nonpharmacological methods to help control pain such as distraction, imagery, relaxation, and application of heat and cold  Collaborate with interdisciplinary team and patient to determine appropriate pain management plan  1  Include patient in decisions related to comfort  2  Offer non-pharmacological pain management interventions  3  Report ineffective pain management to physician  Outcome: Progressing  Goal: Patient vital signs are stable  Description: 1  Assess vital signs - vaginal delivery    Outcome: Progressing     Problem: BIRTH - VAGINAL/ SECTION  Goal: Fetal and maternal status remain reassuring during the birth process  Description: INTERVENTIONS:  - Monitor vital signs  - Monitor fetal heart rate  - Monitor uterine activity  - Monitor labor progression (vaginal delivery)  - DVT prophylaxis  - Antibiotic prophylaxis  Outcome: Progressing  Goal: Emotionally satisfying birthing experience for mother/fetus  Description: Interventions:  - Assess, plan, implement and evaluate the nursing care given to the patient in labor  - Advocate the philosophy that each childbirth experience is a unique experience and support the family's chosen level of involvement and control during the labor process   - Actively participate in both the patient's and family's teaching of the birth process  - Consider cultural, Bahai and age-specific factors and plan care for the patient in labor  Outcome: Progressing

## 2021-09-23 NOTE — OB LABOR/OXYTOCIN SAFETY PROGRESS
Oxytocin Safety Progress Check Note - Smitha Darling 36 y o  female MRN: [id number]    Unit/Bed#: -01 Encounter: [id number]    Dose (blake-units/min) Oxytocin: 8 blake-units/min  Contraction Frequency (minutes): 3-4  Contraction Quality: Moderate, Strong  Tachysystole: No   Cervical Dilation: 5        Cervical Effacement: 80  Fetal Station: -1  Baseline Rate: 125 bpm  Fetal Heart Rate: 125 BPM  FHR Category: Category I  Oxytocin Safety Progress Check: Safety check completed        Vital Signs:   Vitals:    09/23/21 1515   BP: 116/73   Pulse:    Resp:    Temp:    SpO2:      Notes/comments:  Pt is comfortable in bed with epidural  SVE as above and unchanged from prior  FHT category 1, with contractions q4-5mins  Continue titrating pitocin--increased from 6 to 8  Will recheck in 2h or as clinically indicated      D/w Dr Mike Travis MD 9/23/2021 3:19 PM

## 2021-09-23 NOTE — OB LABOR/OXYTOCIN SAFETY PROGRESS
Oxytocin Safety Progress Check Note - Kerrie Ty 36 y o  female MRN: [id number]    Unit/Bed#: -01 Encounter: [id number]    Dose (blake-units/min) Oxytocin: 12 blake-units/min  Contraction Frequency (minutes): 3-4  Contraction Quality: Moderate, Strong  Tachysystole: No   Cervical Dilation: 5        Cervical Effacement: 80  Fetal Station: -1  Baseline Rate: 125 bpm  Fetal Heart Rate: 140 BPM  FHR Category: Category I  Oxytocin Safety Progress Check: Safety check completed            Vital Signs:   Vitals:    09/23/21 1615   BP: 112/64   Pulse:    Resp:    Temp: 98 9 °F (37 2 °C)   SpO2:            Notes/comments: Patient having intense pressure  SVE unchanged  FHT category 1  Will call anesthesia for help with pain mgmt          Russell Cortez MD 9/23/2021 4:30 PM

## 2021-09-23 NOTE — OB LABOR/OXYTOCIN SAFETY PROGRESS
Oxytocin Safety Progress Check Note - Daljit Hurd 36 y o  female MRN: [id number]    Unit/Bed#: -01 Encounter: [id number]    Dose (blake-units/min) Oxytocin: 12 blake-units/min  Contraction Frequency (minutes): 3-4  Contraction Quality: Mild  Tachysystole: No   Cervical Dilation: 1        Cervical Effacement: 60  Fetal Station: -3  Baseline Rate: 130 bpm  Fetal Heart Rate: 152 BPM  FHR Category: Category I               Vital Signs:   Vitals:    09/22/21 2018   BP: 121/76   Pulse: 76   Resp:    Temp:            Notes/comments:    SVE 1/60/-3  FHT category 1  Pit at 10mU/min in room, toco q2-6m  Continue pitocin to attain consistent contraction pattern  Plan for epidural for pain management with FB placement to follow      Hank Archer MD 9/22/2021 8:40 PM

## 2021-09-23 NOTE — OB LABOR/OXYTOCIN SAFETY PROGRESS
Oxytocin Safety Progress Check Note - Hannah Meza 36 y o  female MRN: [id number]    Unit/Bed#: -01 Encounter: [id number]    Dose (blake-units/min) Oxytocin: 0 blake-units/min  Contraction Frequency (minutes): 3-4  Contraction Quality: Moderate, Strong  Tachysystole: No   Cervical Dilation: 9        Cervical Effacement: 90  Fetal Station: 1  Baseline Rate: 125 bpm  Fetal Heart Rate: 120 BPM  FHR Category: Category I  Oxytocin Safety Progress Check: Safety check completed            Vital Signs:   Vitals:    09/23/21 1815   BP: 119/61   Pulse:    Resp:    Temp:    SpO2:            Notes/comments:    Early decels appreciated on FHT  Pitocin held  SVE 9/90/+1  Patient comfortable with epidural  [place] q2-3m  Continue with expectant management  Dr Issa Zuniga present      Shabana Gamboa MD 9/23/2021 6:19 PM

## 2021-09-23 NOTE — OB LABOR/OXYTOCIN SAFETY PROGRESS
Oxytocin Safety Progress Check Note - Elizabeth Contreras 36 y o  female MRN: [id number]    Unit/Bed#: -01 Encounter: [id number]    Dose (blake-units/min) Oxytocin: 2 blake-units/min  Contraction Frequency (minutes): 4  Contraction Quality: Moderate, Strong  Tachysystole: No   Cervical Dilation: 5        Cervical Effacement: 80  Fetal Station: -1  Baseline Rate: 145 bpm  Fetal Heart Rate: 145 BPM  FHR Category: Category I  Oxytocin Safety Progress Check: Safety check completed        Vital Signs:   Vitals:    09/23/21 1300   BP: 118/64   Pulse:    Resp:    Temp: 99 3 °F (37 4 °C)   SpO2:      Notes/comments:   Pt is comfortable in bed with epidural   SVE deferred as pitocin was restarted only 20 minutes ago  FHT category 1 currently, contractions q6 mins  Will recheck 2h from starting pitocin       D/w Dr Rober Park MD 9/23/2021 1:20 PM

## 2021-09-24 PROCEDURE — 88307 TISSUE EXAM BY PATHOLOGIST: CPT | Performed by: PATHOLOGY

## 2021-09-24 PROCEDURE — 59400 OBSTETRICAL CARE: CPT | Performed by: OBSTETRICS & GYNECOLOGY

## 2021-09-24 PROCEDURE — 99024 POSTOP FOLLOW-UP VISIT: CPT | Performed by: STUDENT IN AN ORGANIZED HEALTH CARE EDUCATION/TRAINING PROGRAM

## 2021-09-24 RX ORDER — OXYTOCIN/RINGER'S LACTATE 30/500 ML
62.5 PLASTIC BAG, INJECTION (ML) INTRAVENOUS CONTINUOUS
Status: ACTIVE | OUTPATIENT
Start: 2021-09-24 | End: 2021-09-24

## 2021-09-24 RX ADMIN — ACETAMINOPHEN 650 MG: 325 TABLET ORAL at 06:47

## 2021-09-24 RX ADMIN — DOCUSATE SODIUM 100 MG: 100 CAPSULE, LIQUID FILLED ORAL at 09:11

## 2021-09-24 RX ADMIN — IBUPROFEN 600 MG: 600 TABLET ORAL at 01:09

## 2021-09-24 RX ADMIN — DOCUSATE SODIUM 100 MG: 100 CAPSULE, LIQUID FILLED ORAL at 18:10

## 2021-09-24 RX ADMIN — Medication: at 01:09

## 2021-09-24 RX ADMIN — WITCH HAZEL 1 PAD: 500 SOLUTION RECTAL; TOPICAL at 01:09

## 2021-09-24 RX ADMIN — IBUPROFEN 600 MG: 600 TABLET ORAL at 10:00

## 2021-09-24 RX ADMIN — HYDROCORTISONE 1 APPLICATION: 1 CREAM TOPICAL at 01:09

## 2021-09-24 RX ADMIN — IBUPROFEN 600 MG: 600 TABLET ORAL at 15:57

## 2021-09-24 NOTE — LACTATION NOTE
This note was copied from a baby's chart  CONSULT - LACTATION  Baby Girl Thania Butcher 1 days female MRN: [id number]    [facility] NURSERY Room / Bed: (N)/(N) Encounter: [id number]    Maternal Information     MOTHER:  Angela Galindo  Maternal Age: 36 y o    OB History: # 1 - Date: None, Sex: None, Weight: None, GA: None, Delivery: None, Apgar1: None, Apgar5: None, Living: None, Birth Comments: None    # 2 - Date: None, Sex: None, Weight: None, GA: None, Delivery: None, Apgar1: None, Apgar5: None, Living: None, Birth Comments: None    # 3 - Date: 21, Sex: Female, Weight: 3310 g (7 lb 4 8 oz), GA: 41w1d, Delivery: Vaginal, Spontaneous, Apgar1: 8, Apgar5: 9, Living: Living, Birth Comments: None   Previouse breast reduction surgery? No    Lactation history:   Has patient previously breast fed: No   How long had patient previously breast fed:     Previous breast feeding complications:       Past Surgical History:   Procedure Laterality Date    BREAST SURGERY Left     benign mass age 14     INDUCED       [facility], 10 years ago   Haritha Roles WISDOM TOOTH EXTRACTION          Birth information:  YOB: 2019   Time of birth: 10:47 PM   Sex: female   Delivery type: Vaginal, Spontaneous   Birth Weight: 3310 g (7 lb 4 8 oz)   Percent of Weight Change: 0%     Gestational Age: 40w1d   [unfilled]    Assessment     Breast and nipple assessment: large breast    Barnhart Assessment: normal assessment    Feeding assessment: feeding well, sleepy presently  LATCH:  Latch: Audible Swallowing:    Type of Nipple:    Comfort (Breast/Nipple):    Hold (Positioning):    LATCH Score:         Feeding recommendations:  breast feed on demand     Met with Dianna and provided her with the  Ready Set,Baby Booklet  Discussed Skin to Skin contact and benefits to mom and baby  Talked about the delay of the first bath until baby has adjusted    Feeding on cue and what that means for recognizing infant's hunger  Avoidance of pacifiers for the first month discussed  Talked about exclusive breastfeeding for the first 6 months  Positioning and latch reviewed as well as showing images of other feeding positions  Discussed the properties of a good latch in any position  Reviewed hand/manual expression  Discussed s/s that baby is getting enough milk  Information also given on common concerns and what to expect the first few weeks after delivery, preparing for other caregivers, and how partners can help  Resources for support also provided  Baby is presently sleepy, baby placed skin to skin, encouraged mom to call for a latch assessment when baby ready to feed  All her questions were answered, she has no additional questions at this time      Bee Dan RN 9/24/2021 12:50 PM

## 2021-09-24 NOTE — PROGRESS NOTES
Progress Note - OB/GYN   Darrin Moran 36 y o  female MRN: [id number]  Unit/Bed#: -01 Encounter: [id number]    Assessment:  Post partum Day #1 s/p , stable, baby in pt's room  Blood pressures: 109-118/61-67 mmHg overnight    Plan:  #1   at 41w 2d  - Pt doing well and remaining stable    #2  Continue routine post partum care  - Encourage ambulation  - Encourage breastfeeding  - Anticipate discharge on PPD #2       Subjective:   Post delivery  Patient is doing well  Lochia WNL  Pain well controlled  Pain: yes, cramping, improved with meds  Tolerating PO: yes  Voiding: yes  Flatus: yes  BM: no  Ambulating: yes  Breastfeeding: yes  Chest pain: no  Shortness of breath: no  Leg pain: no  Lochia: WNL  Contraception: Pt declines at this time  Objective:     Vitals:   Vitals:    21 0020 21 0035 21 0050 21 0105   BP: 107/58 111/61 118/67 109/62   BP Location:  Right arm  Right arm   Pulse: 97 94 92 88   Resp:  16  16   Temp:   99 3 °F (37 4 °C)    TempSrc:   Oral    SpO2:       Weight:       Height:             Intake/Output Summary (Last 24 hours) at 2021 0806  Last data filed at 2021 0545  Gross per 24 hour   Intake 8266 08 ml   Output 3407 ml   Net 4859 08 ml       Lab Results   Component Value Date    WBC 11 61 (H) 2021    HGB 12 5 2021    HCT 36 4 2021    MCV 88 2021     2021       Physical Exam:     Gen: NAD  CV: RRR, no murmurs  Lungs: CTA b/l, no crackles, wheezes or rales  Abd: Soft, non-tender, non-distended, no rebound or guarding  Uterine fundus firm and non-tender, 2 cm below the umbilicus     Ext: Non tender  Neuro: A&O x3          Richard Mantilla MD  EM PGY-1  2021  8:06 AM

## 2021-09-24 NOTE — PLAN OF CARE
Problem: PAIN - ADULT  Goal: Verbalizes/displays adequate comfort level or baseline comfort level  Description: Interventions:  - Encourage patient to monitor pain and request assistance  - Assess pain using appropriate pain scale  - Administer analgesics based on type and severity of pain and evaluate response  - Implement non-pharmacological measures as appropriate and evaluate response  - Consider cultural and social influences on pain and pain management  - Notify physician/advanced practitioner if interventions unsuccessful or patient reports new pain  Outcome: Progressing     Problem: INFECTION - ADULT  Goal: Absence or prevention of progression during hospitalization  Description: INTERVENTIONS:  - Assess and monitor for signs and symptoms of infection  - Monitor lab/diagnostic results  - Monitor all insertion sites, i e  indwelling lines, tubes, and drains  - Monitor endotracheal if appropriate and nasal secretions for changes in amount and color  - Simpson appropriate cooling/warming therapies per order  - Administer medications as ordered  - Instruct and encourage patient and family to use good hand hygiene technique  - Identify and instruct in appropriate isolation precautions for identified infection/condition  Outcome: Progressing  Goal: Absence of fever/infection during neutropenic period  Description: INTERVENTIONS:  - Monitor WBC    Outcome: Progressing     Problem: SAFETY ADULT  Goal: Patient will remain free of falls  Description: INTERVENTIONS:  - Educate patient/family on patient safety including physical limitations  - Instruct patient to call for assistance with activity   - Consult OT/PT to assist with strengthening/mobility   - Keep Call bell within reach  - Keep bed low and locked with side rails adjusted as appropriate  - Keep care items and personal belongings within reach  - Initiate and maintain comfort rounds  - Make Fall Risk Sign visible to staff  - Offer Toileting every   Hours, in advance of need  - Apply yellow socks and bracelet for high fall risk patients  - Consider moving patient to room near nurses station  Outcome: Progressing  Goal: Maintain or return to baseline ADL function  Description: INTERVENTIONS:  -  Assess patient's ability to carry out ADLs; assess patient's baseline for ADL function and identify physical deficits which impact ability to perform ADLs (bathing, care of mouth/teeth, toileting, grooming, dressing, etc )  - Assess/evaluate cause of self-care deficits   - Assess range of motion  - Assess patient's mobility; develop plan if impaired  - Assess patient's need for assistive devices and provide as appropriate  - Encourage maximum independence but intervene and supervise when necessary  - Involve family in performance of ADLs  - Assess for home care needs following discharge   - Consider OT consult to assist with ADL evaluation and planning for discharge  - Provide patient education as appropriate  Outcome: Progressing  Goal: Maintains/Returns to pre admission functional level  Description: INTERVENTIONS:  - Perform BMAT or MOVE assessment daily    - Set and communicate daily mobility goal to care team and patient/family/caregiver     - Collaborate with rehabilitation services on mobility goals if consulted  - Out of bed for toileting  - Record patient progress and toleration of activity level   Outcome: Progressing     Problem: DISCHARGE PLANNING  Goal: Discharge to home or other facility with appropriate resources  Description: INTERVENTIONS:  - Identify barriers to discharge w/patient and caregiver  - Arrange for needed discharge resources and transportation as appropriate  - Identify discharge learning needs (meds, wound care, etc )  - Arrange for interpretive services to assist at discharge as needed  - Refer to Case Management Department for coordinating discharge planning if the patient needs post-hospital services based on physician/advanced practitioner order or complex needs related to functional status, cognitive ability, or social support system  Outcome: Progressing     Problem: POSTPARTUM  Goal: Experiences normal postpartum course  Description: INTERVENTIONS:  - Monitor maternal vital signs  - Assess uterine involution and lochia  Outcome: Progressing  Goal: Appropriate maternal -  bonding  Description: INTERVENTIONS:  - Identify family support  - Assess for appropriate maternal/infant bonding   -Encourage maternal/infant bonding opportunities  - Referral to  or  as needed  Outcome: Progressing  Goal: Establishment of infant feeding pattern  Description: INTERVENTIONS:  - Assess breast/bottle feeding  - Refer to lactation as needed  Outcome: Progressing  Goal: Incision(s), wounds(s) or drain site(s) healing without S/S of infection  Description: INTERVENTIONS  - Assess and document dressing, incision, wound bed, drain sites and surrounding tissue  - Provide patient and family education  Outcome: Progressing

## 2021-09-24 NOTE — DISCHARGE SUMMARY
Discharge Summary - OB/GYN   Juli Boyle 36 y o  female MRN: [id number]  Unit/Bed#: -01 Encounter: [id number]      Admission Date: 2021     Discharge Date: 21     Admitting Diagnosis:   1  Pregnancy at 41w2d  2  S/p Covid ()    Discharge Diagnosis:   Same, delivered    Procedures: spontaneous vaginal delivery    Delivering Attending: Giselle Mckenzie MD  Discharge Attending: Dr Rosa Evans Course:     Ms Juli Boyle is a 36 y o   at [address] [place] [address]  She presented to labor and delivery for induction  Her pregnancy was complicated by Covid infection in April at [address]  She recovered and has been asymptomatic throughout the rest of her pregnancy  On initial exam she was noted to be 0/30/-3  She was started on cytotec and received two doses, declining Ortiz balloon, and was started on pitocin  After several hours of minimal change, patient was amenable to FB and was AROM during placement  After FB was dislodged, patient made slow and steady change throughout the following day  An IUPC and FSE placed for fetal heart tone surveillance and an amnioinfusion was started for recurrent variable decelerations, resolved  A pitocin break was initiated  After restarting, patient continued to make cervical change and was found to be complete at , and began pushing at   She delivered a viable female  on 21 at 2247  Weight 7lbs 4 8oz via spontaneous vaginal delivery  Apgars were 8 (1 min) and 9 (5 min)   was transferred to  nursery  Patient tolerated the procedure well and was transferred to recovery in stable condition  Her post-partum course was uncomplicated  Her post-partum pain was well controlled with oral analgesics  On day of discharge, she was ambulating and able to reasonably perform all ADLs  She was voiding and had appropriate bowel function  Pain was well controlled  She was discharged home on post-partum day #2 without complications  Patient was instructed to follow up with her OB as an outpatient and was given appropriate warnings to call provider if she develops signs of infection or uncontrolled pain  Complications: none apparent    Condition at discharge: stable     Discharge instructions/Information to patient and family:   See after visit summary for information provided to patient and family  Provisions for Follow-Up Care:  See after visit summary for information related to follow-up care and any pertinent home health orders  Disposition: Home    Planned Readmission: No    Discharge Medications: For a complete list of the patient's medications, please refer to her med rec        Glen Olsen MD  OB/GYN PGY-2  8:48 AM  09/25/21

## 2021-09-24 NOTE — PLAN OF CARE
Problem: PAIN - ADULT  Goal: Verbalizes/displays adequate comfort level or baseline comfort level  Description: Interventions:  - Encourage patient to monitor pain and request assistance  - Assess pain using appropriate pain scale  - Administer analgesics based on type and severity of pain and evaluate response  - Implement non-pharmacological measures as appropriate and evaluate response  - Consider cultural and social influences on pain and pain management  - Notify physician/advanced practitioner if interventions unsuccessful or patient reports new pain  Outcome: Progressing     Problem: INFECTION - ADULT  Goal: Absence or prevention of progression during hospitalization  Description: INTERVENTIONS:  - Assess and monitor for signs and symptoms of infection  - Monitor lab/diagnostic results  - Monitor all insertion sites, i e  indwelling lines, tubes, and drains  - Monitor endotracheal if appropriate and nasal secretions for changes in amount and color  - Whitt appropriate cooling/warming therapies per order  - Administer medications as ordered  - Instruct and encourage patient and family to use good hand hygiene technique  - Identify and instruct in appropriate isolation precautions for identified infection/condition  Outcome: Progressing  Goal: Absence of fever/infection during neutropenic period  Description: INTERVENTIONS:  - Monitor WBC    Outcome: Progressing     Problem: SAFETY ADULT  Goal: Patient will remain free of falls  Description: INTERVENTIONS:  - Educate patient/family on patient safety including physical limitations  - Instruct patient to call for assistance with activity   - Consult OT/PT to assist with strengthening/mobility   - Keep Call bell within reach  - Keep bed low and locked with side rails adjusted as appropriate  - Keep care items and personal belongings within reach  - Initiate and maintain comfort rounds  - Make Fall Risk Sign visible to staff  - Offer Toileting every  Hours, in advance of need  - Initiate/Maintain alarm  - Obtain necessary fall risk management equipment:   - Apply yellow socks and bracelet for high fall risk patients  - Consider moving patient to room near nurses station  Outcome: Progressing  Goal: Maintain or return to baseline ADL function  Description: INTERVENTIONS:  -  Assess patient's ability to carry out ADLs; assess patient's baseline for ADL function and identify physical deficits which impact ability to perform ADLs (bathing, care of mouth/teeth, toileting, grooming, dressing, etc )  - Assess/evaluate cause of self-care deficits   - Assess range of motion  - Assess patient's mobility; develop plan if impaired  - Assess patient's need for assistive devices and provide as appropriate  - Encourage maximum independence but intervene and supervise when necessary  - Involve family in performance of ADLs  - Assess for home care needs following discharge   - Consider OT consult to assist with ADL evaluation and planning for discharge  - Provide patient education as appropriate  Outcome: Progressing  Goal: Maintains/Returns to pre admission functional level  Description: INTERVENTIONS:  - Perform BMAT or MOVE assessment daily    - Set and communicate daily mobility goal to care team and patient/family/caregiver  - Collaborate with rehabilitation services on mobility goals if consulted  - Perform Range of Motion  times a day  - Reposition patient every  hours    - Dangle patient  times a day  - Stand patient  times a day  - Ambulate patient  times a day  - Out of bed to chair  times a day   - Out of bed for meals  times a day  - Out of bed for toileting  - Record patient progress and toleration of activity level   Outcome: Progressing     Problem: DISCHARGE PLANNING  Goal: Discharge to home or other facility with appropriate resources  Description: INTERVENTIONS:  - Identify barriers to discharge w/patient and caregiver  - Arrange for needed discharge resources and transportation as appropriate  - Identify discharge learning needs (meds, wound care, etc )  - Arrange for interpretive services to assist at discharge as needed  - Refer to Case Management Department for coordinating discharge planning if the patient needs post-hospital services based on physician/advanced practitioner order or complex needs related to functional status, cognitive ability, or social support system  Outcome: Progressing     Problem: POSTPARTUM  Goal: Experiences normal postpartum course  Description: INTERVENTIONS:  - Monitor maternal vital signs  - Assess uterine involution and lochia  Outcome: Progressing  Goal: Appropriate maternal -  bonding  Description: INTERVENTIONS:  - Identify family support  - Assess for appropriate maternal/infant bonding   -Encourage maternal/infant bonding opportunities  - Referral to  or  as needed  Outcome: Progressing  Goal: Establishment of infant feeding pattern  Description: INTERVENTIONS:  - Assess breast/bottle feeding  - Refer to lactation as needed  Outcome: Progressing  Goal: Incision(s), wounds(s) or drain site(s) healing without S/S of infection  Description: INTERVENTIONS  - Assess and document dressing, incision, wound bed, drain sites and surrounding tissue  - Provide patient and family education  - Perform skin care/dressing changes every   Outcome: Progressing

## 2021-09-24 NOTE — OB LABOR/OXYTOCIN SAFETY PROGRESS
Oxytocin Safety Progress Check Note - Devang Setting 36 y o  female MRN: [id number]    Unit/Bed#: -01 Encounter: [id number]    Dose (blake-units/min) Oxytocin: 6 blake-units/min  Contraction Frequency (minutes): 2-4  Contraction Quality: Moderate  Tachysystole: No   Cervical Dilation: Lip/rim (Comment)        Cervical Effacement: 90  Fetal Station: 1  Baseline Rate: 150 bpm  Fetal Heart Rate: 140 BPM  FHR Category: Category II  Oxytocin Safety Progress Check: Safety check completed            Vital Signs:   Vitals:    09/23/21 2020   BP: 140/84   Pulse: 78   Resp:    Temp:    SpO2:            Notes/comments:    SVE unchanged  Patient uncomfortable with rectal pressure  FHT cat 1  Pit at 6mU/min, toco q2-3m  Plan for delivery      Rich Hester MD 9/23/2021 8:43 PM

## 2021-09-24 NOTE — L&D DELIVERY NOTE
Vaginal Delivery Summary - OB/GYN   Sigrid Monson 36 y o  female MRN: [id number]  Unit/Bed#: -01 Encounter: [id number]      Pre-delivery Diagnosis:   1  Pregnancy at 40w6d   2  S/p COVID ()    Post-delivery Diagnosis: same, delivered    Procedure: Spontaneous Vaginal Delivery    Attending: Dr Ai Alva    Assistant(s): Dr Heriberto Gaxiola    Anesthesia: Epidural    QBL: 713CV    Complications: none apparent    Specimens:   1  Arterial and venous cord gases  2  Cord blood  3  Segment of umbilical cord  4  Placenta to pathology for meconium    Findings:  1  Viable female on 21 at 2247, with APGARS of 8 and 9 at 1 and 5 minutes respectively,  2  Spontaneous delivery of intact placenta at 2251  3  Left sulcal laceration repaired with 3-0 Vicryl rapide  4  Blood gases:   Arterial pH: 7 204   Arterial base excess: -6 4   Venous pH: 7 382   Venous base excess: -5 3    Disposition:  Patient tolerated the procedure well and was recovering in labor and delivery room       Brief history and labor course:  Ms Sigrid Monson is a 36 y o   at 40w6d  She presented to labor and delivery for induction  Her pregnancy was complicated by Covid infection in April at [address]  She recovered and has been asymptomatic throughout the rest of her pregnancy  On initial exam she was noted to be 0/30/-3  She was started on cytotec and received two doses, declining Ortiz balloon, and was started on pitocin  After several hours of minimal change, patient was amenable to FB and was AROM during placement  After FB was dislodged, patient made slow and steady change throughout the following day  An IUPC and FSE placed for fetal heart tone surveillance and an amnioinfusion was started for recurrent variable decelerations, resolved  A pitocin break was initiated  After restarting, patient continued to make cervical change and was found to be complete at , and began pushing at       Description of procedure:  After pushing for 91 minutes, at 2247 patient delivered a viable female , wt pending, apgars of 8 (1 min) and 9 (5 min)  The fetal vertex delivered spontaneously  There was a single, loose nuchal cord around the fetal neck which was manually displaced during delivery  The right anterior shoulder and right arm delivered atraumatically with maternal expulsive forces and the assistance of gentle downward traction  The left posterior shoulder delivered with maternal expulsive forces and the assistance of gentle upward traction  The remainder of the fetus delivered spontaneously  Upon delivery, the infant was placed on the mothers abdomen and the cord was doubly clamped and cut after >30 seconds  The infant was noted to cry spontaneously and was moving all extremities appropriately  There was no evidence for injury  Awaiting nurse resuscitators evaluated the   Arterial and venous cord blood gases and cord blood was collected for analysis  These were promptly sent to the lab  In the immediate post-partum, 30 units of IV pitocin was administered, and the uterus was noted to contract down well with massage and pitocin  The placenta delivered spontaneously at 2251 and was noted to have a centrally inserted 3 vessel cord  The vagina, cervix, perineum, and rectum were inspected and there was noted to be a left sulcal tear which was repaired in a running locked fashion with 3-0 Vicryl Rapide  Hemostasis was appreciated immediately after the repair process  At the conclusion of the procedure, all needle, sponge, and instrument counts were noted to be correct  Patient tolerated the procedure well and was allowed to recover in labor and delivery room with family and  before being transferred to the post-partum floor  Dr Fei Sharif was present and participated in all key portions of the case        Greg Henley MD  OB/GYN PGY-1  2021  12:27 AM

## 2021-09-24 NOTE — UTILIZATION REVIEW
Inpatient Admission Authorization Request   Notification of Maternity/Delivery &  Birth Information for Admission   SERVICING FACILITY:   [address]  Tax ID: [id number]  NPI: [id number]  Place of Service: [facility]patient [facility]  [facility]  Place of Service Code: 24     ATTENDING PROVIDER:  Attending Name and NPI#: Lencho Cancino Md [[id number]]  Address: [address]  Phone: [phone]     UTILIZATION REVIEW CONTACT:  Jeanna Soto Utilization   Network Utilization Review Department  Phone: [phone]  Fax [phone]  Email: [email or web address]     PHYSICIAN ADVISORY SERVICES:  FOR [id number] REVIEW - MEDICAL NECESSITY DENIAL  Phone: [phone]  Fax: [phone]  Email: [email or web address]     TYPE OF REQUEST:  Inpatient Status     ADMISSION INFORMATION:  ADMISSION DATE/TIME: 21  7:54 PM  PATIENT DIAGNOSIS CODE/DESCRIPTION:  40 weeks gestation of pregnancy [Z3A 40]  Single liveborn infant, delivered vaginally [Z38 00] The encounter diagnosis was 40 weeks gestation of pregnancy  1  40 weeks gestation of pregnancy      DISCHARGE DATE/TIME: No discharge date for patient encounter  DISCHARGE DISPOSITION (IF DISCHARGED): Home/Self Care     MOTHER AND  INFORMATION:  Mother: Tasha Burt 1990   Delivering clinician: Walter Dang        3    Para   1    Term   1       0    AB   2    Living   1       SAB   1    TAB   0    Ectopic   0    Multiple   0    Live Births   1               Sharon Name & MRN:   Information for the patient's :  [facility] Marii Artist) [[id number]]      Delivery Information:  Sex: female  Delivered 2021 10:47 PM by Vaginal, Spontaneous; Gestational Age: 40w1d    Sharon Measurements:  Weight: 7 lb 4 8 oz (3310 g);   Height: 20 5"    APGAR 1 minute 5 minutes 10 minutes   Totals: 8 9       Birth Information: 36 y o  female MRN: [id number] Unit/Bed#: -01 Estimated Date of Delivery: 9/15/21  Birthweight: No birth weight on file  Gestational Age: <None> Delivery Type: Vaginal, Spontaneous          APGARS  One minute Five minutes Ten minutes   Totals:               IMPORTANT INFORMATION:  Please contact the [facility] directly with any questions or concerns regarding this request  Department voicemails are confidential     Send requests for admission clinical reviews, concurrent reviews, approvals, and administrative denials due to lack of clinical to fax [phone]

## 2021-09-25 VITALS
WEIGHT: 222 LBS | HEIGHT: 69 IN | DIASTOLIC BLOOD PRESSURE: 62 MMHG | OXYGEN SATURATION: 98 % | TEMPERATURE: 98.5 F | HEART RATE: 60 BPM | SYSTOLIC BLOOD PRESSURE: 113 MMHG | BODY MASS INDEX: 32.88 KG/M2 | RESPIRATION RATE: 18 BRPM

## 2021-09-25 PROBLEM — Z3A.40 40 WEEKS GESTATION OF PREGNANCY: Status: RESOLVED | Noted: 2021-09-21 | Resolved: 2021-09-25

## 2021-09-25 PROCEDURE — 99024 POSTOP FOLLOW-UP VISIT: CPT | Performed by: OBSTETRICS & GYNECOLOGY

## 2021-09-25 RX ORDER — IBUPROFEN 600 MG/1
600 TABLET ORAL EVERY 6 HOURS PRN
Qty: 30 TABLET | Refills: 0 | Status: SHIPPED | OUTPATIENT
Start: 2021-09-25 | End: 2022-01-05 | Stop reason: ALTCHOICE

## 2021-09-25 RX ORDER — ACETAMINOPHEN 325 MG/1
650 TABLET ORAL EVERY 6 HOURS PRN
Refills: 0
Start: 2021-09-25 | End: 2022-01-05 | Stop reason: ALTCHOICE

## 2021-09-25 RX ORDER — DOCUSATE SODIUM 100 MG/1
100 CAPSULE, LIQUID FILLED ORAL 2 TIMES DAILY
Refills: 0
Start: 2021-09-25 | End: 2022-01-05 | Stop reason: ALTCHOICE

## 2021-09-25 RX ORDER — DIAPER,BRIEF,INFANT-TODD,DISP
1 EACH MISCELLANEOUS 4 TIMES DAILY PRN
Qty: 30 G | Refills: 0
Start: 2021-09-25 | End: 2022-01-05 | Stop reason: ALTCHOICE

## 2021-09-25 RX ADMIN — IBUPROFEN 600 MG: 600 TABLET ORAL at 04:07

## 2021-09-25 RX ADMIN — DOCUSATE SODIUM 100 MG: 100 CAPSULE, LIQUID FILLED ORAL at 08:22

## 2021-09-25 NOTE — DISCHARGE INSTRUCTIONS
Vaginal Delivery   WHAT YOU SHOULD KNOW:   A vaginal delivery is the birth of your baby through your vagina (birth canal)  AFTER YOU LEAVE:   Medicines:  · NSAIDs  help decrease swelling and pain or fever  This medicine is available with or without a doctor's order  NSAIDs can cause stomach bleeding or kidney problems in certain people  If you take blood thinner medicine, always ask your healthcare provider if NSAIDs are safe for you  Always read the medicine label and follow directions  · Take your medicine as directed  Call your healthcare provider if you think your medicine is not helping or if you have side effects  Tell him if you are allergic to any medicine  Keep a list of the medicines, vitamins, and herbs you take  Include the amounts, and when and why you take them  Bring the list or the pill bottles to follow-up visits  Carry your medicine list with you in case of an emergency  Follow up with your primary healthcare provider:  Most women need to return 6 weeks after a vaginal delivery  Ask about how to care for your wounds or stitches  Write down your questions so you remember to ask them during your visits  Activity:  Rest as much as possible  Try to keep all activities short  You may be able to do some exercise soon after you have your baby  Talk with your primary healthcare provider before you start exercising  If you work outside the home, ask when you can return to your job  Kegel exercises:  Kegel exercises may help your vaginal and rectal muscles heal faster  You can do Kegel exercises by tightening and relaxing the muscles around your vagina  Kegel exercises help make the muscles stronger  Breast care:  When your milk comes in, your breasts may feel full and hard  Ask how to care for your breasts, even if you are not breastfeeding  Constipation:  Do not try to push the bowel movement out if it is too hard   High-fiber foods, extra liquids, and regular exercise can help you prevent constipation  Examples of high-fiber foods are fruit and bran  Prune juice and water are good liquids to drink  Regular exercise helps your digestive system work  You may also be told to take over-the-counter fiber and stool softener medicines  Take these items as directed  Hemorrhoids:  Pregnancy can cause severe hemorrhoids  You may have rectal pain because of the hemorrhoids  Ask how to prevent or treat hemorrhoids  Perineum care: Your perineum is the area between your vagina and anus  Keep the area clean and dry to help it heal and to prevent infection  Wash the area gently with soap and water when you bathe or shower  Rinse your perineum with warm water when you use the toilet  Your primary healthcare provider may suggest you use a warm sitz bath to help decrease pain  A sitz bath is a bathtub or basin filled to hip level  Stay in the sitz bath for 20 to 30 minutes, or as directed  Vaginal discharge: You will have vaginal discharge, called lochia, after your delivery  The lochia is bright red the first day or two after the birth  By the fourth day, the amount decreases, and it turns red-brown  Use a sanitary pad rather than a tampon to prevent a vaginal infection  It is normal to have lochia up to 8 weeks after your baby is born  Monthly periods: Your period may start again within 7 to 12 weeks after your baby is born  If you are breastfeeding, it may take longer for your period to start again  You can still get pregnant again even though you do not have your monthly period  Talk with your primary healthcare provider about a birth control method that will be good for you if you do not want to get pregnant  Mood changes: Many new mothers have some kind of mood changes after delivery  Some of these changes occur because of lack of sleep, hormone changes, and caring for a new baby  Some mood changes can be more serious, such as postpartum depression   Talk with your primary healthcare provider if you feel unable to care for yourself or your baby  Sexual activity:  You may need to avoid sex for 6 to 7 weeks after you have your baby  You may notice you have a decreased desire for sex, or sex may be painful  You may need to use a vaginal lubricant (gel) to help make sex more comfortable  Contact your primary healthcare provider if:   · You have heavy vaginal bleeding that fills 1 or more sanitary pads in 1 hour  · You have a fever  · Your pain does not go away, or gets worse  · The skin between your vagina and rectum is swollen, warm, or red  · You have swollen, hard, or painful breasts  · You feel very sad or depressed  · You feel more tired than usual      · You have questions or concerns about your condition or care  Seek care immediately or call 911 if:   · You have pus or yellow drainage coming from your vagina or wound  · You are urinating very little, or not at all  · Your arm or leg feels warm, tender, and painful  It may look swollen and red  · You feel lightheaded, have sudden and worsening chest pain, or trouble breathing  You may have more pain when you take deep breaths or cough, or you may cough up blood  © 2014 [address] is for End User's use only and may not be sold, redistributed or otherwise used for commercial purposes  All illustrations and images included in CareNotes® are the copyrighted property of [employer]  or Felice Boykin  The above information is an  only  It is not intended as medical advice for individual conditions or treatments  Talk to your doctor, nurse or pharmacist before following any medical regimen to see if it is safe and effective for you

## 2021-09-25 NOTE — PROGRESS NOTES
Progress Note - OB/GYN   Brycelaila Ludwig 36 y o  female MRN: [id number]  Unit/Bed#:  310-01 Encounter: [id number]    Assessment:  Post partum Day #2 s/p , stable, baby in room    Plan:  #1  Continue routine post partum care  - Encourage ambulation  - Encourage breastfeeding  - Anticipate discharge today, PPD #2       Subjective:   Post delivery  Patient is doing well  Lochia WNL  Pain well controlled       Pain: yes, cramping, improved with meds  Tolerating PO: yes  Voiding: yes  Flatus: yes  BM: no  Ambulating: yes  Breastfeeding: yes  Chest pain: no  Shortness of breath: no  Leg pain: no  Lochia: WNL      Objective:     Vitals:   Vitals:    21 1649 21 0000 21   BP: 112/59 119/66 105/64 113/62   BP Location: Left arm  Right arm Right arm   Pulse: 63 71 66 60   Resp: 18 18 18 18   Temp: 98 °F (36 7 °C) 97 9 °F (36 6 °C) 98 4 °F (36 9 °C) 98 5 °F (36 9 °C)   TempSrc: Oral Oral Oral Oral   SpO2: 98%      Weight:       Height:           No intake or output data in the 24 hours ending 21 0847    Lab Results   Component Value Date    WBC 11 61 (H) 2021    HGB 12 5 2021    HCT 36 4 2021    MCV 88 2021     2021       Physical Exam:   Patient in bathroom on AM rounds          Kirt Stoddard MD  OB/GYN PGY-2  2021  8:45 AM

## 2021-09-25 NOTE — LACTATION NOTE
This note was copied from a baby's chart  Met with Dianna this morning, who states baby is latching well on the left side  She was able to latch baby on without assistance  She states that baby becomes fussy and is refusing her right breast  Instructed her to attempt baby on the right for a few minutes and if baby doesn't latch to feed baby on the left side and pump the right side  Mom is agreeable with this plan  Went over the discharge breastfeeding booklet with her, including the feeding log  Emphasized 8 or more (12) feedings in a 24 hour period, what to expect for the number of diapers per day of life and the progression of properties of the  stooling pattern  Discussed s/s engorgement, blocked milk ducts, and mastitis  Discussed how to remedy at home and when to contact physician  Reviewed breastfeeding and your lifestyle, storage and preparation of breast milk, how to keep you breast pump clean, the employed breastfeeding mother and paced bottle feeding handouts  Booklet included Breastfeeding Resources for after discharge including access to the number for the [address]  Encouraged Dianna to utilize the [facility] and [employer] as needed

## 2021-09-25 NOTE — PLAN OF CARE
Problem: PAIN - ADULT  Goal: Verbalizes/displays adequate comfort level or baseline comfort level  Description: Interventions:  - Encourage patient to monitor pain and request assistance  - Assess pain using appropriate pain scale  - Administer analgesics based on type and severity of pain and evaluate response  - Implement non-pharmacological measures as appropriate and evaluate response  - Consider cultural and social influences on pain and pain management  - Notify physician/advanced practitioner if interventions unsuccessful or patient reports new pain  Outcome: Adequate for Discharge     Problem: INFECTION - ADULT  Goal: Absence or prevention of progression during hospitalization  Description: INTERVENTIONS:  - Assess and monitor for signs and symptoms of infection  - Monitor lab/diagnostic results  - Monitor all insertion sites, i e  indwelling lines, tubes, and drains  - Monitor endotracheal if appropriate and nasal secretions for changes in amount and color  - [place] appropriate cooling/warming therapies per order  - Administer medications as ordered  - Instruct and encourage patient and family to use good hand hygiene technique  - Identify and instruct in appropriate isolation precautions for identified infection/condition  Outcome: Adequate for Discharge     Problem: SAFETY ADULT  Goal: Patient will remain free of falls  Description: INTERVENTIONS:  - Educate patient/family on patient safety including physical limitations  - Instruct patient to call for assistance with activity   - Consult OT/PT to assist with strengthening/mobility   - Keep Call bell within reach  - Keep bed low and locked with side rails adjusted as appropriate  - Keep care items and personal belongings within reach  - Initiate and maintain comfort rounds  - Make Fall Risk Sign visible to staff  - Apply yellow socks and bracelet for high fall risk patients  - Consider moving patient to room near nurses station  Outcome: Adequate for Discharge  Goal: Maintain or return to baseline ADL function  Description: INTERVENTIONS:  -  Assess patient's ability to carry out ADLs; assess patient's baseline for ADL function and identify physical deficits which impact ability to perform ADLs (bathing, care of mouth/teeth, toileting, grooming, dressing, etc )  - Assess/evaluate cause of self-care deficits   - Assess range of motion  - Assess patient's mobility; develop plan if impaired  - Assess patient's need for assistive devices and provide as appropriate  - Encourage maximum independence but intervene and supervise when necessary  - Involve family in performance of ADLs  - Assess for home care needs following discharge   - Consider OT consult to assist with ADL evaluation and planning for discharge  - Provide patient education as appropriate  Outcome: Adequate for Discharge  Goal: Maintains/Returns to pre admission functional level  Description: INTERVENTIONS:  - Perform BMAT or MOVE assessment daily    - Set and communicate daily mobility goal to care team and patient/family/caregiver     - Collaborate with rehabilitation services on mobility goals if consulted  - Out of bed for toileting  - Record patient progress and toleration of activity level   Outcome: Adequate for Discharge     Problem: DISCHARGE PLANNING  Goal: Discharge to home or other facility with appropriate resources  Description: INTERVENTIONS:  - Identify barriers to discharge w/patient and caregiver  - Arrange for needed discharge resources and transportation as appropriate  - Identify discharge learning needs (meds, wound care, etc )  - Arrange for interpretive services to assist at discharge as needed  - Refer to Case Management Department for coordinating discharge planning if the patient needs post-hospital services based on physician/advanced practitioner order or complex needs related to functional status, cognitive ability, or social support system  Outcome: Adequate for Discharge Problem: POSTPARTUM  Goal: Experiences normal postpartum course  Description: INTERVENTIONS:  - Monitor maternal vital signs  - Assess uterine involution and lochia  Outcome: Adequate for Discharge  Goal: Appropriate maternal -  bonding  Description: INTERVENTIONS:  - Identify family support  - Assess for appropriate maternal/infant bonding   -Encourage maternal/infant bonding opportunities  - Referral to  or  as needed  Outcome: Adequate for Discharge  Goal: Establishment of infant feeding pattern  Description: INTERVENTIONS:  - Assess breast/bottle feeding  - Refer to lactation as needed  Outcome: Adequate for Discharge  Goal: Incision(s), wounds(s) or drain site(s) healing without S/S of infection  Description: INTERVENTIONS  - Assess and document dressing, incision, wound bed, drain sites and surrounding tissue  - Provide patient and family education  Outcome: Adequate for Discharge

## 2021-09-25 NOTE — PLAN OF CARE
Problem: PAIN - ADULT  Goal: Verbalizes/displays adequate comfort level or baseline comfort level  Description: Interventions:  - Encourage patient to monitor pain and request assistance  - Assess pain using appropriate pain scale  - Administer analgesics based on type and severity of pain and evaluate response  - Implement non-pharmacological measures as appropriate and evaluate response  - Consider cultural and social influences on pain and pain management  - Notify physician/advanced practitioner if interventions unsuccessful or patient reports new pain  Outcome: Progressing     Problem: INFECTION - ADULT  Goal: Absence or prevention of progression during hospitalization  Description: INTERVENTIONS:  - Assess and monitor for signs and symptoms of infection  - Monitor lab/diagnostic results  - Monitor all insertion sites, i e  indwelling lines, tubes, and drains  - Monitor endotracheal if appropriate and nasal secretions for changes in amount and color  - [employer] appropriate cooling/warming therapies per order  - Administer medications as ordered  - Instruct and encourage patient and family to use good hand hygiene technique  - Identify and instruct in appropriate isolation precautions for identified infection/condition  Outcome: Progressing     Problem: SAFETY ADULT  Goal: Patient will remain free of falls  Description: INTERVENTIONS:  - Educate patient/family on patient safety including physical limitations  - Instruct patient to call for assistance with activity   - Consult OT/PT to assist with strengthening/mobility   - Keep Call bell within reach  - Keep bed low and locked with side rails adjusted as appropriate  - Keep care items and personal belongings within reach  - Initiate and maintain comfort rounds  - Make Fall Risk Sign visible to staff  - Apply yellow socks and bracelet for high fall risk patients  - Consider moving patient to room near nurses station  Outcome: Progressing  Goal: Maintain or return to baseline ADL function  Description: INTERVENTIONS:  -  Assess patient's ability to carry out ADLs; assess patient's baseline for ADL function and identify physical deficits which impact ability to perform ADLs (bathing, care of mouth/teeth, toileting, grooming, dressing, etc )  - Assess/evaluate cause of self-care deficits   - Assess range of motion  - Assess patient's mobility; develop plan if impaired  - Assess patient's need for assistive devices and provide as appropriate  - Encourage maximum independence but intervene and supervise when necessary  - Involve family in performance of ADLs  - Assess for home care needs following discharge   - Consider OT consult to assist with ADL evaluation and planning for discharge  - Provide patient education as appropriate  Outcome: Progressing  Goal: Maintains/Returns to pre admission functional level  Description: INTERVENTIONS:  - Perform BMAT or MOVE assessment daily    - Set and communicate daily mobility goal to care team and patient/family/caregiver     - Collaborate with rehabilitation services on mobility goals if consulted  - Out of bed for toileting  - Record patient progress and toleration of activity level   Outcome: Progressing     Problem: DISCHARGE PLANNING  Goal: Discharge to home or other facility with appropriate resources  Description: INTERVENTIONS:  - Identify barriers to discharge w/patient and caregiver  - Arrange for needed discharge resources and transportation as appropriate  - Identify discharge learning needs (meds, wound care, etc )  - Arrange for interpretive services to assist at discharge as needed  - Refer to Case Management Department for coordinating discharge planning if the patient needs post-hospital services based on physician/advanced practitioner order or complex needs related to functional status, cognitive ability, or social support system  Outcome: Progressing     Problem: POSTPARTUM  Goal: Experiences normal postpartum course  Description: INTERVENTIONS:  - Monitor maternal vital signs  - Assess uterine involution and lochia  Outcome: Progressing  Goal: Appropriate maternal -  bonding  Description: INTERVENTIONS:  - Identify family support  - Assess for appropriate maternal/infant bonding   -Encourage maternal/infant bonding opportunities  - Referral to  or  as needed  Outcome: Progressing  Goal: Establishment of infant feeding pattern  Description: INTERVENTIONS:  - Assess breast/bottle feeding  - Refer to lactation as needed  Outcome: Progressing  Goal: Incision(s), wounds(s) or drain site(s) healing without S/S of infection  Description: INTERVENTIONS  - Assess and document dressing, incision, wound bed, drain sites and surrounding tissue  - Provide patient and family education  Outcome: Progressing     Problem: INFECTION - ADULT  Goal: Absence of fever/infection during neutropenic period  Description: INTERVENTIONS:  - Monitor WBC    Outcome: Completed

## 2021-09-28 ENCOUNTER — TELEPHONE (OUTPATIENT)
Dept: OTHER | Facility: OTHER | Age: 31
End: 2021-09-28

## 2021-09-28 NOTE — TELEPHONE ENCOUNTER
Patient would like to schedule a postpartum appointment with Dr Devang Louis  Please give her a call to discuss

## 2021-09-30 ENCOUNTER — OFFICE VISIT (OUTPATIENT)
Dept: POSTPARTUM | Facility: CLINIC | Age: 31
End: 2021-09-30
Payer: COMMERCIAL

## 2021-09-30 VITALS — DIASTOLIC BLOOD PRESSURE: 84 MMHG | SYSTOLIC BLOOD PRESSURE: 120 MMHG

## 2021-09-30 DIAGNOSIS — Z71.89 ENCOUNTER FOR BREAST FEEDING COUNSELING: Primary | ICD-10-CM

## 2021-09-30 DIAGNOSIS — O92.70 LACTATION PROBLEM: ICD-10-CM

## 2021-09-30 PROCEDURE — 99404 PREV MED CNSL INDIV APPRX 60: CPT | Performed by: PEDIATRICS

## 2021-09-30 NOTE — PATIENT INSTRUCTIONS
Nurse on demand: when baby gives hunger cues; when your breasts feel full, or at least every 3 hours counting from the beginning of one feeding to the beginning of the next; which ever comes first  When sucking and swallowing slow, gently compress the breast to restart flow  If active suck-swallow does not restart, gently remove the baby and offer the other breast; offering up to "four" breasts per feeding  Pay close attention to positioning for a deeper latch  Refer to the instructional video "Attaching Your Baby at the Breast" on the [address] [place] [address] website for further review  Feed expressed milk or formula if [place] is not content after nursing or if she is not making enough wet and soiled diapers  Paced bottle feeding technique is less stressful for your baby, prevents overfeeding and protects the breastfeeding relationship  You can find a video about paced bottle feeding at www lacted  org  Pump after feeding if you like (but be sure to allow lots of time for rest and enjoying [place]  When pumping, cycle your pump through stimulation and expression mode several times in a session to stimulate several let downs  Use hands on pumping and hand expression to increase your output  Maintain your pump as recommended  Use flange that fits comfortably and allows the breast to empty effectively  Monitor Denton's wet and soiled diapers closely and call if she is not making them as often as discussed  Follow up as scheduled  Please call with any questions or concerns

## 2021-09-30 NOTE — PROGRESS NOTES
INITIAL BREAST FEEDING EVALUATION    Informant/Relationship: Dianna and Maurice    Discussion of General Lactation Issues: [place] and Denton were referred by Peds due to concerns with Denton's weight  Michael Blunt feels that Denton's latch is not "good" at times  The first few days, she was never content unless she was at the breast  Michael Blunt began supplementing with formula after every feeding  She is much calmer now  Michael Blunt is OK with formula supplementation but would prefer to feed only her milk  Infant is 9days old today   History:  Fertility Problem:no  Breast changes:yes - areola were larger and darker, prominent veining, leaking,  no noticeable change in breast size    : yes - induced due to post dates  Full term:yes - 39 1/7 weeks   labor:no  First nursing/attempt < 1 hour after birth:yes - baby latched in the delivery room  Skin to skin following delivery:yes - after baby stabilized (meconium stained fluid)  Breast changes after delivery:yes - breasts are beginning to feel full today  Rooming in (infant in room with mother with exception of procedures, eg  Circumcision: went to the NBN at night  Blood sugar issues:no  NICU stay:no  Jaundice:no  Phototherapy:no  Supplement given: (list supplement and method used as well as reason(s):Not while in the hospital   Supplement began after discharge    Past Medical History:   Diagnosis Date    Chlamydia infection     Eczema          Current Outpatient Medications:     acetaminophen (TYLENOL) 325 mg tablet, Take 2 tablets (650 mg total) by mouth every 6 (six) hours as needed for headaches, Disp: , Rfl: 0    benzocaine-menthol-lanolin-aloe (DERMOPLAST) 20-0 5 % topical spray, Apply 1 application topically 4 (four) times a day as needed for mild pain or irritation, Disp: , Rfl: 0    docusate sodium (COLACE) 100 mg capsule, Take 1 capsule (100 mg total) by mouth 2 (two) times a day, Disp: , Rfl: 0    hydrocortisone 1 % cream, Apply 1 application topically 4 (four) times a day as needed for irritation, Disp: 30 g, Rfl: 0    ibuprofen (MOTRIN) 600 mg tablet, Take 1 tablet (600 mg total) by mouth every 6 (six) hours as needed for mild pain, Disp: 30 tablet, Rfl: 0    Prenatal MV-Min-Fe Fum-FA-DHA (PRENATAL+DHA PO), Take by mouth, Disp: , Rfl:     witch hazel-glycerin (TUCKS) topical pad, Apply 1 pad topically every 2 (two) hours as needed for irritation, Disp: , Rfl: 0    No Known Allergies    Social History     Substance and Sexual Activity   Drug Use No       Social History Former smoker    Interval Breastfeeding History:    Frequency of breast feeding: Currently every 3-4 hours since supplementation began  Does mother feel breastfeeding is effective: Yes, but not always  Does infant appear satisfied after nursing:No  Stooling pattern normal: No, not stooling every day  Urinating frequently:Yes  Using shield or shells: No    Alternative/Artificial Feedings:   Bottle: Yes, to supplement after every feeding  Cup: No  Syringe/Finger: No           Formula Type: Similac Pro Advance                     Amount: 15-20ml to supplement after feeding            Breast Milk:                      Amount: whatever Dianna is able to express            Frequency Q 3-4 Hr between feedings  Elimination Problems: Yes, infrequent stools      Equipment:  Nipple Shield             Type: none             Size: n/a             Frequency of Use: n/a  Pump            Type: Spectra S2            Frequency of Use: After every feeding  Expresses about 10-15ml each  Shells            Type: none            Frequency of use: n/a    Equipment Problems: no    Mom:  Breast: Large, symmetrical pendulous breasts with very large areola  Nipple Assessment in General: Normal: elongated/eraser, no discoloration and no damage noted  Mother's Awareness of Feeding Cues                 Recognizes:  Yes                  Verbalizes: Yes  Support System: FOB, extended family  History of Breastfeeding: none  Changes/Stressors/Violence: Debbi Staton has been concerned about Denton's weight gain, whether she is latching effectively and her milk production  Concerns/Goals: Dianna desires to EBF    Problems with Mom: delayed secretory activation    Physical Exam  Constitutional:       Appearance: Normal appearance  HENT:      Head: Normocephalic and atraumatic  Cardiovascular:      Rate and Rhythm: Normal rate and regular rhythm  Pulses: Normal pulses  Heart sounds: Normal heart sounds  Pulmonary:      Effort: Pulmonary effort is normal       Breath sounds: Normal breath sounds  Musculoskeletal:         General: Normal range of motion  Cervical back: Normal range of motion and neck supple  Neurological:      Mental Status: She is alert and oriented to person, place, and time  Skin:     General: Skin is warm and dry  Psychiatric:         Mood and Affect: Mood normal          Behavior: Behavior normal          Thought Content: Thought content normal          Judgment: Judgment normal          Infant:  Behaviors: Alert  Color: Pink  Birth weight: 3310gram  Current weight: 3120gram    Problems with infant: slow weight gain      General Appearance:  Alert, active, no distress                            Head:  Normocephalic, AFOF, sutures widely                             Eyes:   Conjunctiva clear, no drainage                            Ears:   Normally placed, no anomolies                           Nose:   no drainage or erythema                          Mouth:  No lesions  Tongue extends to the lower lip, lateralizes well but forms a "U" shape when crying  Good cupping of my finger noted with effective peristalsis of the entire tongue                     Neck:  Supple, symmetrical, trachea midline, no adenopathy; thyroid: no enlargement, symmetric, no tenderness/mass/nodules                Respiratory:  No grunting, flaring, retractions, breath sounds clear and equal Cardiovascular:  Regular rate and rhythm  No murmur  Adequate perfusion/capillary refill  Femoral pulse present                  Abdomen:    Soft, non-tender, no masses, bowel sounds present, no HSM            Genitourinary:  Normal female genitalia, anus patent                         Spine:   No abnormalities noted       Musculoskeletal:   Full range of motion         Skin/Hair/Nails:   Skin warm, dry, and intact, no rashes or abnormal dyspigmentation or lesions               Neurologic:   No abnormal movement, tone appropriate for gestational age    [place] Latch:  Efficiency:               Lips Flanged: Yes              Depth of latch: deep              Audible Swallow: Yes, frequently              Visible Milk: Yes              Wide Open/ Asymmetrical: Yes              Suck Swallow Cycle: Breathing: unlabored, Coordinated: yes  Nipple Assessment after latch: slightly flat and blanched briefly  Latch Problems: None  Dianna appeared confident and comfortable when positioning Denton and Denton latched effectively without too much trouble  She suckled effectively at the breast and Dianna had no pain  Position:  Infant's Ergonomics/Body               Body Alignment: Yes               Head Supported: Yes               Close to Mom's body/ Lifted/ Supported: Yes               Mom's Ergonomics/Body: Yes                           Supported: Yes                           Sitting Back: Yes                           Brings Baby to her breast: Yes  Positioning Problems: None      Handouts:   Paced bottle feeding, Hands on pumping, Hand expression and Latch Check List    Education:  Reviewed Latch: Demonstrated how to gently compress the breast and align the baby so that her nose is just above the nipple with her lower lip and chin touching the breast to encourage the deepest, widest, off-center latch    Reviewed Positioning for Dyad: Demonstrated how to position baby with her ear, shoulder and hip in alignment  Reviewed Frequency/Supply & Demand: Discussed how milk supply is established and maintained  Reviewed Infant:Cues and varied States of Awareness  Reviewed Infant Elimination: Discussed how the number of wet and soiled diapers is a reliable indicator of whether or not the baby is getting enough milk at this age  Reviewed Alternative/Artificial Feedings: Discussed and demonstrated paced bottle feeding  Reviewed Equipment: Discussed and demonstrated the use and features of the Spectra S2 and the elements of hands on pumping  Plan:   Reassurance and support given  I encouraged Dianna to continue to offer the breast at least every 3 hours  I made some suggestion to improve positioning for a deeper latch  I encouraged Dianna to use gentle breast compressions to increase flow as needed and to switch breasts when Denton is no longer actively drinking, offering each breast up to 2 times per feeding  She will supplement with expressed milk or formula via paced bottle feeding if Denton is still hungry after nursing or if diaper counts are not reassuring  I suggested pumping after nursing as Alen Favre desires to stimulate more milk production and to provide milk for supplementing  She was taught how to use the cycles of her pump and hands on technique  A follow up appointment was scheduled to check progress  I have spent 90 minutes with Patient and family today in which greater than 50% of this time was spent in counseling/coordination of care regarding Patient and family education

## 2021-10-01 ENCOUNTER — TELEPHONE (OUTPATIENT)
Dept: POSTPARTUM | Facility: CLINIC | Age: 31
End: 2021-10-01

## 2021-10-03 NOTE — PROGRESS NOTES
I have reviewed the notes, assessments, and/or procedures performed by [place] Mcadams RN, IBCLC, I concur with her/his documentation of Josesito Granados MD 10/03/21

## 2021-10-07 ENCOUNTER — OFFICE VISIT (OUTPATIENT)
Dept: POSTPARTUM | Facility: CLINIC | Age: 31
End: 2021-10-07
Payer: COMMERCIAL

## 2021-10-07 DIAGNOSIS — O92.79 INSUFFICIENT LACTATION: ICD-10-CM

## 2021-10-07 DIAGNOSIS — Z71.89 ENCOUNTER FOR BREAST FEEDING COUNSELING: Primary | ICD-10-CM

## 2021-10-07 PROCEDURE — 99404 PREV MED CNSL INDIV APPRX 60: CPT | Performed by: PEDIATRICS

## 2021-10-14 ENCOUNTER — POSTPARTUM VISIT (OUTPATIENT)
Dept: OBGYN CLINIC | Facility: CLINIC | Age: 31
End: 2021-10-14

## 2021-10-14 VITALS — SYSTOLIC BLOOD PRESSURE: 102 MMHG | WEIGHT: 206.4 LBS | DIASTOLIC BLOOD PRESSURE: 62 MMHG | BODY MASS INDEX: 30.48 KG/M2

## 2021-10-14 PROCEDURE — 99024 POSTOP FOLLOW-UP VISIT: CPT | Performed by: OBSTETRICS & GYNECOLOGY

## 2021-12-02 ENCOUNTER — TELEPHONE (OUTPATIENT)
Dept: OBGYN CLINIC | Facility: CLINIC | Age: 31
End: 2021-12-02

## 2021-12-06 ENCOUNTER — DOCUMENTATION (OUTPATIENT)
Dept: OBGYN CLINIC | Facility: CLINIC | Age: 31
End: 2021-12-06

## 2022-01-05 ENCOUNTER — TELEMEDICINE (OUTPATIENT)
Dept: FAMILY MEDICINE CLINIC | Facility: CLINIC | Age: 32
End: 2022-01-05
Payer: COMMERCIAL

## 2022-01-05 VITALS — WEIGHT: 206 LBS | BODY MASS INDEX: 30.51 KG/M2 | HEIGHT: 69 IN

## 2022-01-05 DIAGNOSIS — B34.9 VIRAL INFECTION, UNSPECIFIED: ICD-10-CM

## 2022-01-05 DIAGNOSIS — Z11.59 NEED FOR HEPATITIS C SCREENING TEST: Primary | ICD-10-CM

## 2022-01-05 PROCEDURE — 99213 OFFICE O/P EST LOW 20 MIN: CPT | Performed by: FAMILY MEDICINE

## 2022-01-05 PROCEDURE — U0003 INFECTIOUS AGENT DETECTION BY NUCLEIC ACID (DNA OR RNA); SEVERE ACUTE RESPIRATORY SYNDROME CORONAVIRUS 2 (SARS-COV-2) (CORONAVIRUS DISEASE [COVID-19]), AMPLIFIED PROBE TECHNIQUE, MAKING USE OF HIGH THROUGHPUT TECHNOLOGIES AS DESCRIBED BY CMS-2020-01-R: HCPCS | Performed by: FAMILY MEDICINE

## 2022-01-05 PROCEDURE — U0005 INFEC AGEN DETEC AMPLI PROBE: HCPCS | Performed by: FAMILY MEDICINE

## 2022-01-05 NOTE — PROGRESS NOTES
COVID-19 Outpatient Progress Note    Assessment/Plan:    Problem List Items Addressed This Visit     None      Visit Diagnoses     Need for hepatitis C screening test    -  Primary    Viral infection, unspecified        Relevant Orders    COVID Only - Collected at [facility] or [facility]         Disposition:     Referred patient to centralized site to test for COVID-19      otc meds prn  No work pending test results given symptoms and she is unvaccinated  No known definitive exposure  Will go for testing this AM    I have spent 11 minutes directly with the patient  Greater than 50% of this time was spent in counseling/coordination of care regarding: instructions for management  Encounter provider Savannah Hewitt MD    Provider located at [address] [place] [address] [address]  [facility] [address] [phone]    Recent Visits  No visits were found meeting these conditions  Showing recent visits within past 7 days and meeting all other requirements  Today's Visits  Date Type Provider Dept   01/05/22 Telemedicine Savannah Hewitt MD [facility] today's visits and meeting all other requirements  Future Appointments  No visits were found meeting these conditions  Showing future appointments within next 150 days and meeting all other requirements     This virtual check-in was done via [employer] [address] and patient was informed that this is a secure, HIPAA-compliant platform  She agrees to proceed  Patient agrees to participate in a virtual check in via telephone or video visit instead of presenting to the office to address urgent/immediate medical needs  Patient is aware this is a billable service  After connecting through [facility], the patient was identified by name and date of birth  Tana Gates was informed that this was a telemedicine visit and that the exam was being conducted confidentially over secure lines   Tana Gates acknowledged consent and understanding of privacy and security of the telemedicine visit  I informed the patient that I have reviewed her record in Epic and presented the opportunity for her to ask any questions regarding the visit today  The patient agreed to participate  Verification of patient location:  Patient is located in the following state in which I hold an active license: [place]    Subjective:   Bryce Liz is a 36 y o  female who is concerned about COVID-19  Patient's symptoms include nasal congestion, rhinorrhea, sore throat, cough and myalgias  Patient denies fever, chills, anosmia, loss of taste, shortness of breath, nausea, vomiting and diarrhea  Date of symptom onset: 2022  COVID-19 vaccination status: Not vaccinated    Exposure:   Contact with a person who is under investigation (PUI) for or who is positive for COVID-19 within the last 14 days?: No    Currently a healthcare worker that is involved in direct patient care?: Yes      She is unvaccinated  She had Covid-19 in 2021  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75      Lab Results   Component Value Date    Silvia Griffiths Negative 2021     Past Medical History:   Diagnosis Date    Chlamydia infection     Eczema      Past Surgical History:   Procedure Laterality Date    BREAST SURGERY Left 2008    benign mass age 14    Delcie Rony INDUCED       [facility], 10 years ago    WISDOM TOOTH EXTRACTION       Current Outpatient Medications   Medication Sig Dispense Refill    Prenatal MV-Min-Fe Fum-FA-DHA (PRENATAL+DHA PO) Take by mouth       No current facility-administered medications for this visit  No Known Allergies    Review of Systems   Constitutional: Negative for chills and fever  HENT: Positive for congestion, rhinorrhea and sore throat  Respiratory: Positive for cough  Negative for shortness of breath  Gastrointestinal: Negative for diarrhea, nausea and vomiting  Musculoskeletal: Positive for myalgias       Objective:    Vitals:    22 0904   Weight: 93 4 kg (206 lb)   Height: 5' 9" (1 753 m)       Physical Exam  Vitals and nursing note reviewed  Constitutional:       General: She is not in acute distress  Appearance: She is well-developed  She is not toxic-appearing  Comments: Tired appearing   HENT:      Head: Normocephalic and atraumatic  Nose: Congestion present  Eyes:      General:         Right eye: No discharge  Left eye: No discharge  Conjunctiva/sclera: Conjunctivae normal    Pulmonary:      Effort: No respiratory distress  Comments: occ cough  No conversational dyspnea    Neurological:      Mental Status: She is alert and oriented to person, place, and time  Psychiatric:         Behavior: Behavior normal          VIRTUAL VISIT Abraham verbally agrees to participate in [employer]  Pt is aware that [employer] could be limited without vital signs or the ability to perform a full hands-on physical Kishore Jain understands she or the provider may request at any time to terminate the video visit and request the patient to seek care or treatment in person

## 2022-01-24 ENCOUNTER — ANNUAL EXAM (OUTPATIENT)
Dept: OBGYN CLINIC | Facility: CLINIC | Age: 32
End: 2022-01-24
Payer: COMMERCIAL

## 2022-01-24 VITALS
SYSTOLIC BLOOD PRESSURE: 122 MMHG | WEIGHT: 218.6 LBS | BODY MASS INDEX: 32.38 KG/M2 | HEIGHT: 69 IN | DIASTOLIC BLOOD PRESSURE: 72 MMHG

## 2022-01-24 DIAGNOSIS — Z01.419 ROUTINE GYNECOLOGICAL EXAMINATION: Primary | ICD-10-CM

## 2022-01-24 PROBLEM — U07.1 COVID-19 AFFECTING PREGNANCY, ANTEPARTUM: Status: RESOLVED | Noted: 2021-05-25 | Resolved: 2022-01-24

## 2022-01-24 PROBLEM — O98.519 COVID-19 AFFECTING PREGNANCY, ANTEPARTUM: Status: RESOLVED | Noted: 2021-05-25 | Resolved: 2022-01-24

## 2022-01-24 PROBLEM — Z34.80 SUPERVISION OF OTHER NORMAL PREGNANCY: Status: RESOLVED | Noted: 2021-03-04 | Resolved: 2022-01-24

## 2022-01-24 PROCEDURE — S0612 ANNUAL GYNECOLOGICAL EXAMINA: HCPCS | Performed by: OBSTETRICS & GYNECOLOGY

## 2022-01-24 NOTE — PROGRESS NOTES
Dianna JANN MEZAS  1990      CC:  Yearly exam    S:  29 y o  female here for yearly exam  Her cycles are regular, not heavy or crampy   2021 - Denton  Formula feeding  EPDS = 2    Sexual activity:  Has not resumed sexual activity at this time  Would like to consider another pregnancy when Denton is a little older  Contraception: Abstinence    STD testing:  She does not want STD testing today  Gardasil:  She has not had the Gardasil series  Declines today  Last Pap 3/4/21 - Normal Cytology    We reviewed ASCCP guidelines for Pap testing today  Family hx of breast cancer: no  Family hx of ovarian cancer: no  Family hx of colon cancer: no      Current Outpatient Medications:     Prenatal MV-Min-Fe Fum-FA-DHA (PRENATAL+DHA PO), Take by mouth, Disp: , Rfl:     Family History   Problem Relation Age of Onset    Nephrolithiasis Mother     Hypertension Father     Obesity Father     Depression Father     Nephrolithiasis Maternal Grandmother     Hypotension Maternal Grandfather     Melanoma Maternal Grandfather     Stroke Paternal Grandmother 79    Hypertension Paternal Grandmother     Heart disease Paternal Grandmother     Heart attack Paternal Grandfather 79    No Known Problems Sister       Past Medical History:   Diagnosis Date    Chlamydia infection     Eczema         Review of Systems   Respiratory: Negative  Cardiovascular: Negative  Gastrointestinal: Negative for constipation and diarrhea  Genitourinary: Negative for difficulty urinating, pelvic pain, vaginal bleeding, vaginal discharge, itching or odor  O:  Blood pressure 122/72, height 5' 9" (1 753 m), weight 99 2 kg (218 lb 9 6 oz), last menstrual period 2022, not currently breastfeeding  Patient appears well and is not in distress  Breasts are symmetrical without mass, tenderness, nipple discharge, skin changes or adenopathy  Abdomen is soft and nontender without masses     External genitals are normal without lesions or rashes  Urethral meatus and urethra are normal  Bladder is normal to palpation  Vagina is normal without discharge or bleeding  Cervix is normal without discharge or lesion  Uterus is normal, mobile, nontender without palpable mass  Adnexa are normal, nontender, without palpable mass  A:  Yearly exam      P:   Pap up to date   RTO one year for yearly exam or sooner as needed

## 2022-01-24 NOTE — PROGRESS NOTES
Patient presenting for a yearly, first since delivery  Patient delivered vaginally on 09/23/2021 to a baby girl  Patient states to be using formula  Patient sexually active, declines birth control      Last pap: 03/04/2021, normal

## 2022-01-24 NOTE — PROGRESS NOTES
Patient's Lab: [facility]    Yearly PP  EPDS:  Delivery: 21    Formula Feeding    Last Yearly: N/A  Last Pap: 3/4/21  Results: -  LMP:   Menses flow:   S/P HPV Series:  S/P Covid Shot:  S/P Flu: Completed  Sexually Active:  STI Testing:    HX:    Q's/Concerns:

## 2022-10-24 ENCOUNTER — ULTRASOUND (OUTPATIENT)
Dept: OBGYN CLINIC | Facility: CLINIC | Age: 32
End: 2022-10-24

## 2022-10-24 VITALS — SYSTOLIC BLOOD PRESSURE: 116 MMHG | DIASTOLIC BLOOD PRESSURE: 60 MMHG | WEIGHT: 191.2 LBS | BODY MASS INDEX: 28.24 KG/M2

## 2022-10-24 DIAGNOSIS — Z3A.08 8 WEEKS GESTATION OF PREGNANCY: ICD-10-CM

## 2022-10-24 DIAGNOSIS — Z36.9 FIRST TRIMESTER SCREENING: ICD-10-CM

## 2022-10-24 DIAGNOSIS — Z36.89 ESTABLISH GESTATIONAL AGE, ULTRASOUND: ICD-10-CM

## 2022-10-24 DIAGNOSIS — N91.2 AMENORRHEA: Primary | ICD-10-CM

## 2022-10-24 DIAGNOSIS — Z33.1 INCIDENTAL PREGNANCY: ICD-10-CM

## 2022-10-24 NOTE — PATIENT INSTRUCTIONS
Again, congratulations on your pregnancy! NEXT STEPS  Get Prenatal bloodwork  Call MFM to schedule next ultrasound (done 12-13 wks)   Contact information for [facility] (AKA Maternal [facility]etal [facility])- Main Number [phone]   Return to our office in 1-2 weeks for an OB intake and initial prenatal Exam(or sooner if any problems/concerns arise- see packet for things to report)  Check out [employer] to read the "Pregnancy Essentials Guide" -this has lots of great early pregnancy information     It can be found by going to [employer]  org-->select services-->select women's health-->select Obstetrics  [email or web address] [email or web address]    Below is a variety of information that is useful in early pregnancy   Genetic screening can be very confusing for most people   We do recommend genetic screening universally for all pregnant women  Our goal is to have the most healthy uncomplicated pregnancy possible and this is one way to identify possible complications early  Common disorders we can screen for include: Down Syndrome, Neural tube defects ( like spina bifida or gastroschisis) and trisomy 15, even possibly more  There are several options we will talk more about  Below is some information to get you started thinking about this  We will discuss this more at the next appt  GUIDE TO GENETIC TESTING    Aneuploidy Testing  Trisomy 21 (Down Syndrome), Trisomy 18, and Open Neural Tube Defects (Spina Bifida)  You may choose one of the following options: See below for CPT/Diagnostic codes  NIPT (Non-Invasive Prenatal Testing or Cell Free- Fetal DNA): This simple and accurate non-invasive prenatal screening blood test offers results for early risk assessment of Down syndrome (Trisomy 21), or Trisomy 25, trisomy 15 and other aneuploidy conditions  The test also offers an optional analysis for fetal sex    The test analyzes the relative amount of 21, 18, 13; X and Y chromosome material in circulating cell-free DNA from a maternal blood sample  This test can be performed at any time after 10 weeks gestation  If you elect this test, you will also have an AFP (alpha-fetoprotein) blood test to test for open neural tube defects  Recommended follow up to a positive result is genetic counseling and prenatal diagnosis  Sequential Screening with Nuchal Translucency: This is a two-step test to detect whether a fetus is at increased risk for trisomy 24, trisomy 25, trisomy 15 and open neural tube defects  The test has a narrow window for testing (the first step must be performed between 10 and 13 weeks gestation)  It includes two blood draws and an ultrasound  The ultrasound measures the amount of fluid behind the baby’s neck called the nuchal translucency (NT)  The blood tests measures three different maternal hormone levels, -- pregnancy associated plasma protein (CHAVO-A), human chorionic gonadotrophin (hCG), and dimeric inhibin A (ERIK)  These measurements in combination with some maternal information such as height and weight are used to calculate whether the baby is at increased risk for Down Syndrome or Trisomy 18  An alpha-fetoprotein test (AFP) is also included to test for open neural tube defects  Recommended follow up to a positive result is additional testing that is more definitive, and referral for genetic counseling and prenatal diagnosis  Quad Screen: This test is also known as the quadruple marker test   It is a test that measures levels of four substances in a pregnant woman’s blood--Alpha-fetoprotein (AFP), a protein made by the developing baby; Human chorionic gonadotropin (hCG), a hormone made by the placenta; Estriol, a hormone made by the placenta and the baby’s liver; and Inhibin A, another hormone made by the placenta    Typically, the quad screen is done between weeks 15 and 20 of pregnancy--the second trimester and the results indicate whether the baby is at higher risk for Down Syndrome (Trisomy 21), Trisomy 18, and open neural tube defects  This is a screening test   Recommended follow up to a positive result is additional testing that is more definitive, as well as referral for genetic counseling and prenatal diagnosis  Trisomy 21 Trisomy 18 Trisomy 13   NIPT  (FPR 0 1%) <99% <98% 99%   Sequential Screening (FPR 3 5%) 92% 90% N/A   Quad Screen   (FPR 5%) 83% 80% N/A   (FPR is False Positive Rate)       Additional Screening Tests Offered  Cystic Fibrosis: Cystic Fibrosis is the most common inherited disease of children and young adults  The carrier frequency is 1 in 24, to 1 in 97  Both parents need to be carriers for a child to be affected (25% chance)  One in 200, children born are affected  Cystic fibrosis is a disorder of mucus production and produces abnormally thick mucus leading to life threatening lung infections, digestion problems, poor growth, infertility, and more  Symptoms range from mild to severe, but individuals with severe disease may die in childhood  With treatments today, people with Cystic Fibrosis can live into their 30’s  CF does not affect intelligence  Recommended follow up to a positive result is testing of the baby’s father  Spinal Muscular Atrophy (SMA): SMA is the most common inherited cause of early childhood death  The carrier frequency is 1 in 52 to 1 in 67 in the [place] and both parents need to be carriers for a child to be affected (25% chance)  1 in 11,000 children are affected  SMA is a progressive degeneration of lower motor neurons  Muscle weakness is the most common type with respiratory failure by the age of 3years old  Muscles responsible for crawling, walking, swallowing, and head and neck control are most severely affected  Recommended follow up to a positive result is testing of the baby’s father        Fragile X Syndrome (the most common inherited cause of developmental delays): Fragile X syndrome is an “X-linked” genetic disease, which means it is only carried by the mom  Unfortunately, 1 in 250 females are carriers and a child has a 50% chance of being affected if this is the case  1 in 4000 boys is affected with Fragile X and 1 in 8000 girls is affected  Approximately 1/3 of all children born with Fragile X also have autism and hyperactivity  Recommended follow up to a positive result is genetic counseling and prenatal diagnosis  Guide To Insurance Coverage For Genetic Screening  Due to the complexity of coverage guidelines, we are unable to quote benefits or guarantee insurance coverage for any of these tests  Insurance benefits are plan-specific and offer vastly different coverage based on your policy  The handout attached explains the benefits to each test, and also provides the billing (CPT) codes for each test   Even if the testing is covered, it could be applied to any unmet deductibles, and copays may apply, resulting in a bill  You are encouraged to contact your insurance company to obtain your benefits based on your age and risk factors, so that there are no surprises  Test Insurance Procedure (CPT) code   NIPT-cell free fetal DNA Most accurate non-invasive test- not always covered w/o risk factors 21972   Sequential Screen w/ NT US Current standard test-should be covered Part 1: (if lab is [facility]) 33154,29587   (If lab is [facility]) [address]  Part 2: (if lab is [facility])15529,41375,97158, [address]  (If lab is [facility]) 91994   Quad screen Old standard-use if past 1st trimester [address], [address] [facility], [address], [address]   CF- Cystic Fibrosis  10843   SMA- Spinal Musc   Atrophy  82535   Fragile X  [id number]       Diagnosis code used Varies based on history- But will be one of these:  Encounter for  screening for other genetic defect Z36 8  Advanced Maternal Age (over 28) O12 46  Family History of Genetic Disease Carrier Z84 81    Please contact your insurance company with the appropriate CPT code from the attached list, and diagnosis code applicable to your situation  We ask that you review this information and decide what testing you would like to have performed  Please note that the Sequential Screening with Nuchal Translucency has a smaller window of time to be performed during pregnancy (Prior to 14 wks)  [place] [address], [address]  [address] [place], [address]    Warning Signs During Pregnancy  The list below includes warning signs your providers would like you to be aware of  If you experience any of these at any time during your pregnancy, please call us as soon as possible  Vaginal bleeding   Sharp abdominal pain that does not go away   Fever (more than 100  4? F and is not relieved with Tylenol)   Persistent vomiting lasting greater than 24 hours   Chest pain   Pain or burning when you urinate   Severe headache that doesn’t resolve with Tylenol   Blurred vision or seeing spots in your vision   Sudden swelling of your face or hands   Redness, swelling or pain in a leg   A sudden weight gain in just a few days   Decrease in your baby’s movements (after 28 weeks or the 6th month of pregnancy)   A loss of watery fluid from your vagina - can be a gush, a trickle or  continuous wetness   After 20 weeks of pregnancy, rhythmic cramping (greater than 4 per hour)  or menstrual like low/pelvic pain    Call the OFFICE [phone] for any questions/emergencies  At night or on the weekend, please indicate it is an emergency and the DOCTOR on call will be paged      Discomforts of Early Pregnancy    Tips for coping with nausea and vomiting during pregnancy   Eat meals and snacks slowly   Eat every 1-2 hours to avoid a full stomach   Don’t skip meals, avoid empty stomach   Eat a snack prior to getting out of bed   Avoid food and beverages with a strong aroma   Avoid dehydration - drink enough fluid to keep the urine pale yellow   Drink fluids before a meal to minimize the effect of a full stomach   Limit the amount of coffee and beverages that contain caffeine   Eliminate spicy, odorous, high fat (fried foods), acidic (tomato products) and sweet foods   Fluids that contain lemon (lemonade), mint (tea) or orange can usually be well tolerated   Snacks and meals that contain low-fat protein (lean meats, fish, poultry and eggs) along with eating easily digestible carbs (fruit, rice, toast, crackers and dry cereal) may be tolerated better   Foods with ginger may be well tolerated  May use ginger root powder, capsules or extract (up to 1000 mg per day)   Drink liquids in small amounts    If symptoms persist, please contact your provider  Tips for coping with constipation during pregnancy   Increase fiber and fluids   - Drink 8-10 cups of liquid, like water or low-sugar juice daily  - Keep urine pale with fluids (water, milk), fruit and vegetables   Eat a well-balanced diet that contains high fiber food (fruits, vegetables, whole grain breads and cereals, bran and dried beans)   Take a 30-minute walk daily   You may take a mild stool softener such as Colace®    If symptoms persist, please contact your provider  For any emergencies, PLEASE CALL THE OFFICE at [phone]  If the office is closed, the doctor on call will be paged by the answering service  Medications and Pregnancy- see Pregnancy Essentials guide online- page 9    Expected Weight Gain During Pregnancy  If you have a healthy BMI (18-25) prior to pregnancy:  The recommended weight gain is between 25-35 pounds  Approximate weight gain  in the first trimester is 1-4 5 pounds  An expected weight gain during the second and  third trimester is approximately one pound per week  If you have a BMI of less than 18 prior to pregnancy,  you are considered underweight:  The recommended weight gain is between 28-40 pounds  Approximate weight gain  in the first trimester is 1-4 5 pounds   An expected weight gain during the second and  third trimester is just over one pound per week  If your BMI is 25 to 29 9: you are considered overweight:  You should gain 1/2 to 2/3 pound during the second and third trimester, for a total  weight gain of 15 to 25 pounds  If you have a BMI of greater than 30 prior to pregnancy,  you are considered overweight:  The recommended weight gain is between 15-25 pounds  Approximate weight gain  in the first trimester is 1-4 5 pounds  An expected weight gain during the second  and third trimester is approximately 0 5 pound per week  Foods to avoid during pregnancy:   Unpasteurized milk, juice and cheese  - Soft cheeses like feta or brie (if made with UNPASTEURIZED milk)   Unheated deli meats like lunchmeat and hotdogs   Undercooked poultry, beef, pork, seafood including raw sushi    What fish is safe to eat during pregnancy? Eat 8 to 12 ounces of fish a week  Pick from this group frequently, especially if you follow  the American Heart Association’s recommendation to eat fish at least 2 times a week  AVOID HIGH MERCURY FISH  A single meal of the following fish can put  you over the Environmental Protection  Agency’s safe limit for the month  High mercury fish:  Shark  Swordfish  [employer]  Tile Fish    Caffeine and Pregnancy    The [employer] and [employer] of Obstetrics and Gynecologists (ACOG) urge pregnant  women to limit their caffeine consumption to no more than 200 milligrams (mg) per day  This is  comparable to having one 12-ounce cup of coffee a day  This level has been shown not to increase risk  of miscarriage, growth or  labor complications  Effects of higher levels are not known  Exercise During Pregnancy  A daily exercise program that consists of 30 minutes a day is recommended     Low impact exercises like walking and swimming are great exercises throughout  all of pregnancy   If you’re an avid strength  avoid lifting very heavy weights - nothing more  than 30 pounds    Drink plenty of fluids while exercising to stay hydrated  Be careful to avoid overheating  ACTIVITIES TO AVOID   Exercises that can make you lose your balance  Activities that can put your baby at risk i e  horseback riding, scuba diving, skiing  or snowboarding  Any other sport that puts you at risk for getting hit in the  abdominal area  Do not use saunas, steam rooms or hot tubs (that have a higher temperature  than 100F)   After the first trimester, avoid exercises that require you to lay flat on your back  Avoid exceeding a heart rate greater than 140 beats per minute   As long as you are  able to hold a conversation while exercising your heart rate is likely acceptable

## 2022-10-24 NOTE — PROGRESS NOTES
Subjective  Patient ID: Nadia Sexton is a 29 y o  female here for Pregnancy Ultrasound (Lmp-22  8wks4d no nausea no vomiting no cramping or bleeding  Periods regular prior to pregnancy )    She is C/O amenorrhea  Signs and symptoms of pregnancy:    Breast tenderness yes   Fatigue yes   Cramping or Pelvic Pain yes   Spotting or Vaginal Bleeding yes   Nausea or vomiting yes    Patient's last menstrual period was 2022  giving her an CAT of 23 and a gestational age of  8w3d (based on LMP)    Menstrual cycle: regular, cycle length:  28 days  Pregnancy was planned  She has started taking a prenatal vitamin    OB History    Para Term  AB Living   4 1 1 0 2 1   SAB IAB Ectopic Multiple Live Births   1 0 0 0 1      # Outcome Date GA Lbr Virgilio/2nd Weight Sex Delivery Anes PTL Lv   4 Current            3 Term 21 41w1d / 01:32 3310 g (7 lb 4 8 oz) F Vag-Spont EPI N LAMAR   2 AB            1 SAB                 The following portions of the patient's history were reviewed and updated as appropriate: allergies, current medications, past family history, past medical history, past social history, past surgical history, and problem list   Perinent hx that may affect pregnancy    The following portions of the patient's history were reviewed and updated as appropriate: allergies, current medications, past family history, past medical history, past social history, past surgical history, and problem list     Review of Systems    See HPI for pertinent positives  /60 (BP Location: Right arm, Patient Position: Sitting, Cuff Size: Large)   Wt 86 7 kg (191 lb 3 2 oz)   LMP 2022   BMI 28 24 kg/m²   OBGyn Exam      FIRST TRIMESTER OBSTETRIC ULTRASOUND  [id number]   Patient's last menstrual period was 2022      INDICATION: Establish GA      FINDINGS:  A single intrauterine gestation is identified  Gestational Sac: Present and normal appearing   Yolk Sac:  Present and normal in size and appearance  Mean Crown-Rump Length:  18 5 mm = 8 weeks 2 days   Cardiac activity is detected at 169  Adnexa:  No adnexal mass or pathologic cyst   Cul de Sac:  No significant free fluid identified     IMPRESSION:  Single intrauterine pregnancy of 8 weeks 2days gestational age  Fetal cardiac activity detected  No adnexal masses seen  EDC by LMP: 6/1/23  EDC by this Ultrasound: 6/3/23  S=D  EDC by LMP      Ultrasound Probe Disinfection    A transvaginal ultrasound was performed  Prior to use, disinfection was performed with High Level Disinfection Process ([employer])  Probe serial number F: [id number] was used  Assessment/Plan:       She is a [id number] with Patient's last menstrual period was 08/25/2022  US today is showing a viable IUP  S=D  EDC by LMP= 6/1/23  F/U for ob intake, followed by initial prenatal exam in  2 wks           Problem List Items Addressed This Visit    None     Visit Diagnoses     Amenorrhea    -  Primary    Establish gestational age, ultrasound        11 weeks gestation of pregnancy        First trimester screening        Relevant Orders    Ambulatory Referral to Maternal Fetal Medicine    Incidental pregnancy        Relevant Orders    Ambulatory Referral to Maternal Fetal Medicine          Orders Placed This Encounter   Procedures   • Ambulatory Referral to Maternal Fetal Medicine

## 2022-11-14 ENCOUNTER — INITIAL PRENATAL (OUTPATIENT)
Dept: OBGYN CLINIC | Facility: CLINIC | Age: 32
End: 2022-11-14

## 2022-11-14 VITALS — HEIGHT: 69 IN | BODY MASS INDEX: 28.29 KG/M2 | WEIGHT: 191 LBS

## 2022-11-14 DIAGNOSIS — Z34.81 PRENATAL CARE, SUBSEQUENT PREGNANCY, FIRST TRIMESTER: Primary | ICD-10-CM

## 2022-11-14 DIAGNOSIS — Z3A.08 8 WEEKS GESTATION OF PREGNANCY: ICD-10-CM

## 2022-11-14 NOTE — PROGRESS NOTES
OB INTAKE INTERVIEW  Patient is 32 y o y o  who presents for OB intake at 11wks  She is accompanied by: herself  The father of her baby Bridger Oneal) is involved in the pregnancy and is 28years old    Last Menstrual Period: 2022  Ultrasound: Measured 8 weeks 2 days on 10/24/2022 by Tone Hua  Estimated Date of Delivery: 2022 confirmed 8 week US    Signs/Symptoms of Pregnancy  Current pregnancy symptoms: none  Constipation no  Headaches no  Cramping/spotting no  PICA cravings no    Diabetes-  Body mass index is 28 21 kg/m²  If patient has 1 or more, please order early 1 hour GTT  History of GDM no  BMI >35 no  History of PCOS or current metformin use no  History of LGA/macrosomic infant (4000g/9lbs) no    If patient has 2 or more, please order early 1 hour GTT  BMI>30 no  AMA no  First degree relative with type 2 diabetes no  History of chronic HTN, hyperlipidemia, elevated A1C no  High risk race (, , ,  or ) no    Hypertension- if you answer yes, please order preeclampsia labs (cbc, comprehensive metabolic panel, urine protein creatinine ratio, uric acid)  History of of chronic HTN no  History of gestational HTN no  History of preeclampsia, eclampsia, or HELLP syndrome no  History of diabetes no  History of lupus, autoimmune disease, kidney disease no    Thyroid- if yes order TSH with reflex T4  History of thyroid disease no    Bleeding Disorder or Hx of DVT-patient or first degree relative with history of  Order the following if not done previously     (Factor V, antithrombin III, prothrombin gene mutation, protein C and S Ag, lupus anticoagulant, anticardiolipin, beta-2 glycoprotein)   no    OB/GYN-  History of abnormal pap smear no  History of HPV no  History of Herpes/HSV no  History of other STI (gonorrhea, chlamydia, trich) YES- CT in past  History of prior  YESx1  History of prior  no  History of  delivery prior to 36 weeks 6 days no  History of blood transfusion no  Ok for blood transfusion YES    Substance screening- if yes outside of tobacco for her or anyone in her home-order urine drug screen  History of tobacco use YES  Currently using tobacco no  Currently using alcohol no  Presently using drugs no  Past drug use  YES- marijuana last used 2019  IV drug use-If yes add Hep C antibody to labs no  Partner drug use no  Parent/Family drug use no    MRSA Screening-   Does the pt have a hx of MRSA? no  If yes- please follow MRSA protocol and obtain a nasal swab for MRSA culture    Immunizations:  Influenza vaccine given this season: not yet  Discussed Tdap vaccine YES  Discussed COVID Vaccine YES    Genetic/MFM-  Do you or your partner have a history of any of the following in yourselves or first degree relatives? Cystic fibrosis no  Spinal muscular atrophy no  Hemoglobinopathy/Sickle Cell/Thalassemia no  Fragile X Intellectual Disability no    If yes, discuss carrier screening and recommend consultation with [facility]/genetic counseling  If no, discuss option for carrier screening and/or genetic testing with Nuchal Ultrasound  Patient interested YES  Appointment at [facility] made YES- NT 11/25  Interview education  [facility] Pregnancy Essentials Book reviewed and discussed YES    Nurse/Family Partnership- patient may qualify NO; referral placed NO    Prenatal lab work scripts YES  Extra labs ordered:  none    The patient has a history now or in prior pregnancy notable for:  none      Details that I feel the provider should be aware of: [place] are expecting baby #2 with Beau anderson doing pretty well  Early pregnancy symptoms are resolving  Fairly neg med hx  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

## 2022-11-14 NOTE — PATIENT INSTRUCTIONS
Congratulations!! Please review our Pregnancy Essential Guide and [address] L&D Virtual tour from our [employer]  [facility] Pregnancy Essentials Guide  [facility] ([address] [facility])     [facility] (Diamond Portillo  [facility])

## 2022-11-21 ENCOUNTER — INITIAL PRENATAL (OUTPATIENT)
Dept: OBGYN CLINIC | Facility: CLINIC | Age: 32
End: 2022-11-21

## 2022-11-21 VITALS — SYSTOLIC BLOOD PRESSURE: 106 MMHG | WEIGHT: 192.2 LBS | DIASTOLIC BLOOD PRESSURE: 76 MMHG | BODY MASS INDEX: 28.38 KG/M2

## 2022-11-21 DIAGNOSIS — Z34.81 PRENATAL CARE, SUBSEQUENT PREGNANCY, FIRST TRIMESTER: Primary | ICD-10-CM

## 2022-11-21 DIAGNOSIS — Z3A.12 12 WEEKS GESTATION OF PREGNANCY: ICD-10-CM

## 2022-11-21 DIAGNOSIS — Z11.3 SCREENING FOR STDS (SEXUALLY TRANSMITTED DISEASES): ICD-10-CM

## 2022-11-21 DIAGNOSIS — Z01.419 ENCOUNTER FOR GYNECOLOGICAL EXAMINATION WITHOUT ABNORMAL FINDING: ICD-10-CM

## 2022-11-21 DIAGNOSIS — Z11.51 SCREENING FOR HPV (HUMAN PAPILLOMAVIRUS): ICD-10-CM

## 2022-11-21 LAB
SL AMB  POCT GLUCOSE, UA: NEGATIVE
SL AMB POCT URINE PROTEIN: NEGATIVE

## 2022-11-21 NOTE — PROGRESS NOTES
Patient is here for Initial PN 1 visit  She denies any complaints  12wks/4days  CAT: 6/1/23  Last pap: 3/4/21 Pap: neg/HPV: N/A  GC/CH collected:yes  PN 1 labs not completed  She said she will get them done tomorrow  Flu vaccine: no- will be getting that at the clinic   Covid vaccine: yes  Urine: protein: neg/glucose: neg  Blue information packet given: yes and reviewed  Nuchal: 11/25/22  Level II US scheduled: not scheduled yet

## 2022-11-21 NOTE — PROGRESS NOTES
29 y o  [id number] at 12w4d with Estimated Date of Delivery: 23 by LMP, consistent w/ 1st trimester US  She denies nausea/vomiting and bleeding/cramping  Prenatal labs: not yet completed - planning to go tomorrow     Genetic screening: Scheduled with [facility] 22 for sequential screen  OB history:   #1 - SAB  #2 - TAB  #3 - 2021 Term  41w1d - female 7 lbs 4 8oz     GYN history: Last pap smear 3/4/2021  Hx of chlamydia - treated  No other STD hx      Past medical history: benign breast tumor removed in , otherwise unremarkable  Past surgical history: breast biopsy     Social history: Denies tobacco, alcohol, or illicit drug use  Family history: Father with HTN and depression  Otherwise noncontributory  Physical exam:     Fetal heart tones: 170    Patient appears well and is not in distress  Neck is supple without masses  Breasts are symmetrical without mass, tenderness, nipple discharge, skin changes or adenopathy  Abdomen is soft and nontender without masses  External genitals are normal without lesions or rashes  Vagina is normal without discharge or bleeding  Cervix is normal without discharge or lesion  Friable  Uterus is normal for gestational age  Adnexa are normal, nontender, without palpable mass  Discussed as well during this visit was diet, prenatal vitamins, prenatal visits, lab testing, breast feeding, vaccinations, maternal fetal medicine consultations, and lifestyle        ASSESSMENT/PLAN   Problem List Items Addressed This Visit        Other    12 weeks gestation of pregnancy   Other Visit Diagnoses     Screening for STDs (sexually transmitted diseases)    -  Primary    Relevant Orders    Chlamydia/GC amplified DNA by PCR    Encounter for gynecological examination without abnormal finding        Relevant Orders    Liquid-based pap, screening    Screening for HPV (human papillomavirus)        Relevant Orders    Liquid-based pap, screening Prenatal care, subsequent pregnancy, first trimester        Relevant Orders    POCT urine dip          1 - Gonorrhea and Chlamydia cultures obtained today  2 - Pap smear with HPV co-testing done today  3 - Prenatal labs - need to be completed - planning to go tomorrow   4 - Genetic screening scheduled for 11/25/22  5 - RTO in 4 weeks for routine PN visit    Flu vaccine declined - planning to get at work AMAURY Peck packet given and reviewed     All questions were answered & Dianna expressed understanding

## 2022-11-21 NOTE — PATIENT INSTRUCTIONS
Pregnancy at 11 to 14 Weeks   AMBULATORY CARE:   Changes happening to your body: You are now at the end of your first trimester and entering your second trimester  Morning sickness usually goes away by this time  You may have other symptoms such as fatigue, frequent urination, and headaches  You may have gained 2 to 4 pounds by now  Seek care immediately if:   You have pain or cramping in your abdomen or low back  You have heavy vaginal bleeding or clotting  You pass material that looks like tissue or large clots  Collect the material and bring it with you  Call your doctor or obstetrician if:   You cannot keep food or drinks down, and you are losing weight  You have light vaginal bleeding  You have chills or a fever  You have vaginal itching, burning, or pain  You have yellow, green, white, or foul-smelling vaginal discharge  You have pain or burning when you urinate, less urine than usual, or pink or bloody urine  You have questions or concerns about your condition or care  How to care for yourself at this stage of your pregnancy:       Get plenty of rest   You may feel more tired than normal  You may need to take naps or go to bed earlier  Manage nausea and vomiting  Avoid fatty and spicy foods  Eat small meals throughout the day instead of large meals  Luz may help to decrease nausea  Ask your healthcare provider about other ways of decreasing nausea and vomiting  Eat a variety of healthy foods  Healthy foods include fruits, vegetables, whole-grain breads, low-fat dairy foods, beans, lean meats, and fish  Drink liquids as directed  Ask how much liquid to drink each day and which liquids are best for you  Limit caffeine to less than 200 milligrams each day  Limit your intake of fish to 2 servings each week  Choose fish low in mercury such as canned light tuna, shrimp, salmon, cod, or tilapia   Do not  eat fish high in mercury such as swordfish, tilefish, payal mackerel, and shark  Take prenatal vitamins as directed  Your need for certain vitamins and minerals, such as folic acid, increases during pregnancy  Prenatal vitamins provide some of the extra vitamins and minerals you need  Prenatal vitamins may also help to decrease the risk of certain birth defects  Do not smoke  Smoking increases your risk of a miscarriage and other health problems during your pregnancy  Smoking can cause your baby to be born too early or weigh less at birth  Ask your healthcare provider for information if you need help quitting  Do not drink alcohol  Alcohol passes from your body to your baby through the placenta  It can affect your baby's brain development and cause fetal alcohol syndrome (FAS)  FAS is a group of conditions that causes mental, behavior, and growth problems  Talk to your healthcare provider before you take any medicines  Many medicines may harm your baby if you take them when you are pregnant  Do not take any medicines, vitamins, herbs, or supplements without first talking to your healthcare provider  Never use illegal or street drugs (such as marijuana or cocaine) while you are pregnant  Safety tips during pregnancy:   Avoid hot tubs and saunas  Do not use a hot tub or sauna while you are pregnant, especially during your first trimester  Hot tubs and saunas may raise your baby's temperature and increase the risk of birth defects  Avoid toxoplasmosis  This is an infection caused by eating raw meat or being around infected cat feces  It can cause birth defects, miscarriages, and other problems  Wash your hands after you touch raw meat  Make sure any meat is well-cooked before you eat it  Avoid raw eggs and unpasteurized milk  Use gloves or ask someone else to clean your cat's litter box while you are pregnant  Changes happening with your baby: Your baby has fully formed fingernails and toenails  Your baby's heartbeat can now be heard   Ask your healthcare provider if you can listen to your baby's heartbeat  By week 14, your baby is over 4 inches long from the top of the head to the rump (baby's bottom)  Your baby weighs over 3 ounces  Prenatal care:  Prenatal care is a series of visits with your healthcare provider throughout your pregnancy  During the first 28 weeks of your pregnancy, you will see your healthcare provider 1 time each month  Prenatal care can help prevent problems during pregnancy and childbirth  Your healthcare provider will check your blood pressure and weight  Your baby's heart rate will also be checked  You may also need the following at some visits:  A pelvic exam  allows your healthcare provider to see your cervix (the bottom part of your uterus)  Your healthcare provider will use a speculum to open your vagina  He or she will check the size and shape of your uterus  Blood tests  may be done to check for any of the following:    Gestational diabetes or anemia (low iron level)    Blood type or Rh factor, or certain birth defects    Immunity to certain diseases, such as chickenpox or rubella    An infection, such as a sexually transmitted infection, HIV, or hepatitis B    Hepatitis B  may need to be prevented or treated  Hepatitis B is inflammation of the liver caused by the hepatitis B virus (HBV)  HBV can spread from a mother to her baby during delivery  You will be checked for HBV as early as possible in the first trimester of each pregnancy  You need the test even if you received the hepatitis B vaccine or were tested before  You may need to have an HBV infection treated before you give birth  Urine tests  may also be done to check for sugar and protein  These can be signs of gestational diabetes or preeclampsia  Urine tests may also be done to check for signs of infection  A fetal ultrasound  shows pictures of your baby inside your uterus  The pictures are used to check your baby's development, movement, and position  Genetic disorder screening tests  may be offered to you  These tests check your baby's risk for genetic disorders such as Down syndrome  A screening test includes a blood test and ultrasound  Follow up with your doctor or obstetrician as directed:  Go to all prenatal visits  Write down your questions so you remember to ask them during your visits  © Copyright [address] 2022 Information is for End User's use only and may not be sold, redistributed or otherwise used for commercial purposes  All illustrations and images included in CareNotes® are the copyrighted property of [employer]  or [facility] [employer]   The above information is an  only  It is not intended as medical advice for individual conditions or treatments  Talk to your doctor, nurse or pharmacist before following any medical regimen to see if it is safe and effective for you

## 2022-11-22 ENCOUNTER — APPOINTMENT (OUTPATIENT)
Dept: LAB | Facility: CLINIC | Age: 32
End: 2022-11-22

## 2022-11-22 DIAGNOSIS — Z34.81 PRENATAL CARE, SUBSEQUENT PREGNANCY, FIRST TRIMESTER: ICD-10-CM

## 2022-11-22 LAB
ABO GROUP BLD: NORMAL
BACTERIA UR QL AUTO: ABNORMAL /HPF
BASOPHILS # BLD AUTO: 0.03 THOUSANDS/ÂΜL (ref 0–0.1)
BASOPHILS NFR BLD AUTO: 0 % (ref 0–1)
BILIRUB UR QL STRIP: NEGATIVE
BLD GP AB SCN SERPL QL: NEGATIVE
CLARITY UR: ABNORMAL
COLOR UR: YELLOW
EOSINOPHIL # BLD AUTO: 0.21 THOUSAND/ÂΜL (ref 0–0.61)
EOSINOPHIL NFR BLD AUTO: 2 % (ref 0–6)
ERYTHROCYTE [DISTWIDTH] IN BLOOD BY AUTOMATED COUNT: 13.7 % (ref 11.6–15.1)
GLUCOSE UR STRIP-MCNC: NEGATIVE MG/DL
HBV SURFACE AG SER QL: NORMAL
HCT VFR BLD AUTO: 37.5 % (ref 34.8–46.1)
HCV AB SER QL: NORMAL
HGB BLD-MCNC: 12.5 G/DL (ref 11.5–15.4)
HGB UR QL STRIP.AUTO: NEGATIVE
HPV HR 12 DNA CVX QL NAA+PROBE: NEGATIVE
HPV16 DNA CVX QL NAA+PROBE: NEGATIVE
HPV18 DNA CVX QL NAA+PROBE: NEGATIVE
IMM GRANULOCYTES # BLD AUTO: 0.04 THOUSAND/UL (ref 0–0.2)
IMM GRANULOCYTES NFR BLD AUTO: 0 % (ref 0–2)
KETONES UR STRIP-MCNC: ABNORMAL MG/DL
LEUKOCYTE ESTERASE UR QL STRIP: ABNORMAL
LYMPHOCYTES # BLD AUTO: 2.24 THOUSANDS/ÂΜL (ref 0.6–4.47)
LYMPHOCYTES NFR BLD AUTO: 22 % (ref 14–44)
MCH RBC QN AUTO: 29.6 PG (ref 26.8–34.3)
MCHC RBC AUTO-ENTMCNC: 33.3 G/DL (ref 31.4–37.4)
MCV RBC AUTO: 89 FL (ref 82–98)
MONOCYTES # BLD AUTO: 0.56 THOUSAND/ÂΜL (ref 0.17–1.22)
MONOCYTES NFR BLD AUTO: 6 % (ref 4–12)
NEUTROPHILS # BLD AUTO: 7 THOUSANDS/ÂΜL (ref 1.85–7.62)
NEUTS SEG NFR BLD AUTO: 70 % (ref 43–75)
NITRITE UR QL STRIP: NEGATIVE
NON-SQ EPI CELLS URNS QL MICRO: ABNORMAL /HPF
NRBC BLD AUTO-RTO: 0 /100 WBCS
PH UR STRIP.AUTO: 5.5 [PH]
PLATELET # BLD AUTO: 285 THOUSANDS/UL (ref 149–390)
PMV BLD AUTO: 10.4 FL (ref 8.9–12.7)
PROT UR STRIP-MCNC: ABNORMAL MG/DL
RBC # BLD AUTO: 4.22 MILLION/UL (ref 3.81–5.12)
RBC #/AREA URNS AUTO: ABNORMAL /HPF
RH BLD: POSITIVE
RUBV IGG SERPL IA-ACNC: 80.8 IU/ML
SP GR UR STRIP.AUTO: 1.03 (ref 1–1.03)
SPECIMEN EXPIRATION DATE: NORMAL
UROBILINOGEN UR STRIP-ACNC: <2 MG/DL
WBC # BLD AUTO: 10.08 THOUSAND/UL (ref 4.31–10.16)
WBC #/AREA URNS AUTO: ABNORMAL /HPF

## 2022-11-23 LAB
C TRACH DNA SPEC QL NAA+PROBE: NEGATIVE
HIV 1+2 AB+HIV1 P24 AG SERPL QL IA: NORMAL
N GONORRHOEA DNA SPEC QL NAA+PROBE: NEGATIVE
RPR SER QL: NORMAL

## 2022-11-24 LAB — BACTERIA UR CULT: NORMAL

## 2022-11-25 ENCOUNTER — ROUTINE PRENATAL (OUTPATIENT)
Dept: PERINATAL CARE | Facility: CLINIC | Age: 32
End: 2022-11-25

## 2022-11-25 VITALS
BODY MASS INDEX: 28.94 KG/M2 | WEIGHT: 195.4 LBS | SYSTOLIC BLOOD PRESSURE: 118 MMHG | HEIGHT: 69 IN | HEART RATE: 70 BPM | DIASTOLIC BLOOD PRESSURE: 62 MMHG

## 2022-11-25 DIAGNOSIS — Z36.9 FIRST TRIMESTER SCREENING: ICD-10-CM

## 2022-11-25 DIAGNOSIS — Z33.1 INCIDENTAL PREGNANCY: ICD-10-CM

## 2022-11-25 DIAGNOSIS — Z3A.13 13 WEEKS GESTATION OF PREGNANCY: Primary | ICD-10-CM

## 2022-11-25 DIAGNOSIS — Z36.82 ENCOUNTER FOR (NT) NUCHAL TRANSLUCENCY SCAN: ICD-10-CM

## 2022-11-25 NOTE — PROGRESS NOTES
[address]: Ms Anay Hernández was seen today for nuchal translucency ultrasound  See ultrasound report under "OB Procedures" tab  Review of Systems   Constitutional: Negative for chills, fever and unexpected weight change  HENT: Negative for congestion, dental problem, facial swelling and sore throat  Eyes: Negative for visual disturbance  Respiratory: Negative for cough and shortness of breath  Cardiovascular: Negative for chest pain and palpitations  Gastrointestinal: Negative for diarrhea and vomiting  Endocrine: Negative for polydipsia  Genitourinary: Negative for dysuria and vaginal bleeding  Musculoskeletal: Negative for back pain and joint swelling  Skin: Negative for rash and wound  Allergic/Immunologic: Negative for immunocompromised state  Neurological: Negative for seizures and headaches  Hematological: Does not bruise/bleed easily  Psychiatric/Behavioral: Negative for hallucinations and suicidal ideas  Physical Exam  Constitutional:       General: She is not in acute distress  Appearance: Normal appearance  She is not ill-appearing, toxic-appearing or diaphoretic  HENT:      Head: Normocephalic and atraumatic  Nose: No congestion or rhinorrhea  Eyes:      General: No scleral icterus  Right eye: No discharge  Left eye: No discharge  Extraocular Movements: Extraocular movements intact  Conjunctiva/sclera: Conjunctivae normal    Pulmonary:      Effort: Pulmonary effort is normal  No respiratory distress  Musculoskeletal:      Cervical back: Normal range of motion  Skin:     Coloration: Skin is not jaundiced or pale  Findings: No erythema, lesion or rash  Neurological:      General: No focal deficit present  Mental Status: She is alert and oriented to person, place, and time     Psychiatric:         Mood and Affect: Mood normal          Behavior: Behavior normal          MDM:   I  Diagnoses/Problems addressed:  Self limited or minor problem: genetic screening options in pregnancy   II  Data: I reviewed notes from referring provider  III    Risk of morbidity: Low     Please don't hesitate to contact our office with any concerns or questions   -Benjamín Nolasco MD

## 2022-11-25 NOTE — PATIENT INSTRUCTIONS
Thank you for choosing us for your  care today  If you have any questions about your ultrasound or care, please do not hesitate to contact us or your primary obstetrician  Some general instructions for your pregnancy are:    Protect against coronavirus: get vaccinated - pregnant women are increased risk of severe COVID  Notify your primary care doctor if you have any symptoms  Exercise: Aim for 22 minutes per day (150 minutes per week) of regular exercise  Walking is great! Nutrition: aim for calcium-rich and iron-rich foods as well as healthy sources of protein  Learn about Preeclampsia: preeclampsia is a common, serious high blood pressure complication in pregnancy  A blood pressure of 924TBBM (systolic or top number) or 11IZNJ (diastolic or bottom number) is not normal and needs evaluation by your doctor  Aspirin is sometimes prescribed in early pregnancy to prevent preeclampsia in women with risk factors - ask your obstetrician if you should be on this medication  If you smoke, try to reduce how many cigarettes you smoke or try to quit completely  Do not vape  Other warning signs to watch out for in pregnancy or postpartum: chest pain, obstructed breathing or shortness of breath, seizures, thoughts of hurting yourself or your baby, bleeding, a painful or swollen leg, fever, or headache (see [facility] POST-BIRTH Warning Signs campaign)  If these happen call 911  Itching is also not normal in pregnancy and if you experience this, especially over your hands and feet, potentially worse at night, notify your doctors

## 2022-11-25 NOTE — LETTER
November 25, 2022     Cherelle Potts  [address] [facility] [facility]  [address], [facility] [address] [facility] [id number]    Patient: Carl Dance   YOB: 1990   Date of Visit: 11/25/2022       Dear Dr Eliot Mcnulty: Thank you for referring Sweta Linton to me for evaluation  Below are my notes for this consultation  If you have questions, please do not hesitate to call me  I look forward to following your patient along with you           Sincerely,        Uzair Gross MD        CC: No Recipients

## 2022-11-30 LAB
LAB AP GYN PRIMARY INTERPRETATION: NORMAL
Lab: NORMAL

## 2022-12-23 ENCOUNTER — ROUTINE PRENATAL (OUTPATIENT)
Dept: OBGYN CLINIC | Facility: CLINIC | Age: 32
End: 2022-12-23

## 2022-12-23 VITALS — WEIGHT: 196 LBS | DIASTOLIC BLOOD PRESSURE: 64 MMHG | SYSTOLIC BLOOD PRESSURE: 104 MMHG | BODY MASS INDEX: 28.94 KG/M2

## 2022-12-23 DIAGNOSIS — Z34.92 PRENATAL CARE, SECOND TRIMESTER: Primary | ICD-10-CM

## 2022-12-23 DIAGNOSIS — Z3A.17 17 WEEKS GESTATION OF PREGNANCY: ICD-10-CM

## 2022-12-23 NOTE — PROGRESS NOTES
Prenatal visit at 16 1/7wks  Denies ctxs/cramping, VB, LOF  No FM yet  Problem List Items Addressed This Visit        Other    17 weeks gestation of pregnancy     MSAFP given and discussed  Has level 2 scheduled  S/p COVID vaccine x 2  S/p flu vaccine          Other Visit Diagnoses     Prenatal care, second trimester    -  Primary    Relevant Orders    Alpha fetoprotein, maternal

## 2023-01-03 ENCOUNTER — APPOINTMENT (OUTPATIENT)
Dept: LAB | Facility: CLINIC | Age: 33
End: 2023-01-03

## 2023-01-03 DIAGNOSIS — Z34.92 PRENATAL CARE, SECOND TRIMESTER: ICD-10-CM

## 2023-01-04 LAB
ABO GROUP BLD: NORMAL
RH BLD: POSITIVE

## 2023-01-06 LAB
2ND TRIMESTER 4 SCREEN SERPL-IMP: NORMAL
AFP ADJ MOM SERPL: 0.64
AFP INTERP AMN-IMP: NORMAL
AFP INTERP SERPL-IMP: NORMAL
AFP INTERP SERPL-IMP: NORMAL
AFP SERPL-MCNC: 26.9 NG/ML
AGE AT DELIVERY: 31.3 YR
GA METHOD: NORMAL
GA: 18.7 WEEKS
IDDM PATIENT QL: NO
MULTIPLE PREGNANCY: NO
NEURAL TUBE DEFECT RISK FETUS: NORMAL %

## 2023-01-09 ENCOUNTER — TELEPHONE (OUTPATIENT)
Dept: OBGYN CLINIC | Facility: CLINIC | Age: 33
End: 2023-01-09

## 2023-01-09 NOTE — TELEPHONE ENCOUNTER
----- Message from Pinky Sharma MD sent at 1/8/2023  9:29 AM EST -----  Please let Thanh Priest know that her MSAFP was low risk for any open neural tube defects  Maternal Fetal Medicine will also look for any sign of these on her 20 week ultrasound for further reassurance        Thanks!! [id number] Exp Problem Focused - Mod. Complex

## 2023-01-13 ENCOUNTER — ROUTINE PRENATAL (OUTPATIENT)
Facility: HOSPITAL | Age: 33
End: 2023-01-13

## 2023-01-13 VITALS
WEIGHT: 201.4 LBS | SYSTOLIC BLOOD PRESSURE: 124 MMHG | DIASTOLIC BLOOD PRESSURE: 62 MMHG | BODY MASS INDEX: 29.83 KG/M2 | HEIGHT: 69 IN | HEART RATE: 74 BPM

## 2023-01-13 DIAGNOSIS — Z3A.20 20 WEEKS GESTATION OF PREGNANCY: ICD-10-CM

## 2023-01-13 DIAGNOSIS — Z36.3 ENCOUNTER FOR ANTENATAL SCREENING FOR MALFORMATIONS: Primary | ICD-10-CM

## 2023-01-13 DIAGNOSIS — Z36.86 ENCOUNTER FOR ANTENATAL SCREENING FOR CERVICAL LENGTH: ICD-10-CM

## 2023-01-13 NOTE — LETTER
January 13, 2023     [facility], [address] [place] [address]    Patient: Brian Horan   YOB: 1990   Date of Visit: 1/13/2023       Dear Dr Bhavesh Cleveland: Thank you for referring Melonie Ko to me for evaluation  Below are my notes for this consultation  If you have questions, please do not hesitate to call me  I look forward to following your patient along with you           Sincerely,        Fatuma Arzola MD        CC: No Recipients  Fatuma Arzola MD  1/13/2023  8:24 AM  Sign when Signing Visit  Please refer to the [facility] ultrasound report in Ob Procedures for additional information regarding today's visit

## 2023-01-13 NOTE — PROGRESS NOTES
Please refer to the [facility] ultrasound report in Ob Procedures for additional information regarding today's visit

## 2023-01-19 ENCOUNTER — ROUTINE PRENATAL (OUTPATIENT)
Dept: OBGYN CLINIC | Facility: CLINIC | Age: 33
End: 2023-01-19

## 2023-01-19 VITALS — SYSTOLIC BLOOD PRESSURE: 110 MMHG | WEIGHT: 204.2 LBS | BODY MASS INDEX: 30.16 KG/M2 | DIASTOLIC BLOOD PRESSURE: 64 MMHG

## 2023-01-19 DIAGNOSIS — Z34.92 PRENATAL CARE, SECOND TRIMESTER: Primary | ICD-10-CM

## 2023-01-19 PROBLEM — Z3A.20 20 WEEKS GESTATION OF PREGNANCY: Status: ACTIVE | Noted: 2022-10-24

## 2023-01-19 LAB
SL AMB  POCT GLUCOSE, UA: NORMAL
SL AMB POCT URINE PROTEIN: NORMAL

## 2023-01-19 NOTE — ASSESSMENT & PLAN NOTE
33 yo here for routine ob visit  [id number] at 21+0  She is feeling well  No contractions, leaking or bleeding  Good fetal movement  Reviewed future visits  Return in 4 wks

## 2023-01-19 NOTE — PROGRESS NOTES
Pt here for routine prenatal vist  GA: 21w 0d   RH +  Has no complaints or concerns   Good FM  UTD prenatal labs   UTD Flu, Covid

## 2023-01-19 NOTE — PROGRESS NOTES
20 weeks gestation of pregnancy  33 yo here for routine ob visit  [id number] at 21+0  She is feeling well  No contractions, leaking or bleeding  Good fetal movement  Reviewed future visits  Return in 4 wks

## 2023-02-13 ENCOUNTER — TELEPHONE (OUTPATIENT)
Dept: PERINATAL CARE | Facility: OTHER | Age: 33
End: 2023-02-13

## 2023-02-13 NOTE — TELEPHONE ENCOUNTER
Spoke with patient and confirmed her MFM appointment had to be rescheduled  Patient verbalized understanding of new time, date and location of appointment - 4/7/23  3:00  SEGUNDO  Patient denies further questions

## 2023-02-15 ENCOUNTER — ROUTINE PRENATAL (OUTPATIENT)
Dept: OBGYN CLINIC | Facility: CLINIC | Age: 33
End: 2023-02-15

## 2023-02-15 VITALS — BODY MASS INDEX: 31.34 KG/M2 | WEIGHT: 212.2 LBS | DIASTOLIC BLOOD PRESSURE: 52 MMHG | SYSTOLIC BLOOD PRESSURE: 100 MMHG

## 2023-02-15 DIAGNOSIS — Z3A.24 24 WEEKS GESTATION OF PREGNANCY: ICD-10-CM

## 2023-02-15 DIAGNOSIS — Z34.92 PRENATAL CARE, SECOND TRIMESTER: Primary | ICD-10-CM

## 2023-02-15 LAB
SL AMB  POCT GLUCOSE, UA: NORMAL
SL AMB POCT URINE PROTEIN: NORMAL

## 2023-02-15 NOTE — ASSESSMENT & PLAN NOTE
Devon Montemayor  is a 34 y o  [id number] @24w6d who presents for routine prenatal visit  Complains of dry cough x 4-5 days, associated with hoarse voice and mild nasal congestion  Hx of spring time allergies, not currently treating  No F/C/S, SOB, wheezing, CP, or GI upset  Reviewed and given meds safe in pregnancy  Recommended home COVID test and f/u with PCP/ urgent care for further evaluation  Precautions given  Denies other OB complaints  AFP - neg  Level II - 1/13/2023  F/u growth and missed anatomy at 32 weeks  28 week lab orders given  Plans to breastfeed  Good fetal movement  Denies contractions, cramping, leakage of fluid or vaginal bleeding  Reviewed PTL precautions and reasons to call

## 2023-02-15 NOTE — PROGRESS NOTES
Problem List Items Addressed This Visit        Other    24 weeks gestation of pregnancy     Mahsa Henry  is a 34 y o  [id number] @24w6d who presents for routine prenatal visit  Complains of dry cough x 4-5 days, associated with hoarse voice and mild nasal congestion  Hx of spring time allergies, not currently treating  No F/C/S, SOB, wheezing, CP, or GI upset  Reviewed and given meds safe in pregnancy  Recommended home COVID test and f/u with PCP/ urgent care for further evaluation  Precautions given  Denies other OB complaints  AFP - neg  Level II - 1/13/2023  F/u growth and missed anatomy at 32 weeks  28 week lab orders given  Plans to breastfeed  Good fetal movement  Denies contractions, cramping, leakage of fluid or vaginal bleeding  Reviewed PTL precautions and reasons to call            Other Visit Diagnoses     Prenatal care, second trimester    -  Primary    Relevant Orders    POCT urine dip (Completed)    Total Syphilis (IgG/IgM) Screening    Glucose, 1H PG    CBC    Anemia Panel w/Reflex, OB

## 2023-03-08 ENCOUNTER — APPOINTMENT (OUTPATIENT)
Dept: LAB | Facility: CLINIC | Age: 33
End: 2023-03-08

## 2023-03-08 DIAGNOSIS — Z34.92 PRENATAL CARE, SECOND TRIMESTER: ICD-10-CM

## 2023-03-08 LAB
ERYTHROCYTE [DISTWIDTH] IN BLOOD BY AUTOMATED COUNT: 14 % (ref 11.6–15.1)
GLUCOSE 1H P 50 G GLC PO SERPL-MCNC: 101 MG/DL (ref 40–134)
HCT VFR BLD AUTO: 35.9 % (ref 34.8–46.1)
HGB BLD-MCNC: 11.8 G/DL (ref 11.5–15.4)
MCH RBC QN AUTO: 30 PG (ref 26.8–34.3)
MCHC RBC AUTO-ENTMCNC: 32.9 G/DL (ref 31.4–37.4)
MCV RBC AUTO: 91 FL (ref 82–98)
PLATELET # BLD AUTO: 227 THOUSANDS/UL (ref 149–390)
PMV BLD AUTO: 11 FL (ref 8.9–12.7)
RBC # BLD AUTO: 3.93 MILLION/UL (ref 3.81–5.12)
WBC # BLD AUTO: 11.19 THOUSAND/UL (ref 4.31–10.16)

## 2023-03-09 LAB — TREPONEMA PALLIDUM IGG+IGM AB [PRESENCE] IN SERUM OR PLASMA BY IMMUNOASSAY: NORMAL

## 2023-03-16 ENCOUNTER — ROUTINE PRENATAL (OUTPATIENT)
Dept: OBGYN CLINIC | Facility: CLINIC | Age: 33
End: 2023-03-16

## 2023-03-16 VITALS — WEIGHT: 223.6 LBS | DIASTOLIC BLOOD PRESSURE: 70 MMHG | SYSTOLIC BLOOD PRESSURE: 110 MMHG | BODY MASS INDEX: 33.02 KG/M2

## 2023-03-16 DIAGNOSIS — Z3A.29 29 WEEKS GESTATION OF PREGNANCY: Primary | ICD-10-CM

## 2023-03-16 LAB
SL AMB  POCT GLUCOSE, UA: NORMAL
SL AMB POCT URINE PROTEIN: NORMAL

## 2023-03-16 NOTE — ASSESSMENT & PLAN NOTE
35 yo  at 29+0 here for routine ob visit  Feeling well  No contractions, leaking or bleeding  Good fetal movement  28 wk labs normal  tdap today  Return in 2 wks  The patient was counseled about the labor process  She was counseled regarding potential reasons that she may need a  section including arrest of dilation/descent, non-reassuring fetal status, or worsening maternal status  She was counseled on the risks of  including bleeding, infection, and injury to surrounding structures including the bowel and bladder  She was counseled that there are medical and surgical methods to manage excessive postpartum bleeding  She was counseled that in the event of excessive blood loss, she may require blood transfusion which includes a small risk of blood borne diseases such as hepatitis and HIV  The patient is OK with receiving a blood transfusion if necessary  The patient had an opportunity to ask questions and signed consent  She was counseled about the possible need for operative delivery using the vacuum or forceps and the indications for doing so  She was counseled that there is a small risk of  complications including intracranial hemorrhage, lacerations, nerve damage or fracture as well as the increased risk for more severe maternal laceration  The patient signed consent

## 2023-03-16 NOTE — PROGRESS NOTES
29 weeks gestation of pregnancy  33 yo  at 29+0 here for routine ob visit  Feeling well  No contractions, leaking or bleeding  Good fetal movement  28 wk labs normal  tdap today  Return in 2 wks  The patient was counseled about the labor process  She was counseled regarding potential reasons that she may need a  section including arrest of dilation/descent, non-reassuring fetal status, or worsening maternal status  She was counseled on the risks of  including bleeding, infection, and injury to surrounding structures including the bowel and bladder  She was counseled that there are medical and surgical methods to manage excessive postpartum bleeding  She was counseled that in the event of excessive blood loss, she may require blood transfusion which includes a small risk of blood borne diseases such as hepatitis and HIV  The patient is OK with receiving a blood transfusion if necessary  The patient had an opportunity to ask questions and signed consent  She was counseled about the possible need for operative delivery using the vacuum or forceps and the indications for doing so  She was counseled that there is a small risk of  complications including intracranial hemorrhage, lacerations, nerve damage or fracture as well as the increased risk for more severe maternal laceration  The patient signed consent

## 2023-03-27 ENCOUNTER — ROUTINE PRENATAL (OUTPATIENT)
Dept: OBGYN CLINIC | Facility: CLINIC | Age: 33
End: 2023-03-27

## 2023-03-27 VITALS — WEIGHT: 223.6 LBS | SYSTOLIC BLOOD PRESSURE: 108 MMHG | DIASTOLIC BLOOD PRESSURE: 78 MMHG | BODY MASS INDEX: 33.02 KG/M2

## 2023-03-27 DIAGNOSIS — Z34.83 PRENATAL CARE, SUBSEQUENT PREGNANCY, THIRD TRIMESTER: Primary | ICD-10-CM

## 2023-03-27 DIAGNOSIS — Z3A.30 30 WEEKS GESTATION OF PREGNANCY: ICD-10-CM

## 2023-03-27 LAB
SL AMB  POCT GLUCOSE, UA: NORMAL
SL AMB POCT URINE PROTEIN: NORMAL

## 2023-03-27 NOTE — PROGRESS NOTES
Problem List Items Addressed This Visit        Other    30 weeks gestation of pregnancy     Brandi Moore  is a 34 y o  [id number] @30w4d who presents for routine prenatal visit  28 wk labs - normal   Growth scan - 4/7/23   S/p TDAP, flu, and COVID vaccines   Some difficulty with breastfeeding with her daughter  Barely used pump  Declines order for now  Ped - has     Reports good fetal movement  Denies LOF, vaginal bleeding, regular uterine contractions, cramping, headaches or visual changes  Reviewed PTL precautions and FKC              Other Visit Diagnoses     Prenatal care, subsequent pregnancy, third trimester    -  Primary    Relevant Orders    POCT urine dip (Completed)

## 2023-03-27 NOTE — PROGRESS NOTES
Patient here for prenatal visit  She denies any complaints  Good fetal movement  Urine neg for protein and glucose  28 week labs completed  US scheduled 4/7/23

## 2023-03-27 NOTE — ASSESSMENT & PLAN NOTE
Areli Browning  is a 34 y o   @30w4d who presents for routine prenatal visit  28 wk labs - normal   Growth scan - 23   S/p TDAP, flu, and COVID vaccines   Some difficulty with breastfeeding with her daughter  Barely used pump  Declines order for now  Ped - has     Reports good fetal movement  Denies LOF, vaginal bleeding, regular uterine contractions, cramping, headaches or visual changes  Reviewed PTL precautions and FKC

## 2023-04-09 PROBLEM — Z3A.32 32 WEEKS GESTATION OF PREGNANCY: Status: ACTIVE | Noted: 2022-10-24

## 2023-04-09 PROBLEM — Z34.93 PREGNANCY, OBSTETRICAL CARE, THIRD TRIMESTER: Status: ACTIVE | Noted: 2023-04-09

## 2023-04-27 ENCOUNTER — ROUTINE PRENATAL (OUTPATIENT)
Dept: OBGYN CLINIC | Facility: CLINIC | Age: 33
End: 2023-04-27

## 2023-04-27 VITALS
BODY MASS INDEX: 33.4 KG/M2 | SYSTOLIC BLOOD PRESSURE: 102 MMHG | WEIGHT: 226.2 LBS | HEART RATE: 80 BPM | DIASTOLIC BLOOD PRESSURE: 60 MMHG

## 2023-04-27 DIAGNOSIS — Z3A.35 35 WEEKS GESTATION OF PREGNANCY: ICD-10-CM

## 2023-04-27 DIAGNOSIS — Z34.93 PREGNANCY, OBSTETRICAL CARE, THIRD TRIMESTER: Primary | ICD-10-CM

## 2023-04-27 LAB
SL AMB  POCT GLUCOSE, UA: NEGATIVE
SL AMB POCT URINE PROTEIN: NEGATIVE

## 2023-04-27 NOTE — PROGRESS NOTES
Prenatal visit at 35w0d  Denies ctxs, VB, LOF  Good FM  Hoping she won't need an induction  Briefly discussed option for elective induction at 39 weeks if needed/desired  Problem List Items Addressed This Visit        Other    35 weeks gestation of pregnancy     Has an old breast pump and not sure if she wants to breastfeed  Will pack bags and work on birth certificate worksheet  Car seat installed  Labor precautions reviewed  GBS at next visit            Pregnancy, obstetrical care, third trimester - Primary    Relevant Orders    POCT urine dip (Completed)

## 2023-04-27 NOTE — ASSESSMENT & PLAN NOTE
Has an old breast pump and not sure if she wants to breastfeed  Will pack bags and work on birth certificate worksheet  Car seat installed  Labor precautions reviewed  GBS at next visit

## 2023-05-08 ENCOUNTER — ROUTINE PRENATAL (OUTPATIENT)
Dept: OBGYN CLINIC | Facility: CLINIC | Age: 33
End: 2023-05-08

## 2023-05-08 VITALS
DIASTOLIC BLOOD PRESSURE: 70 MMHG | WEIGHT: 229.8 LBS | SYSTOLIC BLOOD PRESSURE: 108 MMHG | HEART RATE: 76 BPM | BODY MASS INDEX: 33.94 KG/M2

## 2023-05-08 DIAGNOSIS — Z3A.36 36 WEEKS GESTATION OF PREGNANCY: Primary | ICD-10-CM

## 2023-05-08 DIAGNOSIS — Z34.93 PREGNANCY, OBSTETRICAL CARE, THIRD TRIMESTER: ICD-10-CM

## 2023-05-08 LAB
SL AMB  POCT GLUCOSE, UA: NORMAL
SL AMB POCT URINE PROTEIN: NORMAL

## 2023-05-08 NOTE — ASSESSMENT & PLAN NOTE
Pamela Mejia  is a 34 y o  [id number] @36w4d who presents for routine prenatal visit  GBS collected today  Cervical check declined  Undecided on breastfeeding  Declines pump order for this reason  Perineal massage encouraged   39 week eIOL - not interested at this time  28 wk labs  - normal  S/p TDAP     Reports good fetal movement  Denies LOF, vaginal bleeding, regular uterine contractions, cramping, headaches or visual changes  Reviewed PTL precautions and FKC

## 2023-05-08 NOTE — PROGRESS NOTES
Patient here for prenatal visit  She denies spotting, LOF or cramping  Good fetal movement  GBS collected today  She declines cervical check  Urine neg for protein and glucose

## 2023-05-08 NOTE — PROGRESS NOTES
Problem List Items Addressed This Visit        Other    36 weeks gestation of pregnancy - Primary     Sharri Baugh  is a 34 y o  [id number] @36w4d who presents for routine prenatal visit  GBS collected today  Cervical check declined  Undecided on breastfeeding  Declines pump order for this reason  Perineal massage encouraged   39 week eIOL - not interested at this time  28 wk labs  - normal  S/p TDAP     Reports good fetal movement  Denies LOF, vaginal bleeding, regular uterine contractions, cramping, headaches or visual changes  Reviewed PTL precautions and FKC             Pregnancy, obstetrical care, third trimester    Relevant Orders    POCT urine dip (Completed)    Strep B DNA probe, amplification

## 2023-05-10 LAB — GP B STREP DNA SPEC QL NAA+PROBE: NEGATIVE

## 2023-05-16 ENCOUNTER — ROUTINE PRENATAL (OUTPATIENT)
Dept: OBGYN CLINIC | Facility: CLINIC | Age: 33
End: 2023-05-16

## 2023-05-16 VITALS
HEART RATE: 87 BPM | BODY MASS INDEX: 34.08 KG/M2 | DIASTOLIC BLOOD PRESSURE: 52 MMHG | WEIGHT: 230.8 LBS | SYSTOLIC BLOOD PRESSURE: 114 MMHG

## 2023-05-16 DIAGNOSIS — Z3A.37 37 WEEKS GESTATION OF PREGNANCY: ICD-10-CM

## 2023-05-16 DIAGNOSIS — Z34.93 PREGNANCY, OBSTETRICAL CARE, THIRD TRIMESTER: Primary | ICD-10-CM

## 2023-05-16 LAB
SL AMB  POCT GLUCOSE, UA: NEGATIVE
SL AMB POCT URINE PROTEIN: NEGATIVE

## 2023-05-16 NOTE — PROGRESS NOTES
Prenatal visit at 37w5d  Denies LOF, VB, and CTX  Baby active  Declined cervical check today  Aware will need one prior to induction  Methods of induction reviewed  Problem List Items Addressed This Visit        Other    37 weeks gestation of pregnancy     GBS negative  Birth plan handed in today - reviewed  FMLA paperwork handed in today  Carseat installed and bag packed  Labor precautions reviewed  Discussed options for induction - would like between 40-41 weeks  Will have triage schedule for post-dates           Pregnancy, obstetrical care, third trimester - Primary    Relevant Orders    POCT urine dip (Completed)

## 2023-05-16 NOTE — ASSESSMENT & PLAN NOTE
GBS negative  Birth plan handed in today - reviewed  FMLA paperwork handed in today  Carseat installed and bag packed  Labor precautions reviewed  Discussed options for induction - would like between 40-41 weeks  Will have triage schedule for post-dates

## 2023-05-17 ENCOUNTER — TELEPHONE (OUTPATIENT)
Dept: OBGYN CLINIC | Facility: CLINIC | Age: 33
End: 2023-05-17

## 2023-05-17 NOTE — TELEPHONE ENCOUNTER
----- Message from Satnam Bhakta MD sent at 5/16/2023  3:39 PM EDT -----  Regarding: Delivery planning  Procedure to be scheduled (IOL or CS): induction    CAT: Estimated Date of Delivery: 6/1/23     Indication for delivery: post-dates    Requested date (s) of delivery: 6/2/23 or later   If requested date is unavailable, is there a date by which the pt must be delivered?  6/7/2023    Physician preference: none    If IOL, anticipated method: cytotec/newman

## 2023-05-22 ENCOUNTER — TELEPHONE (OUTPATIENT)
Dept: OBGYN CLINIC | Facility: CLINIC | Age: 33
End: 2023-05-22

## 2023-05-22 NOTE — TELEPHONE ENCOUNTER
Called pt back  - pt states RLQ pain has improved  Advised to call with any of the symptoms discussed previously occur  Pt verbalized understanding

## 2023-05-22 NOTE — TELEPHONE ENCOUNTER
Pt called stating she was having some achey pains on her right side  Denies VB, LOF, Cxtns, has +FM  States her pain is a 5 out of 10  Pt works in [employer] [address] at [employer]  , states there was a long case this morning  Advised pt drink water- try changing position- ie: lying on left side, tylenol  Will call back in one hour to check symptoms

## 2023-05-25 ENCOUNTER — ROUTINE PRENATAL (OUTPATIENT)
Dept: OBGYN CLINIC | Facility: CLINIC | Age: 33
End: 2023-05-25

## 2023-05-25 VITALS — BODY MASS INDEX: 34.61 KG/M2 | WEIGHT: 234.4 LBS | SYSTOLIC BLOOD PRESSURE: 116 MMHG | DIASTOLIC BLOOD PRESSURE: 70 MMHG

## 2023-05-25 DIAGNOSIS — Z3A.39 39 WEEKS GESTATION OF PREGNANCY: ICD-10-CM

## 2023-05-25 DIAGNOSIS — Z3A.37 37 WEEKS GESTATION OF PREGNANCY: Primary | ICD-10-CM

## 2023-05-25 LAB
SL AMB  POCT GLUCOSE, UA: NEGATIVE
SL AMB POCT URINE PROTEIN: POSITIVE

## 2023-05-25 NOTE — PROGRESS NOTES
39 weeks gestation of pregnancy  33 yo  at 39+0 here for routine ob visit  Uncomfortable but doing well  No regular contractions, leaking or bleeding  Good fetal movement  Would like IOL late 40 weeks and discussed scheduling next week given her goals  Understands this may mean 41 wk IOL pending schedule  Understands plan for check next week  Return in 1 wk

## 2023-05-25 NOTE — PROGRESS NOTES
Pt is here for routine ob visit   No concerns at this time  Urine trace protein/neg glucose   No LOF,VB,Contractions  +FM   UTD tdap  Delivery consent signed at previous visit   GBS negative

## 2023-05-25 NOTE — ASSESSMENT & PLAN NOTE
35 yo  at 39+0 here for routine ob visit  Uncomfortable but doing well  No regular contractions, leaking or bleeding  Good fetal movement  Would like IOL late 40 weeks and discussed scheduling next week given her goals  Understands this may mean 41 wk IOL pending schedule  Understands plan for check next week  Return in 1 wk

## 2023-05-25 NOTE — TELEPHONE ENCOUNTER
I scheduled post dates induction of labor for pt on 6/4 at 8 pm  Dr Lenita Kawasaki is provider   Pt needs ripening  I scheduled with Dustin Freitas in triage     CS to be notified    Pt aware

## 2023-05-31 ENCOUNTER — ROUTINE PRENATAL (OUTPATIENT)
Dept: OBGYN CLINIC | Facility: CLINIC | Age: 33
End: 2023-05-31

## 2023-05-31 VITALS
DIASTOLIC BLOOD PRESSURE: 64 MMHG | SYSTOLIC BLOOD PRESSURE: 106 MMHG | WEIGHT: 237 LBS | HEART RATE: 85 BPM | BODY MASS INDEX: 35 KG/M2

## 2023-05-31 DIAGNOSIS — Z3A.39 39 WEEKS GESTATION OF PREGNANCY: ICD-10-CM

## 2023-05-31 DIAGNOSIS — Z34.83 PRENATAL CARE, SUBSEQUENT PREGNANCY, THIRD TRIMESTER: Primary | ICD-10-CM

## 2023-05-31 LAB
SL AMB  POCT GLUCOSE, UA: NORMAL
SL AMB POCT URINE PROTEIN: NORMAL

## 2023-05-31 NOTE — ASSESSMENT & PLAN NOTE
Doing well, ready for baby! No bleeding, LOF, contractions  Baby is active  Scheduled for IOL on 6/4 for ripening - still needed based on cervical exam today  Precautions reviewed

## 2023-05-31 NOTE — PROGRESS NOTES
Problem List Items Addressed This Visit        Other    39 weeks gestation of pregnancy     Doing well, ready for baby! No bleeding, LOF, contractions  Baby is active  Scheduled for IOL on 6/4 for ripening - still needed based on cervical exam today  Precautions reviewed           Other Visit Diagnoses     Prenatal care, subsequent pregnancy, third trimester    -  Primary    Relevant Orders    POCT urine dip (Completed)

## 2023-06-04 ENCOUNTER — ANESTHESIA (INPATIENT)
Dept: ANESTHESIOLOGY | Facility: HOSPITAL | Age: 33
End: 2023-06-04
Payer: COMMERCIAL

## 2023-06-04 ENCOUNTER — HOSPITAL ENCOUNTER (INPATIENT)
Facility: HOSPITAL | Age: 33
LOS: 3 days | Discharge: HOME | End: 2023-06-07
Attending: STUDENT IN AN ORGANIZED HEALTH CARE EDUCATION/TRAINING PROGRAM | Admitting: STUDENT IN AN ORGANIZED HEALTH CARE EDUCATION/TRAINING PROGRAM
Payer: COMMERCIAL

## 2023-06-04 ENCOUNTER — HOSPITAL ENCOUNTER (OUTPATIENT)
Dept: LABOR AND DELIVERY | Facility: HOSPITAL | Age: 33
Discharge: HOME | End: 2023-06-04
Payer: COMMERCIAL

## 2023-06-04 ENCOUNTER — ANESTHESIA EVENT (INPATIENT)
Dept: ANESTHESIOLOGY | Facility: HOSPITAL | Age: 33
End: 2023-06-04
Payer: COMMERCIAL

## 2023-06-04 DIAGNOSIS — Z3A.39 39 WEEKS GESTATION OF PREGNANCY: Primary | ICD-10-CM

## 2023-06-04 PROBLEM — Z3A.40 40 WEEKS GESTATION OF PREGNANCY: Status: ACTIVE | Noted: 2022-10-24

## 2023-06-04 LAB
ABO GROUP BLD: NORMAL
BASOPHILS # BLD AUTO: 0.07 THOUSANDS/ÂΜL (ref 0–0.1)
BASOPHILS NFR BLD AUTO: 1 % (ref 0–1)
BLD GP AB SCN SERPL QL: NEGATIVE
EOSINOPHIL # BLD AUTO: 0.29 THOUSAND/ÂΜL (ref 0–0.61)
EOSINOPHIL NFR BLD AUTO: 2 % (ref 0–6)
ERYTHROCYTE [DISTWIDTH] IN BLOOD BY AUTOMATED COUNT: 14.2 % (ref 11.6–15.1)
HCT VFR BLD AUTO: 39 % (ref 34.8–46.1)
HGB BLD-MCNC: 12.9 G/DL (ref 11.5–15.4)
HOLD SPECIMEN: NORMAL
IMM GRANULOCYTES # BLD AUTO: 0.28 THOUSAND/UL (ref 0–0.2)
IMM GRANULOCYTES NFR BLD AUTO: 2 % (ref 0–2)
LYMPHOCYTES # BLD AUTO: 3.45 THOUSANDS/ÂΜL (ref 0.6–4.47)
LYMPHOCYTES NFR BLD AUTO: 23 % (ref 14–44)
MCH RBC QN AUTO: 30 PG (ref 26.8–34.3)
MCHC RBC AUTO-ENTMCNC: 33.1 G/DL (ref 31.4–37.4)
MCV RBC AUTO: 91 FL (ref 82–98)
MONOCYTES # BLD AUTO: 0.98 THOUSAND/ÂΜL (ref 0.17–1.22)
MONOCYTES NFR BLD AUTO: 7 % (ref 4–12)
NEUTROPHILS # BLD AUTO: 9.9 THOUSANDS/ÂΜL (ref 1.85–7.62)
NEUTS SEG NFR BLD AUTO: 65 % (ref 43–75)
NRBC BLD AUTO-RTO: 0 /100 WBCS
PLATELET # BLD AUTO: 225 THOUSANDS/UL (ref 149–390)
PMV BLD AUTO: 12.3 FL (ref 8.9–12.7)
RBC # BLD AUTO: 4.3 MILLION/UL (ref 3.81–5.12)
RH BLD: POSITIVE
SPECIMEN EXPIRATION DATE: NORMAL
WBC # BLD AUTO: 14.97 THOUSAND/UL (ref 4.31–10.16)

## 2023-06-04 PROCEDURE — NC001 PR NO CHARGE: Performed by: STUDENT IN AN ORGANIZED HEALTH CARE EDUCATION/TRAINING PROGRAM

## 2023-06-04 PROCEDURE — 86901 BLOOD TYPING SEROLOGIC RH(D): CPT

## 2023-06-04 PROCEDURE — 4A1HXCZ MONITORING OF PRODUCTS OF CONCEPTION, CARDIAC RATE, EXTERNAL APPROACH: ICD-10-PCS | Performed by: OBSTETRICS & GYNECOLOGY

## 2023-06-04 PROCEDURE — 86850 RBC ANTIBODY SCREEN: CPT

## 2023-06-04 PROCEDURE — 3E033VJ INTRODUCTION OF OTHER HORMONE INTO PERIPHERAL VEIN, PERCUTANEOUS APPROACH: ICD-10-PCS | Performed by: OBSTETRICS & GYNECOLOGY

## 2023-06-04 PROCEDURE — 85025 COMPLETE CBC W/AUTO DIFF WBC: CPT

## 2023-06-04 PROCEDURE — 86780 TREPONEMA PALLIDUM: CPT

## 2023-06-04 PROCEDURE — 86900 BLOOD TYPING SEROLOGIC ABO: CPT

## 2023-06-04 RX ORDER — ACETAMINOPHEN 325 MG/1
650 TABLET ORAL EVERY 6 HOURS PRN
Status: DISCONTINUED | OUTPATIENT
Start: 2023-06-04 | End: 2023-06-05

## 2023-06-04 RX ORDER — BUPIVACAINE HYDROCHLORIDE 2.5 MG/ML
30 INJECTION, SOLUTION EPIDURAL; INFILTRATION; INTRACAUDAL ONCE AS NEEDED
Status: DISCONTINUED | OUTPATIENT
Start: 2023-06-04 | End: 2023-06-05

## 2023-06-04 RX ORDER — SODIUM CHLORIDE, SODIUM LACTATE, POTASSIUM CHLORIDE, CALCIUM CHLORIDE 600; 310; 30; 20 MG/100ML; MG/100ML; MG/100ML; MG/100ML
125 INJECTION, SOLUTION INTRAVENOUS CONTINUOUS
Status: DISCONTINUED | OUTPATIENT
Start: 2023-06-04 | End: 2023-06-05

## 2023-06-04 RX ORDER — ONDANSETRON 2 MG/ML
4 INJECTION INTRAMUSCULAR; INTRAVENOUS EVERY 6 HOURS PRN
Status: DISCONTINUED | OUTPATIENT
Start: 2023-06-04 | End: 2023-06-05

## 2023-06-04 RX ADMIN — SODIUM CHLORIDE, SODIUM LACTATE, POTASSIUM CHLORIDE, AND CALCIUM CHLORIDE 125 ML/HR: .6; .31; .03; .02 INJECTION, SOLUTION INTRAVENOUS at 22:30

## 2023-06-04 RX ADMIN — Medication 50 MCG: at 21:45

## 2023-06-05 LAB
BASE EXCESS BLDCOA CALC-SCNC: -5.3 MMOL/L (ref 3–11)
BASE EXCESS BLDCOV CALC-SCNC: -2.1 MMOL/L (ref 1–9)
HCO3 BLDCOA-SCNC: 21.4 MMOL/L (ref 17.3–27.3)
HCO3 BLDCOV-SCNC: 22.4 MMOL/L (ref 12.2–28.6)
O2 CT VFR BLDCOA CALC: 8.3 ML/DL
OXYHGB MFR BLDCOA: 33.7 %
OXYHGB MFR BLDCOV: 51.4 %
PCO2 BLDCOA: 45.6 MM[HG] (ref 30–60)
PCO2 BLDCOV: 38 MM HG (ref 27–43)
PH BLDCOA: 7.29 [PH] (ref 7.23–7.43)
PH BLDCOV: 7.39 [PH] (ref 7.19–7.49)
PO2 BLDCOA: 17 MM HG (ref 5–25)
PO2 BLDCOV: 21.1 MM HG (ref 15–45)
SAO2 % BLDCOV: 12.1 ML/DL
TREPONEMA PALLIDUM IGG+IGM AB [PRESENCE] IN SERUM OR PLASMA BY IMMUNOASSAY: NORMAL

## 2023-06-05 PROCEDURE — 10907ZC DRAINAGE OF AMNIOTIC FLUID, THERAPEUTIC FROM PRODUCTS OF CONCEPTION, VIA NATURAL OR ARTIFICIAL OPENING: ICD-10-PCS | Performed by: OBSTETRICS & GYNECOLOGY

## 2023-06-05 PROCEDURE — 59400 OBSTETRICAL CARE: CPT | Performed by: OBSTETRICS & GYNECOLOGY

## 2023-06-05 PROCEDURE — 0KQM0ZZ REPAIR PERINEUM MUSCLE, OPEN APPROACH: ICD-10-PCS | Performed by: OBSTETRICS & GYNECOLOGY

## 2023-06-05 PROCEDURE — 82805 BLOOD GASES W/O2 SATURATION: CPT | Performed by: STUDENT IN AN ORGANIZED HEALTH CARE EDUCATION/TRAINING PROGRAM

## 2023-06-05 RX ORDER — CALCIUM CARBONATE 500 MG/1
1000 TABLET, CHEWABLE ORAL DAILY PRN
Status: DISCONTINUED | OUTPATIENT
Start: 2023-06-05 | End: 2023-06-07 | Stop reason: HOSPADM

## 2023-06-05 RX ORDER — OXYTOCIN/RINGER'S LACTATE 30/500 ML
250 PLASTIC BAG, INJECTION (ML) INTRAVENOUS ONCE
Status: COMPLETED | OUTPATIENT
Start: 2023-06-05 | End: 2023-06-05

## 2023-06-05 RX ORDER — LIDOCAINE HYDROCHLORIDE AND EPINEPHRINE 15; 5 MG/ML; UG/ML
INJECTION, SOLUTION EPIDURAL AS NEEDED
Status: DISCONTINUED | OUTPATIENT
Start: 2023-06-05 | End: 2023-06-15 | Stop reason: HOSPADM

## 2023-06-05 RX ORDER — CLONIDINE 100 UG/ML
INJECTION, SOLUTION EPIDURAL AS NEEDED
Status: DISCONTINUED | OUTPATIENT
Start: 2023-06-05 | End: 2023-06-15 | Stop reason: HOSPADM

## 2023-06-05 RX ORDER — ONDANSETRON 2 MG/ML
4 INJECTION INTRAMUSCULAR; INTRAVENOUS EVERY 8 HOURS PRN
Status: DISCONTINUED | OUTPATIENT
Start: 2023-06-05 | End: 2023-06-07 | Stop reason: HOSPADM

## 2023-06-05 RX ORDER — SIMETHICONE 80 MG
80 TABLET,CHEWABLE ORAL 4 TIMES DAILY PRN
Status: DISCONTINUED | OUTPATIENT
Start: 2023-06-05 | End: 2023-06-07 | Stop reason: HOSPADM

## 2023-06-05 RX ORDER — IBUPROFEN 600 MG/1
600 TABLET ORAL EVERY 6 HOURS
Status: DISCONTINUED | OUTPATIENT
Start: 2023-06-05 | End: 2023-06-07 | Stop reason: HOSPADM

## 2023-06-05 RX ORDER — ROPIVACAINE HYDROCHLORIDE 2 MG/ML
INJECTION, SOLUTION EPIDURAL; INFILTRATION; PERINEURAL AS NEEDED
Status: DISCONTINUED | OUTPATIENT
Start: 2023-06-05 | End: 2023-06-15 | Stop reason: HOSPADM

## 2023-06-05 RX ORDER — FENTANYL CITRATE 50 UG/ML
INJECTION, SOLUTION INTRAMUSCULAR; INTRAVENOUS AS NEEDED
Status: DISCONTINUED | OUTPATIENT
Start: 2023-06-05 | End: 2023-06-15 | Stop reason: HOSPADM

## 2023-06-05 RX ORDER — DOCUSATE SODIUM 100 MG/1
100 CAPSULE, LIQUID FILLED ORAL 2 TIMES DAILY
Status: DISCONTINUED | OUTPATIENT
Start: 2023-06-05 | End: 2023-06-07 | Stop reason: HOSPADM

## 2023-06-05 RX ORDER — BUPIVACAINE HYDROCHLORIDE 2.5 MG/ML
INJECTION, SOLUTION EPIDURAL; INFILTRATION; INTRACAUDAL AS NEEDED
Status: DISCONTINUED | OUTPATIENT
Start: 2023-06-05 | End: 2023-06-15 | Stop reason: HOSPADM

## 2023-06-05 RX ORDER — DIPHENHYDRAMINE HCL 25 MG
25 TABLET ORAL EVERY 6 HOURS PRN
Status: DISCONTINUED | OUTPATIENT
Start: 2023-06-05 | End: 2023-06-07 | Stop reason: HOSPADM

## 2023-06-05 RX ORDER — DIAPER,BRIEF,INFANT-TODD,DISP
1 EACH MISCELLANEOUS DAILY PRN
Status: DISCONTINUED | OUTPATIENT
Start: 2023-06-05 | End: 2023-06-07 | Stop reason: HOSPADM

## 2023-06-05 RX ORDER — ACETAMINOPHEN 325 MG/1
650 TABLET ORAL EVERY 4 HOURS PRN
Status: DISCONTINUED | OUTPATIENT
Start: 2023-06-05 | End: 2023-06-07 | Stop reason: HOSPADM

## 2023-06-05 RX ORDER — OXYTOCIN/RINGER'S LACTATE 30/500 ML
1-30 PLASTIC BAG, INJECTION (ML) INTRAVENOUS
Status: DISCONTINUED | OUTPATIENT
Start: 2023-06-05 | End: 2023-06-05

## 2023-06-05 RX ORDER — EPHEDRINE SULFATE 50 MG/ML
INJECTION INTRAVENOUS AS NEEDED
Status: DISCONTINUED | OUTPATIENT
Start: 2023-06-05 | End: 2023-06-15 | Stop reason: HOSPADM

## 2023-06-05 RX ADMIN — ROPIVACAINE HYDROCHLORIDE 2.5 ML: 2 INJECTION, SOLUTION EPIDURAL; INFILTRATION at 18:20

## 2023-06-05 RX ADMIN — Medication 250 MILLI-UNITS/MIN: at 20:42

## 2023-06-05 RX ADMIN — FENTANYL CITRATE 50 MCG: 50 INJECTION INTRAMUSCULAR; INTRAVENOUS at 18:20

## 2023-06-05 RX ADMIN — SODIUM CHLORIDE, SODIUM LACTATE, POTASSIUM CHLORIDE, AND CALCIUM CHLORIDE 125 ML/HR: .6; .31; .03; .02 INJECTION, SOLUTION INTRAVENOUS at 03:30

## 2023-06-05 RX ADMIN — ROPIVACAINE HYDROCHLORIDE 2.5 ML: 2 INJECTION, SOLUTION EPIDURAL; INFILTRATION at 18:25

## 2023-06-05 RX ADMIN — Medication 2 MILLI-UNITS/MIN: at 05:38

## 2023-06-05 RX ADMIN — SODIUM CHLORIDE, SODIUM LACTATE, POTASSIUM CHLORIDE, AND CALCIUM CHLORIDE 125 ML/HR: .6; .31; .03; .02 INJECTION, SOLUTION INTRAVENOUS at 17:37

## 2023-06-05 RX ADMIN — ROPIVACAINE HYDROCHLORIDE: 2 INJECTION, SOLUTION EPIDURAL; INFILTRATION at 11:47

## 2023-06-05 RX ADMIN — ACETAMINOPHEN 650 MG: 325 TABLET ORAL at 23:52

## 2023-06-05 RX ADMIN — IBUPROFEN 600 MG: 600 TABLET ORAL at 21:52

## 2023-06-05 RX ADMIN — ROPIVACAINE HYDROCHLORIDE: 2 INJECTION, SOLUTION EPIDURAL; INFILTRATION at 19:00

## 2023-06-05 RX ADMIN — DOCUSATE SODIUM 100 MG: 100 CAPSULE, LIQUID FILLED ORAL at 21:52

## 2023-06-05 RX ADMIN — HYDROCORTISONE 1 APPLICATION.: 1 CREAM TOPICAL at 21:52

## 2023-06-05 RX ADMIN — LIDOCAINE HYDROCHLORIDE AND EPINEPHRINE 5 ML: 15; 5 INJECTION, SOLUTION EPIDURAL at 03:15

## 2023-06-05 RX ADMIN — CLONIDINE HYDROCHLORIDE 100 MCG: 0.1 INJECTION, SOLUTION EPIDURAL at 03:18

## 2023-06-05 RX ADMIN — Medication 1 APPLICATION.: at 21:52

## 2023-06-05 RX ADMIN — SODIUM CHLORIDE, SODIUM LACTATE, POTASSIUM CHLORIDE, AND CALCIUM CHLORIDE 125 ML/HR: .6; .31; .03; .02 INJECTION, SOLUTION INTRAVENOUS at 09:22

## 2023-06-05 RX ADMIN — BUPIVACAINE HYDROCHLORIDE 5 ML: 2.5 INJECTION, SOLUTION EPIDURAL; INFILTRATION; INTRACAUDAL; PERINEURAL at 03:19

## 2023-06-05 RX ADMIN — FENTANYL CITRATE 50 MCG: 50 INJECTION INTRAMUSCULAR; INTRAVENOUS at 18:25

## 2023-06-05 RX ADMIN — ROPIVACAINE HYDROCHLORIDE: 2 INJECTION, SOLUTION EPIDURAL; INFILTRATION at 03:31

## 2023-06-05 RX ADMIN — EPHEDRINE SULFATE 50 MG: 50 INJECTION, SOLUTION INTRAVENOUS at 03:20

## 2023-06-05 RX ADMIN — ROPIVACAINE HYDROCHLORIDE: 2 INJECTION, SOLUTION EPIDURAL; INFILTRATION at 18:39

## 2023-06-05 NOTE — PLAN OF CARE
Problem: PAIN - ADULT  Goal: Verbalizes/displays adequate comfort level or baseline comfort level  Description: Interventions:  - Encourage patient to monitor pain and request assistance  - Assess pain using appropriate pain scale  - Administer analgesics based on type and severity of pain and evaluate response  - Implement non-pharmacological measures as appropriate and evaluate response  - Consider cultural and social influences on pain and pain management  - Notify physician/advanced practitioner if interventions unsuccessful or patient reports new pain  6/5/2023 0827 by Hermelinda Glaser RN  Outcome: Progressing  6/5/2023 0826 by Hermelinda Glaser RN  Outcome: Progressing     Problem: INFECTION - ADULT  Goal: Absence or prevention of progression during hospitalization  Description: INTERVENTIONS:  - Assess and monitor for signs and symptoms of infection  - Monitor lab/diagnostic results  - Monitor all insertion sites, i e  indwelling lines, tubes, and drains  - Monitor endotracheal if appropriate and nasal secretions for changes in amount and color  - Bracey appropriate cooling/warming therapies per order  - Administer medications as ordered  - Instruct and encourage patient and family to use good hand hygiene technique  - Identify and instruct in appropriate isolation precautions for identified infection/condition  6/5/2023 0827 by Hermelinda Glaser RN  Outcome: Progressing  6/5/2023 0826 by Hermelinda Glaser RN  Outcome: Progressing  Goal: Absence of fever/infection during neutropenic period  Description: INTERVENTIONS:  - Monitor WBC    6/5/2023 0827 by Hermelinda Glaser RN  Outcome: Progressing  6/5/2023 0826 by Hermelinda Glaser RN  Outcome: Progressing     Problem: SAFETY ADULT  Goal: Patient will remain free of falls  Description: INTERVENTIONS:  - Educate patient/family on patient safety including physical limitations  - Instruct patient to call for assistance with activity   - Consult OT/PT to assist with strengthening/mobility - Keep Call bell within reach  - Keep bed low and locked with side rails adjusted as appropriate  - Keep care items and personal belongings within reach  - Initiate and maintain comfort rounds  6/5/2023 0827 by Delia Riley RN  Outcome: Progressing  6/5/2023 0826 by Delia Riley RN  Outcome: Progressing  Goal: Maintain or return to baseline ADL function  Description: INTERVENTIONS:  -  Assess patient's ability to carry out ADLs; assess patient's baseline for ADL function and identify physical deficits which impact ability to perform ADLs (bathing, care of mouth/teeth, toileting, grooming, dressing, etc )  - Assess/evaluate cause of self-care deficits   - Assess range of motion  - Assess patient's mobility; develop plan if impaired  - Assess patient's need for assistive devices and provide as appropriate  - Encourage maximum independence but intervene and supervise when necessary  - Involve family in performance of ADLs  - Assess for home care needs following discharge   - Consider OT consult to assist with ADL evaluation and planning for discharge  - Provide patient education as appropriate  6/5/2023 0827 by Delia Riley RN  Outcome: Progressing  6/5/2023 0826 by Delia Riley RN  Outcome: Progressing     Problem: Knowledge Deficit  Goal: Patient/family/caregiver demonstrates understanding of disease process, treatment plan, medications, and discharge instructions  Description: Complete learning assessment and assess knowledge base  Interventions:  - Provide teaching at level of understanding  - Provide teaching via preferred learning methods  6/5/2023 0827 by Delia Riley RN  Outcome: Progressing  6/5/2023 0826 by Delia Riley RN  Outcome: Progressing  Goal: Verbalizes understanding of labor plan  Description: Assess patient/family/caregiver's baseline knowledge level and ability to understand information    Provide education via patient/family/caregiver's preferred learning method at appropriate level of understanding  1  Provide teaching at level of understanding  2  Provide teaching via preferred learning method(s)    2023 by Ananya Lima RN  Outcome: Progressing  2023 by Ananya Lima RN  Outcome: Progressing     Problem: DISCHARGE PLANNING  Goal: Discharge to home or other facility with appropriate resources  Description: INTERVENTIONS:  - Identify barriers to discharge w/patient and caregiver  - Arrange for needed discharge resources and transportation as appropriate  - Identify discharge learning needs (meds, wound care, etc )  - Arrange for interpretive services to assist at discharge as needed  - Refer to Case Management Department for coordinating discharge planning if the patient needs post-hospital services based on physician/advanced practitioner order or complex needs related to functional status, cognitive ability, or social support system  2023 by Ananya Lima RN  Outcome: Progressing  2023 by Ananya Lima RN  Outcome: Progressing     Problem: BIRTH - VAGINAL/ SECTION  Goal: Fetal and maternal status remain reassuring during the birth process  Description: INTERVENTIONS:  - Monitor vital signs  - Monitor fetal heart rate  - Monitor uterine activity  - Monitor labor progression (vaginal delivery)  - DVT prophylaxis  - Antibiotic prophylaxis  2023 by Ananya Lima RN  Outcome: Progressing  2023 by Ananya Lima RN  Outcome: Progressing  Goal: Emotionally satisfying birthing experience for mother/fetus  Description: Interventions:  - Assess, plan, implement and evaluate the nursing care given to the patient in labor  - Advocate the philosophy that each childbirth experience is a unique experience and support the family's chosen level of involvement and control during the labor process   - Actively participate in both the patient's and family's teaching of the birth process  - Consider cultural, Moravian and age-specific factors and plan care for the patient in labor  6/5/2023 0827 by Brittany Salcedo RN  Outcome: Progressing  6/5/2023 0826 by Brittany Salcedo RN  Outcome: Progressing     Problem: Labor & Delivery  Goal: Manages discomfort  Description: Assess and monitor for signs and symptoms of discomfort  Assess patient's pain level regularly and per hospital policy  Administer medications as ordered  Support use of nonpharmacological methods to help control pain such as distraction, imagery, relaxation, and application of heat and cold  Collaborate with interdisciplinary team and patient to determine appropriate pain management plan  1  Include patient in decisions related to comfort  2  Offer non-pharmacological pain management interventions  3  Report ineffective pain management to physician  6/5/2023 0827 by Brittany Salcedo RN  Outcome: Progressing  6/5/2023 0826 by Brittany Salcedo RN  Outcome: Progressing  Goal: Patient vital signs are stable  Description: 1  Assess vital signs - vaginal delivery    6/5/2023 0827 by Brittany Salcedo RN  Outcome: Progressing  6/5/2023 0826 by Brittany Salcedo RN  Outcome: Progressing

## 2023-06-05 NOTE — PLAN OF CARE
Problem: BIRTH - VAGINAL/ SECTION  Goal: Fetal and maternal status remain reassuring during the birth process  Description: INTERVENTIONS:  - Monitor vital signs  - Monitor fetal heart rate  - Monitor uterine activity  - Monitor labor progression (vaginal delivery)  - DVT prophylaxis  - Antibiotic prophylaxis  2023 by Chilo Carnes RN  Outcome: Progressing  2023 by Chilo Carnes RN  Outcome: Progressing  2023 by Chilo Carnes RN  Outcome: Progressing     Problem: BIRTH - VAGINAL/ SECTION  Goal: Emotionally satisfying birthing experience for mother/fetus  Description: Interventions:  - Assess, plan, implement and evaluate the nursing care given to the patient in labor  - Advocate the philosophy that each childbirth experience is a unique experience and support the family's chosen level of involvement and control during the labor process   - Actively participate in both the patient's and family's teaching of the birth process  - Consider cultural, Worship and age-specific factors and plan care for the patient in labor  2023 by Chilo Carnes RN  Outcome: Progressing  2023 by Chilo Carnes RN  Outcome: Progressing  2023 by Chilo Carnes RN  Outcome: Progressing     Problem: Labor & Delivery  Goal: Manages discomfort  Description: Assess and monitor for signs and symptoms of discomfort  Assess patient's pain level regularly and per hospital policy  Administer medications as ordered  Support use of nonpharmacological methods to help control pain such as distraction, imagery, relaxation, and application of heat and cold  Collaborate with interdisciplinary team and patient to determine appropriate pain management plan  1  Include patient in decisions related to comfort  2  Offer non-pharmacological pain management interventions  3  Report ineffective pain management to physician    2023 by Chilo Carnes RN  Outcome: Progressing  2023 by Hermelinda Glaser, RN  Outcome: Progressing  6/5/2023 0826 by Hermelinda Glaser, RN  Outcome: Progressing

## 2023-06-05 NOTE — ANESTHESIA PROCEDURE NOTES
Epidural Block    Patient location during procedure: OB  Start time: 6/5/2023 3:12 AM  Reason for block: procedure for pain and at surgeon's request  Staffing  Performed by: Xavi Moore CRNA  Authorized by: Mundo Guerra MD    Preanesthetic Checklist  Completed: patient identified, IV checked, site marked, risks and benefits discussed, surgical consent, monitors and equipment checked, pre-op evaluation and timeout performed  Epidural  Patient position: sitting  Prep: ChloraPrep  Patient monitoring: cardiac monitor and frequent blood pressure checks  Approach: midline  Location: lumbar  Injection technique: ADITYA air  Needle  Needle type: Tuohy   Needle gauge: 18 G  Catheter type: side hole  Catheter size: 20 G  Catheter at skin depth: 12 cm  Catheter securement method: stabilization device  Test dose: negative  Assessment  Number of attempts: 2negative aspiration for CSF, negative aspiration for heme and no paresthesia on injection  patient tolerated the procedure well with no immediate complications  Additional Notes  CRNA attempt x 2  MD successful with first pass

## 2023-06-05 NOTE — ASSESSMENT & PLAN NOTE
Admit to OBGYN for eIOL  Clear liquid diet   F/u T&S, CBC, RPR   GBS negative  Status post Tdap, COVID vaccine x2 and flu vaccine  IVF LR 125cc/hr   Continuous fetal monitoring and tocometry   Analgesia at maternal request   Vertex by manual exam  Induction plan: PO Cytotec, she is hoping to avoid Ortiz balloon if possible

## 2023-06-05 NOTE — OB LABOR/OXYTOCIN SAFETY PROGRESS
Oxytocin Safety Progress Check Note - Scotty Charles 34 y o  female MRN: [id number]    Unit/Bed#: -01 Encounter: [id number]    Dose (blake-units/min) Oxytocin: 22 blake-units/min  Contraction Frequency (minutes): 2-3  Contraction Quality: Moderate  Tachysystole: No   Cervical Dilation: 5-6        Cervical Effacement: 70  Fetal Station: -3  Baseline Rate: 145 bpm  Fetal Heart Rate: 145 BPM  FHR Category: Category I               Vital Signs:   Vitals:    06/05/23 1530   BP: 105/50   Pulse: 92   Resp:    Temp:    SpO2:        Notes/comments: SVE as above  FHT reactive  Continue to monitor           Mamta Loera MD 6/5/2023 4:35 PM

## 2023-06-05 NOTE — OB LABOR/OXYTOCIN SAFETY PROGRESS
Oxytocin Safety Progress Check Note - Osmin Perry 34 y o  female MRN: [id number]    Unit/Bed#: -01 Encounter: [id number]    Dose (blake-units/min) Oxytocin: 20 blake-units/min  Contraction Frequency (minutes): 1 5-3 5  Contraction Quality: Moderate  Tachysystole: No   Cervical Dilation: 5        Cervical Effacement: 70  Fetal Station: -3  Baseline Rate: 140 bpm  Fetal Heart Rate: 140 BPM  FHR Category: Category I               Vital Signs:   Vitals:    06/05/23 1315   BP: 118/59   Pulse: 83   Resp:    Temp:    SpO2:        Notes/comments: SVE as above  Amniotomy performed for clear/bloody fluid  FHT Cat I  Continue to monitor           Luz Elena Wiseman MD 6/5/2023 1:34 PM

## 2023-06-05 NOTE — ANESTHESIA PREPROCEDURE EVALUATION
"Procedure:  LABOR ANALGESIA    Relevant Problems   ANESTHESIA (within normal limits)      CARDIO (within normal limits)      ENDO (within normal limits)      GI/HEPATIC (within normal limits)      /RENAL (within normal limits)      GYN   (+) 40 weeks gestation of pregnancy      HEMATOLOGY (within normal limits)      MUSCULOSKELETAL (within normal limits)      NEURO/PSYCH (within normal limits)      PULMONARY (within normal limits)      Other   (+) History of benign breast tumor   (+) Pregnancy, obstetrical care, third trimester        Physical Exam    Airway    Mallampati score: II  TM Distance: >3 FB  Neck ROM: full     Dental   No notable dental hx     Cardiovascular  Rhythm: regular, Rate: normal, Cardiovascular exam normal    Pulmonary  Pulmonary exam normal Breath sounds clear to auscultation,     Other Findings        Anesthesia Plan  ASA Score- 2     Anesthesia Type- epidural with ASA Monitors  Additional Monitors:   Airway Plan:           Plan Factors-Exercise tolerance (METS): >4 METS  Chart reviewed  EKG reviewed  Imaging results reviewed  Patient summary reviewed  Induction-     Postoperative Plan-     Informed Consent- Anesthetic plan and risks discussed with patient  I personally reviewed this patient with the CRNA  Discussed and agreed on the Anesthesia Plan with the CRNA  Lyndsay Aldana Recent labs personally reviewed:  Lab Results   Component Value Date    HGB 12 9 06/04/2023     06/04/2023    WBC 14 97 (H) 06/04/2023     No results found for: \"BUN\", \"CREATININE\", \"GLUCOSE\", \"K\", \"NA\"  No results found for: \"PTT\"   No results found for: \"INR\"    Blood type O    Lab Results   Component Value Date    HGBA1C 5 4 07/16/2019       I, Yusef Abarca MD, have personally seen and evaluated the patient prior to anesthetic care  I have reviewed the pre-anesthetic record, and other medical records if appropriate to the anesthetic care    If a CRNA is involved in the case, I have " reviewed the CRNA assessment, if present, and agree  Risks/benefits and alternatives discussed with patient including possible PONV, sore throat, and possibility of rare anesthetic and surgical emergencies

## 2023-06-05 NOTE — PLAN OF CARE
Problem: PAIN - ADULT  Goal: Verbalizes/displays adequate comfort level or baseline comfort level  Description: Interventions:  - Encourage patient to monitor pain and request assistance  - Assess pain using appropriate pain scale  - Administer analgesics based on type and severity of pain and evaluate response  - Implement non-pharmacological measures as appropriate and evaluate response  - Consider cultural and social influences on pain and pain management  - Notify physician/advanced practitioner if interventions unsuccessful or patient reports new pain  Outcome: Progressing     Problem: INFECTION - ADULT  Goal: Absence or prevention of progression during hospitalization  Description: INTERVENTIONS:  - Assess and monitor for signs and symptoms of infection  - Monitor lab/diagnostic results  - Monitor all insertion sites, i e  indwelling lines, tubes, and drains  - Monitor endotracheal if appropriate and nasal secretions for changes in amount and color  - [place] appropriate cooling/warming therapies per order  - Administer medications as ordered  - Instruct and encourage patient and family to use good hand hygiene technique  - Identify and instruct in appropriate isolation precautions for identified infection/condition  Outcome: Progressing  Goal: Absence of fever/infection during neutropenic period  Description: INTERVENTIONS:  - Monitor WBC    Outcome: Progressing     Problem: SAFETY ADULT  Goal: Patient will remain free of falls  Description: INTERVENTIONS:  - Educate patient/family on patient safety including physical limitations  - Instruct patient to call for assistance with activity   - Consult OT/PT to assist with strengthening/mobility   - Keep Call bell within reach  - Keep bed low and locked with side rails adjusted as appropriate  - Keep care items and personal belongings within reach  - Initiate and maintain comfort rounds  - Make Fall Risk Sign visible to staff  - Apply yellow socks and bracelet for high fall risk patients  - Consider moving patient to room near nurses station  Outcome: Progressing  Goal: Maintain or return to baseline ADL function  Description: INTERVENTIONS:  -  Assess patient's ability to carry out ADLs; assess patient's baseline for ADL function and identify physical deficits which impact ability to perform ADLs (bathing, care of mouth/teeth, toileting, grooming, dressing, etc )  - Assess/evaluate cause of self-care deficits   - Assess range of motion  - Assess patient's mobility; develop plan if impaired  - Assess patient's need for assistive devices and provide as appropriate  - Encourage maximum independence but intervene and supervise when necessary  - Involve family in performance of ADLs  - Assess for home care needs following discharge   - Consider OT consult to assist with ADL evaluation and planning for discharge  - Provide patient education as appropriate  Outcome: Progressing  Goal: Maintains/Returns to pre admission functional level  Description: INTERVENTIONS:  - Perform BMAT or MOVE assessment daily    - Set and communicate daily mobility goal to care team and patient/family/caregiver  - Collaborate with rehabilitation services on mobility goals if consulted  - Out of bed for toileting  - Record patient progress and toleration of activity level   Outcome: Progressing     Problem: Knowledge Deficit  Goal: Patient/family/caregiver demonstrates understanding of disease process, treatment plan, medications, and discharge instructions  Description: Complete learning assessment and assess knowledge base  Interventions:  - Provide teaching at level of understanding  - Provide teaching via preferred learning methods  Outcome: Progressing  Goal: Verbalizes understanding of labor plan  Description: Assess patient/family/caregiver's baseline knowledge level and ability to understand information    Provide education via patient/family/caregiver's preferred learning method at appropriate level of understanding  1  Provide teaching at level of understanding  2  Provide teaching via preferred learning method(s)  Outcome: Progressing     Problem: DISCHARGE PLANNING  Goal: Discharge to home or other facility with appropriate resources  Description: INTERVENTIONS:  - Identify barriers to discharge w/patient and caregiver  - Arrange for needed discharge resources and transportation as appropriate  - Identify discharge learning needs (meds, wound care, etc )  - Arrange for interpretive services to assist at discharge as needed  - Refer to Case Management Department for coordinating discharge planning if the patient needs post-hospital services based on physician/advanced practitioner order or complex needs related to functional status, cognitive ability, or social support system  Outcome: Progressing     Problem: BIRTH - VAGINAL/ SECTION  Goal: Fetal and maternal status remain reassuring during the birth process  Description: INTERVENTIONS:  - Monitor vital signs  - Monitor fetal heart rate  - Monitor uterine activity  - Monitor labor progression (vaginal delivery)  - DVT prophylaxis  - Antibiotic prophylaxis  Outcome: Progressing  Goal: Emotionally satisfying birthing experience for mother/fetus  Description: Interventions:  - Assess, plan, implement and evaluate the nursing care given to the patient in labor  - Advocate the philosophy that each childbirth experience is a unique experience and support the family's chosen level of involvement and control during the labor process   - Actively participate in both the patient's and family's teaching of the birth process  - Consider cultural, [employer] and age-specific factors and plan care for the patient in labor  Outcome: Progressing     Problem: Labor & Delivery  Goal: Manages discomfort  Description: Assess and monitor for signs and symptoms of discomfort  Assess patient's pain level regularly and per hospital policy  Administer medications as ordered  Support use of nonpharmacological methods to help control pain such as distraction, imagery, relaxation, and application of heat and cold  Collaborate with interdisciplinary team and patient to determine appropriate pain management plan  1  Include patient in decisions related to comfort  2  Offer non-pharmacological pain management interventions  3  Report ineffective pain management to physician  Outcome: Progressing  Goal: Patient vital signs are stable  Description: 1  Assess vital signs - vaginal delivery    Outcome: Progressing

## 2023-06-05 NOTE — H&P
H & P- Obstetrics   Niesha Newsome 34 y o  female MRN: [id number]  Unit/Bed#: -01 Encounter: [id number]      Assessment/Plan:    34 y o  [id number] at [address] [place] [address] admitted for an elective  induction of labor    SVE: Cervical Dilation: 1  Cervical Effacement: 10  Cervical Consistency: Soft  Fetal Station: -3  Presentation: Vertex  Method: Manual  OB Examiner: Alyssa    * 40 weeks gestation of pregnancy  Assessment & Plan  Admit to OBGYN for eIOL  Clear liquid diet   F/u T&S, CBC, RPR   GBS negative  Status post Tdap, COVID vaccine x2 and flu vaccine  IVF LR 125cc/hr   Continuous fetal monitoring and tocometry   Analgesia at maternal request   Vertex by manual exam  Induction plan: PO Cytotec, she is hoping to avoid Ortiz balloon if possible                 Patient of: Azael Soler  This patient will be an INPATIENT  and I certify the anticipated length of stay is >2 Midnights  Discussed with Dr Deboraha Seip:    Chief Complaint: I am here for my induction    HPI: Niesha Newsome is a 34 y o  [id number] with an CAT of 2023, by Last Menstrual Period at 40w3d who is being admitted for induction of labor  She denies contractions, has no loss of fluid, and reports good fetal movement  This pregnancy is uncomplicated  Pregnancy Plan:  Pregnancy: Jeny Santater  Support person: Maurice     Delivery Plans  Planned delivery location: [facility]  Acceptable blood products:  All     Post-Delivery Plans  Feeding intentions: Non-human milk substitute      Patient Active Problem List   Diagnosis   • History of benign breast tumor   • Eczema   • 40 weeks gestation of pregnancy   • Pregnancy, obstetrical care, third trimester       OB History    Para Term  AB Living   4 1 1 0 2 1   SAB IAB Ectopic Multiple Live Births   1 0 0 0 1      # Outcome Date GA Lbr Virgilio/2nd Weight Sex Delivery Anes PTL Lv   4 Current            3 Term 21 41w1d / 01:32 3310 g (7 lb 4 8 oz) F Vag-Spont EPI N LAMAR   2 AB  8w0d          1 2011              Obstetric Comments   Medical AB       Past Medical History:   Diagnosis Date   • Chlamydia infection    • Eczema        Past Surgical History:   Procedure Laterality Date   • BREAST SURGERY Left 2008    benign mass age 14    • INDUCED       [facility], 10 years ago   • WISDOM TOOTH EXTRACTION         Social History     Tobacco Use   • Smoking status: Former     Packs/day: 1 00     Years: 3 00     Total pack years: 3 00     Types: Cigarettes     Start date:      Quit date: 2014     Years since quittin 4   • Smokeless tobacco: Former   Substance Use Topics   • Alcohol use: Not Currently     Comment: social        No Known Allergies    Medications Prior to Admission   Medication   • Prenatal MV-Min-Fe Fum-FA-DHA (PRENATAL+DHA PO)           OBJECTIVE:  Vitals:  Temp:  [97 8 °F (36 6 °C)] 97 8 °F (36 6 °C)  HR:  [] 88  Resp:  [18] 18  BP: (110-128)/(60-81) 110/60  There is no height or weight on file to calculate BMI  Physical Exam:  General: Well appearing, no distress  Respiratory: Unlabored breathing  Cardiovascular: Regular rate  Abdomen: Soft, gravid, nontender  Fundal Height: Appropriate for gestational age  Extremities: Warm and well perfused  Non tender    Psychiatric: Behavioral normal        FHT:  Baseline Rate: 130 bpm  Variability: Moderate 6-25 bpm  Accelerations: 15 x 15 or greater, At variable times  Decelerations: None  FHR Category: Category I    TOCO:   Contraction Frequency (minutes): none  Contraction Duration (seconds): 60-70  Contraction Quality: Not applicable      Prenatal Labs:   Blood Type:   Lab Results   Component Value Date/Time    ABO Grouping O 2023 08:28 PM     , D (Rh type):   Lab Results   Component Value Date/Time    Rh Factor Positive 2023 08:28 PM     , HCT/HGB:   Lab Results   Component Value Date/Time    Hematocrit 39 0 2023 08:28 PM    Hemoglobin 12 9 2023 08:28 PM      , "Platelets:   Lab Results   Component Value Date/Time    Platelets 576 96/37/1896 08:28 PM      , 1 hour Glucola:   Lab Results   Component Value Date/Time    Glucose 101 03/08/2023 04:45 PM   , Rubella:   Lab Results   Component Value Date/Time    Rubella IgG Quant 80 8 11/22/2022 03:03 PM        , VDRL/RPR:   Lab Results   Component Value Date/Time    RPR Non-Reactive 11/22/2022 03:03 PM      , Hep B:   Lab Results   Component Value Date/Time    Hepatitis B Surface Ag Non-reactive 11/22/2022 03:03 PM     , HIV:   Lab Results   Component Value Date/Time    HIV-1/HIV-2 Ab Non-Reactive 11/22/2022 03:03 PM     , Group B Strep:    Lab Results   Component Value Date/Time    Strep Grp B PCR Negative 05/08/2023 04:24 PM            Santa Rahman DO  OB/GYN PGY-3  6/4/2023  11:42 PM        Portions of the record may have been created with voice recognition software  Occasional wrong word or \"sound a like\" substitutions may have occurred due to the inherent limitations of voice recognition software    Read the chart carefully and recognize, using context, where substitutions have occurred    "

## 2023-06-05 NOTE — PLAN OF CARE
Problem: PAIN - ADULT  Goal: Verbalizes/displays adequate comfort level or baseline comfort level  Description: Interventions:  - Encourage patient to monitor pain and request assistance  - Assess pain using appropriate pain scale  - Administer analgesics based on type and severity of pain and evaluate response  - Implement non-pharmacological measures as appropriate and evaluate response  - Consider cultural and social influences on pain and pain management  - Notify physician/advanced practitioner if interventions unsuccessful or patient reports new pain  Outcome: Progressing     Problem: INFECTION - ADULT  Goal: Absence or prevention of progression during hospitalization  Description: INTERVENTIONS:  - Assess and monitor for signs and symptoms of infection  - Monitor lab/diagnostic results  - Monitor all insertion sites, i e  indwelling lines, tubes, and drains  - Monitor endotracheal if appropriate and nasal secretions for changes in amount and color  - [place] appropriate cooling/warming therapies per order  - Administer medications as ordered  - Instruct and encourage patient and family to use good hand hygiene technique  - Identify and instruct in appropriate isolation precautions for identified infection/condition  Outcome: Progressing  Goal: Absence of fever/infection during neutropenic period  Description: INTERVENTIONS:  - Monitor WBC    Outcome: Progressing     Problem: SAFETY ADULT  Goal: Patient will remain free of falls  Description: INTERVENTIONS:  - Educate patient/family on patient safety including physical limitations  - Instruct patient to call for assistance with activity   - Consult OT/PT to assist with strengthening/mobility   - Keep Call bell within reach  - Keep bed low and locked with side rails adjusted as appropriate  - Keep care items and personal belongings within reach  - Initiate and maintain comfort rounds  - Make Fall Risk Sign visible to staff  - Obtain necessary fall risk management equipment  - Apply yellow socks and bracelet for high fall risk patients  - Consider moving patient to room near nurses station  Outcome: Progressing  Goal: Maintain or return to baseline ADL function  Description: INTERVENTIONS:  -  Assess patient's ability to carry out ADLs; assess patient's baseline for ADL function and identify physical deficits which impact ability to perform ADLs (bathing, care of mouth/teeth, toileting, grooming, dressing, etc )  - Assess/evaluate cause of self-care deficits   - Assess range of motion  - Assess patient's mobility; develop plan if impaired  - Assess patient's need for assistive devices and provide as appropriate  - Encourage maximum independence but intervene and supervise when necessary  - Involve family in performance of ADLs  - Assess for home care needs following discharge   - Consider OT consult to assist with ADL evaluation and planning for discharge  - Provide patient education as appropriate  Outcome: Progressing  Goal: Maintains/Returns to pre admission functional level  Description: INTERVENTIONS:  - Perform BMAT or MOVE assessment daily    - Set and communicate daily mobility goal to care team and patient/family/caregiver  - Collaborate with rehabilitation services on mobility goals if consulted  - Out of bed for toileting  - Record patient progress and toleration of activity level   Outcome: Progressing     Problem: Knowledge Deficit  Goal: Patient/family/caregiver demonstrates understanding of disease process, treatment plan, medications, and discharge instructions  Description: Complete learning assessment and assess knowledge base  Interventions:  - Provide teaching at level of understanding  - Provide teaching via preferred learning methods  Outcome: Progressing  Goal: Verbalizes understanding of labor plan  Description: Assess patient/family/caregiver's baseline knowledge level and ability to understand information    Provide education via patient/family/caregiver's preferred learning method at appropriate level of understanding  1  Provide teaching at level of understanding  2  Provide teaching via preferred learning method(s)  Outcome: Progressing     Problem: DISCHARGE PLANNING  Goal: Discharge to home or other facility with appropriate resources  Description: INTERVENTIONS:  - Identify barriers to discharge w/patient and caregiver  - Arrange for needed discharge resources and transportation as appropriate  - Identify discharge learning needs (meds, wound care, etc )  - Arrange for interpretive services to assist at discharge as needed  - Refer to Case Management Department for coordinating discharge planning if the patient needs post-hospital services based on physician/advanced practitioner order or complex needs related to functional status, cognitive ability, or social support system  Outcome: Progressing     Problem: BIRTH - VAGINAL/ SECTION  Goal: Fetal and maternal status remain reassuring during the birth process  Description: INTERVENTIONS:  - Monitor vital signs  - Monitor fetal heart rate  - Monitor uterine activity  - Monitor labor progression (vaginal delivery)  - DVT prophylaxis  - Antibiotic prophylaxis  Outcome: Progressing  Goal: Emotionally satisfying birthing experience for mother/fetus  Description: Interventions:  - Assess, plan, implement and evaluate the nursing care given to the patient in labor  - Advocate the philosophy that each childbirth experience is a unique experience and support the family's chosen level of involvement and control during the labor process   - Actively participate in both the patient's and family's teaching of the birth process  - Consider cultural, [employer] and age-specific factors and plan care for the patient in labor  Outcome: Progressing     Problem: Labor & Delivery  Goal: Manages discomfort  Description: Assess and monitor for signs and symptoms of discomfort    Assess patient's pain level regularly and per hospital policy  Administer medications as ordered  Support use of nonpharmacological methods to help control pain such as distraction, imagery, relaxation, and application of heat and cold  Collaborate with interdisciplinary team and patient to determine appropriate pain management plan  1  Include patient in decisions related to comfort  2  Offer non-pharmacological pain management interventions  3  Report ineffective pain management to physician  Outcome: Progressing  Goal: Patient vital signs are stable  Description: 1  Assess vital signs - vaginal delivery    Outcome: Progressing

## 2023-06-05 NOTE — OB LABOR/OXYTOCIN SAFETY PROGRESS
Oxytocin Safety Progress Check Note - Teja Morris 34 y o  female MRN: [id number]    Unit/Bed#: -01 Encounter: [id number]    Dose (blake-units/min) Oxytocin: 24 blake-units/min  Contraction Frequency (minutes): 2-4  Contraction Quality: Moderate  Tachysystole: No   Cervical Dilation: 6        Cervical Effacement: 80  Fetal Station: -2  Baseline Rate: 140 bpm  Fetal Heart Rate: 141 BPM  FHR Category: Category I               Vital Signs:   Vitals:    06/05/23 1800   BP: 110/60   Pulse: (!) 109   Resp:    Temp:    SpO2:        Notes/comments:   SVE as above  Starting to transition to active labor  MVUs are adequate on pit of 24   Dr Pam Shah DO 6/5/2023 6:22 PM

## 2023-06-05 NOTE — OB LABOR/OXYTOCIN SAFETY PROGRESS
Oxytocin Safety Progress Check Note - Gregorio Hope 34 y o  female MRN: [id number]    Unit/Bed#: -01 Encounter: [id number]    Dose (blake-units/min) Oxytocin: 14 blake-units/min  Contraction Frequency (minutes): 2 5-4  Contraction Quality: Moderate  Tachysystole: No   Cervical Dilation: 5        Cervical Effacement: 60  Fetal Station: -3  Baseline Rate: 155 bpm  Fetal Heart Rate: 153 BPM  FHR Category: Category I               Vital Signs:   Vitals:    06/05/23 1045   BP: 107/65   Pulse: 90   Resp:    Temp:    SpO2:        Notes/comments: SVE unchanged  FHT Cat I  Continue to monitor           Manuela Dubin, MD 6/5/2023 10:54 AM

## 2023-06-05 NOTE — OB LABOR/OXYTOCIN SAFETY PROGRESS
Oxytocin Safety Progress Check Note - Christopher Ty 34 y o  female MRN: [id number]    Unit/Bed#: -01 Encounter: [id number]    Dose (blake-units/min) Oxytocin: 4 blake-units/min  Contraction Frequency (minutes): 2-6  Contraction Quality: Mild  Tachysystole: No   Cervical Dilation: 1-2        Cervical Effacement: 50  Fetal Station: -3  Baseline Rate: 150 bpm  Fetal Heart Rate: 148 BPM  FHR Category: Category I               Vital Signs:   Vitals:    06/05/23 0730   BP: 111/64   Pulse: 98   Resp:    Temp:    SpO2:        Notes/comments: Patient experienced moderate amount of bleeding with newman balloon placement, newman currently weighted  Bleeding appears normal  Pitocin on 4mu  FHT reactive  Continue to monitor           Cher Cardona MD 6/5/2023 8:00 AM

## 2023-06-05 NOTE — PLAN OF CARE
Problem: PAIN - ADULT  Goal: Verbalizes/displays adequate comfort level or baseline comfort level  Description: Interventions:  - Encourage patient to monitor pain and request assistance  - Assess pain using appropriate pain scale  - Administer analgesics based on type and severity of pain and evaluate response  - Implement non-pharmacological measures as appropriate and evaluate response  - Consider cultural and social influences on pain and pain management  - Notify physician/advanced practitioner if interventions unsuccessful or patient reports new pain  Outcome: Progressing     Problem: INFECTION - ADULT  Goal: Absence or prevention of progression during hospitalization  Description: INTERVENTIONS:  - Assess and monitor for signs and symptoms of infection  - Monitor lab/diagnostic results  - Monitor all insertion sites, i e  indwelling lines, tubes, and drains  - Monitor endotracheal if appropriate and nasal secretions for changes in amount and color  - [place] appropriate cooling/warming therapies per order  - Administer medications as ordered  - Instruct and encourage patient and family to use good hand hygiene technique  - Identify and instruct in appropriate isolation precautions for identified infection/condition  Outcome: Progressing  Goal: Absence of fever/infection during neutropenic period  Description: INTERVENTIONS:  - Monitor WBC    Outcome: Progressing     Problem: SAFETY ADULT  Goal: Patient will remain free of falls  Description: INTERVENTIONS:  - Educate patient/family on patient safety including physical limitations  - Instruct patient to call for assistance with activity   - Consult OT/PT to assist with strengthening/mobility   - Keep Call bell within reach  - Keep bed low and locked with side rails adjusted as appropriate  - Keep care items and personal belongings within reach  - Initiate and maintain comfort rounds  Outcome: Progressing  Goal: Maintain or return to baseline ADL function  Description: INTERVENTIONS:  -  Assess patient's ability to carry out ADLs; assess patient's baseline for ADL function and identify physical deficits which impact ability to perform ADLs (bathing, care of mouth/teeth, toileting, grooming, dressing, etc )  - Assess/evaluate cause of self-care deficits   - Assess range of motion  - Assess patient's mobility; develop plan if impaired  - Assess patient's need for assistive devices and provide as appropriate  - Encourage maximum independence but intervene and supervise when necessary  - Involve family in performance of ADLs  - Assess for home care needs following discharge   - Consider OT consult to assist with ADL evaluation and planning for discharge  - Provide patient education as appropriate  Outcome: Progressing    Problem: Knowledge Deficit  Goal: Patient/family/caregiver demonstrates understanding of disease process, treatment plan, medications, and discharge instructions  Description: Complete learning assessment and assess knowledge base  Interventions:  - Provide teaching at level of understanding  - Provide teaching via preferred learning methods  Outcome: Progressing  Goal: Verbalizes understanding of labor plan  Description: Assess patient/family/caregiver's baseline knowledge level and ability to understand information  Provide education via patient/family/caregiver's preferred learning method at appropriate level of understanding  1  Provide teaching at level of understanding  2  Provide teaching via preferred learning method(s)    Outcome: Progressing     Problem: DISCHARGE PLANNING  Goal: Discharge to home or other facility with appropriate resources  Description: INTERVENTIONS:  - Identify barriers to discharge w/patient and caregiver  - Arrange for needed discharge resources and transportation as appropriate  - Identify discharge learning needs (meds, wound care, etc )  - Arrange for interpretive services to assist at discharge as needed  - Refer to Case Management Department for coordinating discharge planning if the patient needs post-hospital services based on physician/advanced practitioner order or complex needs related to functional status, cognitive ability, or social support system  Outcome: Progressing     Problem: BIRTH - VAGINAL/ SECTION  Goal: Fetal and maternal status remain reassuring during the birth process  Description: INTERVENTIONS:  - Monitor vital signs  - Monitor fetal heart rate  - Monitor uterine activity  - Monitor labor progression (vaginal delivery)  - DVT prophylaxis  - Antibiotic prophylaxis  Outcome: Progressing  Goal: Emotionally satisfying birthing experience for mother/fetus  Description: Interventions:  - Assess, plan, implement and evaluate the nursing care given to the patient in labor  - Advocate the philosophy that each childbirth experience is a unique experience and support the family's chosen level of involvement and control during the labor process   - Actively participate in both the patient's and family's teaching of the birth process  - Consider cultural, Scientology and age-specific factors and plan care for the patient in labor  Outcome: Progressing     Problem: Labor & Delivery  Goal: Manages discomfort  Description: Assess and monitor for signs and symptoms of discomfort  Assess patient's pain level regularly and per hospital policy  Administer medications as ordered  Support use of nonpharmacological methods to help control pain such as distraction, imagery, relaxation, and application of heat and cold  Collaborate with interdisciplinary team and patient to determine appropriate pain management plan  1  Include patient in decisions related to comfort  2  Offer non-pharmacological pain management interventions  3  Report ineffective pain management to physician  Outcome: Progressing  Goal: Patient vital signs are stable  Description: 1   Assess vital signs - vaginal delivery    Outcome: Progressing

## 2023-06-05 NOTE — OB LABOR/OXYTOCIN SAFETY PROGRESS
Labor Progress Note Patrick Ramirez 34 y o  female MRN: [id number]    Unit/Bed#: -01 Encounter: [id number]       Contraction Frequency (minutes): irregular  Contraction Quality: Mild  Tachysystole: No   Cervical Dilation: 1        Cervical Effacement: 10  Fetal Station: -3  Baseline Rate: 140 bpm  Fetal Heart Rate: 130 BPM  FHR Category: Category I               Vital Signs:   Vitals:    06/04/23 2239   BP: 110/60   Pulse: 88   Resp:    Temp:        Notes/comments:   Cervix is unchanged, I recommended Ortiz balloon placement  She would like an epidural before Ortiz    We will then do Ortiz/Pitocin when she is comfortable with her epidural         Reyna Conrad DO 6/5/2023 2:07 AM

## 2023-06-05 NOTE — OB LABOR/OXYTOCIN SAFETY PROGRESS
Oxytocin Safety Progress Check Note - Ranelle Forth 34 y o  female MRN: [id number]    Unit/Bed#: -01 Encounter: [id number]    Dose (blake-units/min) Oxytocin: 6 blake-units/min  Contraction Frequency (minutes): 3-4  Contraction Quality: Mild, Moderate  Tachysystole: No   Cervical Dilation: 5        Cervical Effacement: 60  Fetal Station: -3  Baseline Rate: 145 bpm  Fetal Heart Rate: 142 BPM  FHR Category: Category I               Vital Signs:   Vitals:    06/05/23 0830   BP: 120/70   Pulse: 90   Resp:    Temp:    SpO2:        Notes/comments: SVE as above  FHT Cat I  Continue to monitor  Continue pit titration           Dea Rivera MD 6/5/2023 8:41 AM

## 2023-06-05 NOTE — OB LABOR/OXYTOCIN SAFETY PROGRESS
Labor Progress Note Gweneth Mill 34 y o  female MRN: [id number]    Unit/Bed#:  205-01 Encounter: [id number]       Contraction Frequency (minutes): irregular  Contraction Quality: Mild  Tachysystole: No   Cervical Dilation: 1        Cervical Effacement: 50  Fetal Station: -3  Baseline Rate: 150 bpm  Fetal Heart Rate: 158 BPM  FHR Category: Category I               Vital Signs:   Vitals:    06/05/23 0359   BP: 109/65   Pulse: (!) 110   Resp:    Temp:    SpO2:        Notes/comments:   SVE as above  Tiffanie Montgomery is feeling more comfortable after receiving her epidural  Fetal heart tracing remains category 1      PROCEDURE:  MCNAMARA BALLOON PLACEMENT    A 24F mcnamara with a 30cc balloon was selected, SVE was performed and cervix was located, mcnamara was introduced over sterile gloved hands  Balloon advanced through cervix beyond the internal cervical os  A small amount of sterile saline solution was instilled in the balloon to confirm placement  Placement was confirmed to be beyond the internal cervical os  A total of 60cc of sterile saline solution was placed into the balloon  Pt tolerated well with epidural for analgesia  Blood noted within Mcnamara balloon tube after placement and inflation of balloon, clamped with umbilical cord clamp  Membranes intact  Instructions left with RN to place mcnamara to gravity with a 1L bag of IV fluid  Notify MD/ when mcnamara dislodged      Plan for Pitocin titration    DO Bladimir Henry DO 6/5/2023 4:32 AM

## 2023-06-06 PROCEDURE — 99024 POSTOP FOLLOW-UP VISIT: CPT | Performed by: OBSTETRICS & GYNECOLOGY

## 2023-06-06 RX ADMIN — SIMETHICONE 80 MG: 80 TABLET, CHEWABLE ORAL at 08:14

## 2023-06-06 RX ADMIN — ACETAMINOPHEN 650 MG: 325 TABLET ORAL at 22:20

## 2023-06-06 RX ADMIN — IBUPROFEN 600 MG: 600 TABLET ORAL at 15:19

## 2023-06-06 RX ADMIN — IBUPROFEN 600 MG: 600 TABLET ORAL at 10:05

## 2023-06-06 RX ADMIN — ACETAMINOPHEN 650 MG: 325 TABLET ORAL at 10:28

## 2023-06-06 RX ADMIN — ACETAMINOPHEN 650 MG: 325 TABLET ORAL at 15:19

## 2023-06-06 RX ADMIN — IBUPROFEN 600 MG: 600 TABLET ORAL at 04:19

## 2023-06-06 RX ADMIN — DOCUSATE SODIUM 100 MG: 100 CAPSULE, LIQUID FILLED ORAL at 18:22

## 2023-06-06 RX ADMIN — DOCUSATE SODIUM 100 MG: 100 CAPSULE, LIQUID FILLED ORAL at 08:07

## 2023-06-06 RX ADMIN — ACETAMINOPHEN 650 MG: 325 TABLET ORAL at 06:01

## 2023-06-06 RX ADMIN — IBUPROFEN 600 MG: 600 TABLET ORAL at 22:20

## 2023-06-06 NOTE — L&D DELIVERY NOTE
Vaginal Delivery Summary - OB/GYN   Gregorio Hope 34 y o  female MRN: [id number]  Unit/Bed#: - Encounter: [id number]    Pre-delivery Diagnosis:   Pregnancy at 40 weeks 4 days  History of benign breast tumor    Post-delivery Diagnosis: same, delivered    Procedure: Spontaneous Vaginal Delivery     Attending Physician: Dr Rahul Carlos  Resident Physician: Dr Kyle Santos    Anesthesia: Epidural    QBL: [id number]    Complications: none apparent    Specimens:   1  Arterial and venous cord gases  2  Cord blood  3  Segment of umbilical cord  4  Placenta to storage     Findings:  1  Viable male on 2023 at , with APGARS of 9 and 9 at 1 and 5 minutes respectively,  2  Spontaneous delivery of intact placenta  3  2 degree laceration repaired with 3-0 Vicryl Rapide    Gases:  Umbilical Cord Venous Blood Gas:  Results from last 7 days   Lab Units 23   BASE EXC COV mmol/L -2 1*   HCO3 COV mmol/L 22 4   O2 CT CD VB mL/dL 12 1   O2 HGB, VENOUS CORD % 51 4   PCO2 COV mm HG 38 0   PH COV  1 337     Umbilical Cord Arterial Blood Gas:  Results from last 7 days   Lab Units 23   BASE EXC COA mmol/L -5 3*   HCO3 COA mmol/L 21 4   O2 CONTENT CORD ART ml/dl 8 3   O2 HGB, ARTERIAL CORD % 33 7   PCO2 COA  45 6   PH COA  7 290   PO2 COA mm HG 17 0       Brief history and labor course:  Gregorio Hope is a 34 y o  [id number] 40w4d   She presented to labor and delivery for an elective induction of labor  She was induced with Cytotec followed by a epidural placement and Ortiz balloon with Pitocin  Amniotomy was performed for clear fluid  She progressed to full cervical dilation and began pushing  Description of delivery:    Patient delivered a viable male , wt pending as mother is doing skin to skin bonding  The fetal vertex delivered direct OA position spontaneously  There was no nuchal cord   The right anterior shoulder delivered atraumatically with maternal expulsive forces and the assistance of gentle downward traction  The left posterior shoulder delivered with maternal expulsive forces and the assistance of gentle upward traction  The remainder of the fetus delivered spontaneously  Upon delivery, the infant was placed on the mothers abdomen and the cord was doubly clamped and cut  Delayed cord clamping was achieved  The infant was noted to cry spontaneously and was moving all extremities appropriately  There was no evidence for injury  Awaiting nurse resuscitators evaluated the   Arterial and venous cord blood gases and cord blood was collected for analysis  These were promptly sent to the lab  In the immediate post-partum, active management of the 3rd stage of labor was performed with massage, the administration of 30 units of IV pitocin, and gentle traction on the umbilical cord  The placenta delivered spontaneously and was noted to have a centrally inserted 3 vessel cord  The placenta was sent to storage     Laceration Repair  The vagina, cervix, perineum, and rectum were inspected and there was noted to be a second-degree laceration  The patient was comfortable with epidural for analgesia  The vaginal mucosa and submucosa were then re approximated using 3-0 vicryl rapide to the point of the hymenal ring  Interrupted 3-0 vicryl was used to re approximate the muscle and fascia  The superficial perineal fascia was then re approximated using 3-0 vicryl in a running non locked stitch downward followed by a running subcuticular stitch upward to the level of the introitus  At the conclusion of the procedure, all needle, sponge, and instrument counts were noted to be correct  Dr Lee Locke was present and participated in all key portions of the case  Disposition:  The patient and the  both tolerated the procedure well and are recovering in labor and delivery room       Smitha Morales 92, [address] [facility]  OB/GYN PGY-3  2023  10:57 PM

## 2023-06-06 NOTE — ANESTHESIA POSTPROCEDURE EVALUATION
Post-Op Assessment Note    CV Status:  Stable       Mental Status:  Awake and alert         Post-op block assessment: no complications      No notable events documented  Epidural catheter removed, tip intact  No complications         BP      Temp      Pulse     Resp      SpO2

## 2023-06-06 NOTE — LACTATION NOTE
This note was copied from a baby's chart  06/06/23 1170   Lactation Consultation   Reason for Consult 10   Maternal Information   Has mother  before? Yes   How long did the the mother previously breastfeed? 1 month   Previous Maternal Breastfeeding Challenges Low milk supply  (only able to get 3 to 5 ml's after one month of breast feeding)   Breasts/Nipples   Intervention Hand expression   Patient Follow-Up   Lactation Consult Status 2   Follow-Up Type Inpatient;Call as needed   Other OB Lactation Documentation    Additional Problem Noted Plans on breastfeeding/providing colostrum for the first few days due to breastfeeding history with little to no EBM with first child  (Declines assistance/RSB and D/C booklets )     Encouraged parents to call for assistance, questions, and concerns about breastfeeding  Extension provided

## 2023-06-06 NOTE — OB LABOR/OXYTOCIN SAFETY PROGRESS
Oxytocin Safety Progress Check Note - Terrance Shafer 34 y o  female MRN: [id number]    Unit/Bed#: -01 Encounter: [id number]    Dose (blake-units/min) Oxytocin: 24 blake-units/min  Contraction Frequency (minutes): 2-3  Contraction Quality: Moderate  Tachysystole: No   Cervical Dilation: 10  Dilation Complete Date: 06/05/23  Dilation Complete Time: 2008  Cervical Effacement: 100  Fetal Station: 1  Baseline Rate: 135 bpm  Fetal Heart Rate: 140 BPM  FHR Category: Category I               Vital Signs:   Vitals:    06/05/23 2001   BP: 100/57   Pulse: 97   Resp:    Temp:    SpO2:        Notes/comments:   Cervical exam as above, she is completely dilated  Starting to feel more intense rectal pressure  Attending physician Dr Justin De La Rosa notified and on her way in          Glenys Buck DO 6/5/2023 8:08 PM

## 2023-06-06 NOTE — ASSESSMENT & PLAN NOTE
Lochia WNL   Recovering well   Appropriate bowel and bladder function   Pain well controlled   Tolerating diet   Patient states she is going to formula feed  Ambulating without issues   No lower extremity tenderness  GBS negative   Rh positive

## 2023-06-06 NOTE — DISCHARGE SUMMARY
Discharge Summary - Valencia Ellison 34 y o  female MRN: [id number]    Unit/Bed#: -01 Encounter: [id number]    Admission Date: 2023     Discharge Date: 2023    Patient Active Problem List   Diagnosis   • History of benign breast tumor   • Eczema   •  (spontaneous vaginal delivery)   • Pregnancy, obstetrical care, third trimester         OBGYN Practice: [address] [place] [address] Course:   Valencia Ellison is a 34 y o  [id number] 40w4d   She presented to labor and delivery for an elective induction of labor  She was induced with Cytotec followed by a epidural placement and Ortiz balloon with Pitocin  Amniotomy was performed for clear fluid  She progressed to full cervical dilation and began pushing  She delivered a viable male  on 2023 at 2040  Weight 8lbs 9 2oz via spontaneous vaginal delivery  Apgars were 9 (1 min) and 9 (5 min)   was transferred to  nursery  Patient tolerated the procedure well and was transferred to recovery in stable condition  Her post-partum course was uncomplicated  Her post-partum pain was well controlled with oral analgesics  On day of discharge she was ambulating, voiding spontaneously, tolerating oral intake and hemodynamically stable  Mom's blood type is O positive and  Rhogam was not given  She was discharged home on postpartum day #2 without complications  Patient was instructed to follow up with her OB as an outpatient and was given appropriate warnings to call doctor or provider if she develops signs of infection or uncontrolled pain  Disposition: Home    Planned Readmission: No    Discharge Medications:   Please see AVS    Discharge instructions :   -Do not place anything (no partner, tampons or douche) in your vagina for 6 weeks    -You may walk for exercise for the first 6 weeks then gradually return to your usual activities    -Please do not drive for 1 week if you have no stitches and for 2 weeks if you have stitches or underwent a  delivery     -You may take baths or shower per your preference    -Please look at your bust (breasts) in the mirror daily and call your doctor for redness or tenderness or increased warmth  - If you have had a  section please look at your incision daily as well and call provider for increasing redness or steady drainage from the incision    -Please call your doctor's office if temperature > 100 4*F or 38* C, worsening pain or a foul discharge      Follow Up:  - Follow up in 3 weeks for postpartum visit      Leticia Alamo  23  10:12 AM

## 2023-06-06 NOTE — PROGRESS NOTES
"Progress Note - OB/GYN  David Harris 34 y o  female MRN: [id number]  Unit/Bed#: -01 Encounter: [id number]    Assessment and Plan     David Harris is a patient of: [facility]  She is PPD# 1 s/p  spontaneous vaginal delivery  Recovering well and is stable       *  (spontaneous vaginal delivery)  Assessment & Plan  Lochia WNL   Recovering well   Appropriate bowel and bladder function   Pain well controlled   Tolerating diet   Breastfeeding   Ambulating without issues   No lower extremity tenderness  GBS negative   Rh positive           Disposition    - Anticipate discharge home on PPD# 2      Subjective/Objective     Chief Complaint: Postpartum State     Subjective:    David Harris is PPD/POD#1 s/p  spontaneous vaginal delivery  She has no current complaints  Pain is well controlled  Patient is currently voiding  She is ambulating  Patient is currently passing flatus and has had no bowel movement  She is tolerating PO, and denies nausea or vomitting  Patient denies fever, chills, chest pain, shortness of breath, or calf tenderness  Lochia is normal  She is  Breastfeeding and Bottle feeding  She is recovering well and is stable         Vitals:   /62 (BP Location: Left arm)   Pulse 79   Temp 98 5 °F (36 9 °C) (Oral)   Resp 18   Ht 5' 9\" (1 753 m)   Wt 108 kg (237 lb)   LMP 2022   SpO2 98%   Breastfeeding Unknown   BMI 35 00 kg/m²       Intake/Output Summary (Last 24 hours) at 2023 0606  Last data filed at 2023 0230  Gross per 24 hour   Intake 2661 25 ml   Output 3873 ml   Net -1211 75 ml       Invasive Devices     Peripheral Intravenous Line  Duration           Peripheral IV 23 Right;Ventral (anterior) Forearm 1 day                Physical Exam:   GEN: David Harris appears well, alert and oriented x 3, pleasant and cooperative   CARDIO: RRR, no murmurs or rubs  RESP:  CTAB, no wheezes or rales  ABDOMEN: soft, no tenderness, no " distention, fundus @ -1  EXTREMITIES: SCDs on, non tender, no erythema      Labs:     Hemoglobin   Date Value Ref Range Status   06/04/2023 12 9 11 5 - 15 4 g/dL Final   03/08/2023 11 8 11 5 - 15 4 g/dL Final     Platelets   Date Value Ref Range Status   06/04/2023 225 149 - 390 Thousands/uL Final   03/08/2023 227 149 - 390 Thousands/uL Final     WBC   Date Value Ref Range Status   06/04/2023 14 97 (H) 4 31 - 10 16 Thousand/uL Final   03/08/2023 11 19 (H) 4 31 - 10 16 Thousand/uL Final          Elijah Watkins MD  6/6/2023  6:06 AM

## 2023-06-07 VITALS
HEART RATE: 72 BPM | HEIGHT: 69 IN | SYSTOLIC BLOOD PRESSURE: 123 MMHG | BODY MASS INDEX: 35.1 KG/M2 | RESPIRATION RATE: 18 BRPM | DIASTOLIC BLOOD PRESSURE: 61 MMHG | TEMPERATURE: 98.3 F | OXYGEN SATURATION: 98 % | WEIGHT: 237 LBS

## 2023-06-07 PROCEDURE — 99024 POSTOP FOLLOW-UP VISIT: CPT | Performed by: OBSTETRICS & GYNECOLOGY

## 2023-06-07 RX ORDER — IBUPROFEN 600 MG/1
600 TABLET ORAL EVERY 6 HOURS PRN
Qty: 30 TABLET | Refills: 0
Start: 2023-06-07

## 2023-06-07 RX ORDER — ACETAMINOPHEN 325 MG/1
650 TABLET ORAL EVERY 4 HOURS PRN
Refills: 0
Start: 2023-06-07

## 2023-06-07 RX ORDER — DOCUSATE SODIUM 100 MG/1
100 CAPSULE, LIQUID FILLED ORAL 2 TIMES DAILY
Refills: 0
Start: 2023-06-07

## 2023-06-07 RX ORDER — DIAPER,BRIEF,INFANT-TODD,DISP
1 EACH MISCELLANEOUS DAILY PRN
Qty: 30 G | Refills: 0
Start: 2023-06-07

## 2023-06-07 RX ADMIN — IBUPROFEN 600 MG: 600 TABLET ORAL at 04:15

## 2023-06-07 RX ADMIN — DOCUSATE SODIUM 100 MG: 100 CAPSULE, LIQUID FILLED ORAL at 09:33

## 2023-06-07 RX ADMIN — IBUPROFEN 600 MG: 600 TABLET ORAL at 09:33

## 2023-06-07 NOTE — LACTATION NOTE
This note was copied from a baby's chart  Discharge Lactation: mom states she doesn't need breastfeeding help as she is planning to dry up her milk supply and bottle feed formula  Reviewed paced bottle feeding  Reviewed formula and how to use sterile bottles  Ed  On how to dry up milk supply  Enc  s2s to bond with baby  Enc  To call lactation for any questions  Information on hand expression given  Discussed benefits of knowing how to manually express breast including stimulating milk supply, softening nipple for latch and evacuating breast in the event of engorgement  mom wanted information on how to dry up her milk supply  Provided handout from [address]  Reviewed foods/herbs mom may consume to dry up supply  Reviewed with mom how to use cabbage leaves to reduce engorgement and dry up milk supply  Feed expressed milk or formula as needed/desired  Paced bottle feeding technique is less stressful for your baby, prevents overfeeding and protects the breastfeeding relationship  You can find a video about paced bottle feeding at www lacted  org or [employer] on [employer]  Mom is encouraged to place baby skin to skin for feedings  Skin to skin education provided for baby placement on mother's chest, baby only in diaper, blankets below shoulders on baby's back  Skin to skin is encouraged to continue at home for feedings and between feedings  Provided education on growth spurts, when to introduce bottles; paced bottle feeding, and non-nutritive suck at the breast  Provided education on Signs of satiation  Encouraged to call lactation to observe a latch prior to discharge for reassurance  Encouraged to call baby and me with any questions and closely monitor output

## 2023-06-07 NOTE — PROGRESS NOTES
"Progress Note - OB/GYN  Holly Barillas 34 y o  female MRN: [id number]  Unit/Bed#:  306-01 Encounter: [id number]    Assessment and Plan     Holly Barillas is a patient of: [facility]  She is PPD# 2 s/p  spontaneous vaginal delivery of elective induction at 40wk4d  Recovering well and is stable       *  (spontaneous vaginal delivery)  Assessment & Plan  Lochia WNL   Recovering well   Appropriate bowel and bladder function   Pain well controlled   Tolerating diet   Patient states she is going to formula feed  Ambulating without issues   No lower extremity tenderness  GBS negative   Rh positive         Disposition    - Anticipate discharge home on PPD# 2      Subjective/Objective     Chief Complaint: Postpartum State     Subjective:    Holly Barillas is PPD/POD#2 s/p  spontaneous vaginal delivery  She has no current complaints  Pain is well controlled  Patient is currently voiding  She is ambulating  Patient is currently passing flatus and has had bowel movement  She is tolerating PO, and denies nausea or vomitting  Patient denies fever, chills, chest pain, shortness of breath, or calf tenderness  Lochia is normal  She is formula feeding  She is recovering well and is stable         Vitals:   /57   Pulse 88   Temp 98 °F (36 7 °C) (Oral)   Resp 20   Ht 5' 9\" (1 753 m)   Wt 108 kg (237 lb)   LMP 2022   SpO2 98%   Breastfeeding No   BMI 35 00 kg/m²     No intake or output data in the 24 hours ending 23 0554    Invasive Devices     Peripheral Intravenous Line  Duration           Peripheral IV 23 Right;Ventral (anterior) Forearm 2 days                Physical Exam:   GEN: Holly Barillas appears well, alert and oriented x 3, pleasant and cooperative   CARDIO: RRR, no murmurs or rubs  RESP:  CTAB, no wheezes or rales  ABDOMEN: soft, no tenderness, no distention, fundus @ -2  EXTREMITIES: SCDs on, non tender, no erythema      Labs:     Hemoglobin   Date " Value Ref Range Status   06/04/2023 12 9 11 5 - 15 4 g/dL Final   03/08/2023 11 8 11 5 - 15 4 g/dL Final     Platelets   Date Value Ref Range Status   06/04/2023 225 149 - 390 Thousands/uL Final   03/08/2023 227 149 - 390 Thousands/uL Final     WBC   Date Value Ref Range Status   06/04/2023 14 97 (H) 4 31 - 10 16 Thousand/uL Final   03/08/2023 11 19 (H) 4 31 - 10 16 Thousand/uL Final          Paula Randall MD  6/7/2023  5:54 AM

## 2023-06-07 NOTE — PLAN OF CARE
Problem: PAIN - ADULT  Goal: Verbalizes/displays adequate comfort level or baseline comfort level  Description: Interventions:  - Encourage patient to monitor pain and request assistance  - Assess pain using appropriate pain scale  - Administer analgesics based on type and severity of pain and evaluate response  - Implement non-pharmacological measures as appropriate and evaluate response  - Consider cultural and social influences on pain and pain management  - Notify physician/advanced practitioner if interventions unsuccessful or patient reports new pain  Outcome: Progressing     Problem: INFECTION - ADULT  Goal: Absence or prevention of progression during hospitalization  Description: INTERVENTIONS:  - Assess and monitor for signs and symptoms of infection  - Monitor lab/diagnostic results  - Monitor all insertion sites, i e  indwelling lines, tubes, and drains  - Monitor endotracheal if appropriate and nasal secretions for changes in amount and color  - [place] appropriate cooling/warming therapies per order  - Administer medications as ordered  - Instruct and encourage patient and family to use good hand hygiene technique  - Identify and instruct in appropriate isolation precautions for identified infection/condition  Outcome: Progressing  Goal: Absence of fever/infection during neutropenic period  Description: INTERVENTIONS:  - Monitor WBC    Outcome: Progressing     Problem: SAFETY ADULT  Goal: Patient will remain free of falls  Description: INTERVENTIONS:  - Educate patient/family on patient safety including physical limitations  - Instruct patient to call for assistance with activity   - Consult OT/PT to assist with strengthening/mobility   - Keep Call bell within reach  - Keep bed low and locked with side rails adjusted as appropriate  - Keep care items and personal belongings within reach  - Initiate and maintain comfort rounds  - Make Fall Risk Sign visible to staff  - Apply yellow socks and bracelet for high fall risk patients  - Consider moving patient to room near nurses station  Outcome: Progressing  Goal: Maintain or return to baseline ADL function  Description: INTERVENTIONS:  -  Assess patient's ability to carry out ADLs; assess patient's baseline for ADL function and identify physical deficits which impact ability to perform ADLs (bathing, care of mouth/teeth, toileting, grooming, dressing, etc )  - Assess/evaluate cause of self-care deficits   - Assess range of motion  - Assess patient's mobility; develop plan if impaired  - Assess patient's need for assistive devices and provide as appropriate  - Encourage maximum independence but intervene and supervise when necessary  - Involve family in performance of ADLs  - Assess for home care needs following discharge   - Consider OT consult to assist with ADL evaluation and planning for discharge  - Provide patient education as appropriate  Outcome: Progressing  Goal: Maintains/Returns to pre admission functional level  Description: INTERVENTIONS:  - Perform BMAT or MOVE assessment daily    - Set and communicate daily mobility goal to care team and patient/family/caregiver  - Collaborate with rehabilitation services on mobility goals if consulted  - Out of bed for toileting  - Record patient progress and toleration of activity level   Outcome: Progressing     Problem: Knowledge Deficit  Goal: Patient/family/caregiver demonstrates understanding of disease process, treatment plan, medications, and discharge instructions  Description: Complete learning assessment and assess knowledge base  Interventions:  - Provide teaching at level of understanding  - Provide teaching via preferred learning methods  Outcome: Progressing  Goal: Verbalizes understanding of labor plan  Description: Assess patient/family/caregiver's baseline knowledge level and ability to understand information    Provide education via patient/family/caregiver's preferred learning method at appropriate level of understanding  1  Provide teaching at level of understanding  2  Provide teaching via preferred learning method(s)  Outcome: Progressing     Problem: DISCHARGE PLANNING  Goal: Discharge to home or other facility with appropriate resources  Description: INTERVENTIONS:  - Identify barriers to discharge w/patient and caregiver  - Arrange for needed discharge resources and transportation as appropriate  - Identify discharge learning needs (meds, wound care, etc )  - Arrange for interpretive services to assist at discharge as needed  - Refer to Case Management Department for coordinating discharge planning if the patient needs post-hospital services based on physician/advanced practitioner order or complex needs related to functional status, cognitive ability, or social support system  Outcome: Progressing     Problem: BIRTH - VAGINAL/ SECTION  Goal: Fetal and maternal status remain reassuring during the birth process  Description: INTERVENTIONS:  - Monitor vital signs  - Monitor fetal heart rate  - Monitor uterine activity  - Monitor labor progression (vaginal delivery)  - DVT prophylaxis  - Antibiotic prophylaxis  Outcome: Progressing  Goal: Emotionally satisfying birthing experience for mother/fetus  Description: Interventions:  - Assess, plan, implement and evaluate the nursing care given to the patient in labor  - Advocate the philosophy that each childbirth experience is a unique experience and support the family's chosen level of involvement and control during the labor process   - Actively participate in both the patient's and family's teaching of the birth process  - Consider cultural, Religion and age-specific factors and plan care for the patient in labor  Outcome: Progressing     Problem: Labor & Delivery  Goal: Manages discomfort  Description: Assess and monitor for signs and symptoms of discomfort  Assess patient's pain level regularly and per hospital policy  Administer medications as ordered  Support use of nonpharmacological methods to help control pain such as distraction, imagery, relaxation, and application of heat and cold  Collaborate with interdisciplinary team and patient to determine appropriate pain management plan  1  Include patient in decisions related to comfort  2  Offer non-pharmacological pain management interventions  3  Report ineffective pain management to physician  Outcome: Progressing  Goal: Patient vital signs are stable  Description: 1  Assess vital signs - vaginal delivery    Outcome: Progressing

## 2023-06-07 NOTE — PLAN OF CARE
Problem: PAIN - ADULT  Goal: Verbalizes/displays adequate comfort level or baseline comfort level  Description: Interventions:  - Encourage patient to monitor pain and request assistance  - Assess pain using appropriate pain scale  - Administer analgesics based on type and severity of pain and evaluate response  - Implement non-pharmacological measures as appropriate and evaluate response  - Consider cultural and social influences on pain and pain management  - Notify physician/advanced practitioner if interventions unsuccessful or patient reports new pain  Outcome: Completed     Problem: INFECTION - ADULT  Goal: Absence or prevention of progression during hospitalization  Description: INTERVENTIONS:  - Assess and monitor for signs and symptoms of infection  - Monitor lab/diagnostic results  - Monitor all insertion sites, i e  indwelling lines, tubes, and drains  - Monitor endotracheal if appropriate and nasal secretions for changes in amount and color  - [place] appropriate cooling/warming therapies per order  - Administer medications as ordered  - Instruct and encourage patient and family to use good hand hygiene technique  - Identify and instruct in appropriate isolation precautions for identified infection/condition  Outcome: Completed  Goal: Absence of fever/infection during neutropenic period  Description: INTERVENTIONS:  - Monitor WBC    Outcome: Completed     Problem: SAFETY ADULT  Goal: Patient will remain free of falls  Description: INTERVENTIONS:  - Educate patient/family on patient safety including physical limitations  - Instruct patient to call for assistance with activity   - Consult OT/PT to assist with strengthening/mobility   - Keep Call bell within reach  - Keep bed low and locked with side rails adjusted as appropriate  - Keep care items and personal belongings within reach  - Initiate and maintain comfort rounds  - Make Fall Risk Sign visible to staff  - Apply yellow socks and bracelet for high fall risk patients  - Consider moving patient to room near nurses station  Outcome: Completed  Goal: Maintain or return to baseline ADL function  Description: INTERVENTIONS:  -  Assess patient's ability to carry out ADLs; assess patient's baseline for ADL function and identify physical deficits which impact ability to perform ADLs (bathing, care of mouth/teeth, toileting, grooming, dressing, etc )  - Assess/evaluate cause of self-care deficits   - Assess range of motion  - Assess patient's mobility; develop plan if impaired  - Assess patient's need for assistive devices and provide as appropriate  - Encourage maximum independence but intervene and supervise when necessary  - Involve family in performance of ADLs  - Assess for home care needs following discharge   - Consider OT consult to assist with ADL evaluation and planning for discharge  - Provide patient education as appropriate  Outcome: Completed  Goal: Maintains/Returns to pre admission functional level  Description: INTERVENTIONS:  - Perform BMAT or MOVE assessment daily    - Set and communicate daily mobility goal to care team and patient/family/caregiver  - Collaborate with rehabilitation services on mobility goals if consulted  - Out of bed for toileting  - Record patient progress and toleration of activity level   Outcome: Completed     Problem: Knowledge Deficit  Goal: Patient/family/caregiver demonstrates understanding of disease process, treatment plan, medications, and discharge instructions  Description: Complete learning assessment and assess knowledge base  Interventions:  - Provide teaching at level of understanding  - Provide teaching via preferred learning methods  Outcome: Completed  Goal: Verbalizes understanding of labor plan  Description: Assess patient/family/caregiver's baseline knowledge level and ability to understand information    Provide education via patient/family/caregiver's preferred learning method at appropriate level of understanding  1  Provide teaching at level of understanding  2  Provide teaching via preferred learning method(s)  Outcome: Completed     Problem: DISCHARGE PLANNING  Goal: Discharge to home or other facility with appropriate resources  Description: INTERVENTIONS:  - Identify barriers to discharge w/patient and caregiver  - Arrange for needed discharge resources and transportation as appropriate  - Identify discharge learning needs (meds, wound care, etc )  - Arrange for interpretive services to assist at discharge as needed  - Refer to Case Management Department for coordinating discharge planning if the patient needs post-hospital services based on physician/advanced practitioner order or complex needs related to functional status, cognitive ability, or social support system  Outcome: Completed     Problem: BIRTH - VAGINAL/ SECTION  Goal: Fetal and maternal status remain reassuring during the birth process  Description: INTERVENTIONS:  - Monitor vital signs  - Monitor fetal heart rate  - Monitor uterine activity  - Monitor labor progression (vaginal delivery)  - DVT prophylaxis  - Antibiotic prophylaxis  Outcome: Completed  Goal: Emotionally satisfying birthing experience for mother/fetus  Description: Interventions:  - Assess, plan, implement and evaluate the nursing care given to the patient in labor  - Advocate the philosophy that each childbirth experience is a unique experience and support the family's chosen level of involvement and control during the labor process   - Actively participate in both the patient's and family's teaching of the birth process  - Consider cultural, Ep[employer] and age-specific factors and plan care for the patient in labor  Outcome: Completed     Problem: Labor & Delivery  Goal: Manages discomfort  Description: Assess and monitor for signs and symptoms of discomfort  Assess patient's pain level regularly and per hospital policy  Administer medications as ordered  Support use of nonpharmacological methods to help control pain such as distraction, imagery, relaxation, and application of heat and cold  Collaborate with interdisciplinary team and patient to determine appropriate pain management plan  1  Include patient in decisions related to comfort  2  Offer non-pharmacological pain management interventions  3  Report ineffective pain management to physician  Outcome: Completed  Goal: Patient vital signs are stable  Description: 1  Assess vital signs - vaginal delivery    Outcome: Completed

## 2023-06-07 NOTE — UTILIZATION REVIEW
"NOTIFICATION OF INPATIENT ADMISSION   MATERNITY/DELIVERY AUTHORIZATION REQUEST   SERVICING FACILITY:   [facility], [place], NICU  [address]  Tax ID: [id number]  NPI: [id number]   ATTENDING PROVIDER:  Attending Name and NPI#: Amie Quiroz [[id number]]  Address: [address] [place] [address]  Phone: [phone]   ADMISSION INFORMATION:  Place of Service: Inpatient [address] [facility] [[email or web address]]  [address]  Place of Service Code: 21  Inpatient Admission Date/Time: 23  7:43 PM  Discharge Date/Time: 2023 12:02 PM  Admitting Diagnosis Code/Description:  40 weeks gestation of pregnancy [Z3A 40]  Encounter for induction of labor [Z34 90]  Encounter for full-term uncomplicated delivery [N06]     Mother: Capri Lay 1990 Estimated Date of Delivery: 23  Delivering clinician: Sonia Malloy    OB History        4    Para   2    Term   2       0    AB   2    Living   2       SAB   1    IAB   0    Ectopic   0    Multiple   0    Live Births   2           Obstetric Comments   Medical AB            Name & MRN:   Information for the patient's :  Derrel Ratel) [[id number]]     [place] Delivery Information:  Sex: male  Delivered 2023 8:40 PM by Vaginal, Spontaneous; Gestational Age: 36w2d    [place] Measurements:  Weight: 8 lb 9 2 oz (3890 g); Height: 21\"    APGAR 1 minute 5 minutes 10 minutes   Totals: 9 9       Birth Information: 34 y o  female MRN: [id number] Unit/Bed#: LD OVR   Birthweight: No birth weight on file  Gestational Age: <None> Delivery Type:    APGARS Totals:        UTILIZATION REVIEW CONTACT:  Scar Suarez, Utilization   Network Utilization Review Department  Phone: [phone]  Fax [phone]  Email: [email or web address]  Contact for approvals/pending authorizations, clinical reviews, and discharge       PHYSICIAN ADVISORY " SERVICES:  Medical Necessity Denial & Rkfk-ui-Rzij Review  Phone: [phone]  Fax: [phone]  Email: [email or web address]

## 2023-06-11 LAB — PLACENTA IN STORAGE: NORMAL

## 2023-06-16 ENCOUNTER — TELEPHONE (OUTPATIENT)
Dept: OBGYN CLINIC | Facility: CLINIC | Age: 33
End: 2023-06-16

## 2023-06-16 DIAGNOSIS — Z20.1 EXPOSURE TO MYCOBACTERIUM TUBERCULOSIS: Primary | ICD-10-CM

## 2023-06-26 ENCOUNTER — POSTPARTUM VISIT (OUTPATIENT)
Dept: OBGYN CLINIC | Facility: CLINIC | Age: 33
End: 2023-06-26

## 2023-06-26 VITALS — DIASTOLIC BLOOD PRESSURE: 80 MMHG | SYSTOLIC BLOOD PRESSURE: 110 MMHG | WEIGHT: 222.6 LBS | BODY MASS INDEX: 32.87 KG/M2

## 2023-06-26 PROBLEM — Z34.93 PREGNANCY, OBSTETRICAL CARE, THIRD TRIMESTER: Status: RESOLVED | Noted: 2023-04-09 | Resolved: 2023-06-26

## 2023-06-26 PROCEDURE — 99024 POSTOP FOLLOW-UP VISIT: CPT | Performed by: CLINICAL NURSE SPECIALIST

## 2023-06-26 NOTE — PROGRESS NOTES
Assessment/Plan:          1  Postpartum care following vaginal delivery        3wks PostPartum  Normal postpartum exam    The patient may advance activity as tolerated and may resume sexual activity at 6 wks PP  Contraception discussed and declined    She will RTO for routine annual gyn exam in 1-2 mos  The patient agrees with plan    Subjective:   HPI   Patient ID: Magali Ellis is a 34 y o  female  She presents for routine postpartum visit  She is now a [id number] who is 3wks s/p  on   Anesthesia: epidural  Perineum: 2nd degree laceration    Postpartum course was uncomplicated  Since D/c home she has had no complaints  She denies abn bleeding, pelvic pain, breast complaints, bowel/bladder dysfunction, depression/anx  Baby is thriving and is Bottle feeding       Plans for contraception: no method- declines discussion         The following portions of the patient's history were reviewed and updated as appropriate: allergies, current medications, past family history, past medical history, past social history, past surgical history, and problem list     Review of Systems             Objective:  /80 (BP Location: Left arm, Patient Position: Sitting, Cuff Size: Large)   Wt 101 kg (222 lb 9 6 oz)   LMP 2022   Breastfeeding No   BMI 32 87 kg/m²     Physical Exam  Constitutional:       General: She is not in acute distress  Appearance: Normal appearance  Genitourinary:      Vulva normal       Right Labia: No rash, lesions or skin changes  Left Labia: No lesions, skin changes or rash  No vaginal discharge, erythema or ulceration  Uterus is not enlarged (involuted)  Pelvic exam was performed with patient in the lithotomy position  Cardiovascular:      Rate and Rhythm: Normal rate  Pulmonary:      Effort: Pulmonary effort is normal    Abdominal:      General: There is no distension  Palpations: Abdomen is soft     Musculoskeletal:         General: Normal range of motion  Neurological:      Mental Status: She is alert and oriented to person, place, and time  Skin:     General: Skin is warm and dry     Psychiatric:         Mood and Affect: Mood normal          Behavior: Behavior normal

## 2023-06-26 NOTE — PROGRESS NOTES
Patient is here for postpartum visit    Vaginal delivery 23  Laceration: 2nd degree   incision: N/A  Bleeding: spotting   Baby's weight 8lbs/9 2oz  Gender: Boy-Baby Name [address] [place] [address]   Breast feed: no  Birth control: none  Concerns: none

## 2023-08-24 ENCOUNTER — TELEPHONE (OUTPATIENT)
Dept: OBGYN CLINIC | Facility: CLINIC | Age: 33
End: 2023-08-24

## 2023-08-24 ENCOUNTER — DOCUMENTATION (OUTPATIENT)
Age: 33
End: 2023-08-24

## 2024-03-15 ENCOUNTER — TELEPHONE (OUTPATIENT)
Dept: FAMILY MEDICINE CLINIC | Facility: CLINIC | Age: 34
End: 2024-03-15

## 2024-10-23 ENCOUNTER — OFFICE VISIT (OUTPATIENT)
Dept: OBGYN CLINIC | Facility: CLINIC | Age: 34
End: 2024-10-23
Payer: COMMERCIAL

## 2024-10-23 VITALS — WEIGHT: 182 LBS | DIASTOLIC BLOOD PRESSURE: 84 MMHG | BODY MASS INDEX: 26.88 KG/M2 | SYSTOLIC BLOOD PRESSURE: 120 MMHG

## 2024-10-23 DIAGNOSIS — Z30.42 ENCOUNTER FOR DEPO-PROVERA CONTRACEPTION: ICD-10-CM

## 2024-10-23 DIAGNOSIS — Z30.09 BIRTH CONTROL COUNSELING: Primary | ICD-10-CM

## 2024-10-23 PROCEDURE — 99213 OFFICE O/P EST LOW 20 MIN: CPT | Performed by: PHYSICIAN ASSISTANT

## 2024-10-23 RX ORDER — MEDROXYPROGESTERONE ACETATE 150 MG/ML
150 INJECTION, SUSPENSION INTRAMUSCULAR
Qty: 1 ML | Refills: 3 | Status: SHIPPED | OUTPATIENT
Start: 2024-10-23

## 2024-10-24 NOTE — PROGRESS NOTES
Ambulatory Visit  Name: Dianna Melissa      : 1990      MRN: [id number]  Encounter Provider: Azul Friedman PA-C  Encounter Date: 10/23/2024   Encounter department: [facility] [place]    Assessment & Plan  Birth control counseling  - See counseling below. The patient elects Depo-Provera at this time.  Proper usage, common side effects, when back-up contraception is needed, and precautions were reviewed.  Safe sexual practices were stressed, and condoms recommended for STD prevention.  - Given pt is on menses will have her return to office in next two days for depo initiation. Will need to provide urine sample for hcg testing when she presents to office  - rx sent to pharmacy       Encounter for Depo-Provera contraception    Orders:    medroxyPROGESTERone (DEPO-PROVERA) 150 mg/mL injection; Inject 1 mL (150 mg total) into a muscle every 3 (three) months      History of Present Illness     Dianna Melissa is a 34 y.o. female who presents to the office today for a visit to discuss contraception. She is presently using withdrawal method only for contraception. She is interested in starting a more reliable option. She is mainly interested in Depo or the patch. Menses are regular. She is presently on her menses.    Denies hx of migraine with aura, HTN. Liver or gallbladder dysfunction, personal or fhx of clot/clotting disorder. She is not a smoker    We discussed hormonal and non-hormonal options including:  barrier methods, OCPs, OrthoEvra patch, NuvaRing, Depo-Provera, Mirena, Paragard, Nexplanon, and male and female sterilization.  The risks, benefits and efficacy rates of each were reviewed.  Hormonal contraceptives have additional risk of serious and potentially fatal complications of blood clot, stroke and heart attack which are increased in smokers, particularly over age 35.    The patient elects Depo-Provera at this time.  Proper usage, common side effects, when  back-up contraception is needed, and precautions were reviewed.  Safe sexual practices were stressed, and condoms recommended for STD prevention.    All patient questions were answered at the time of visit and she was counseled to call at any time with questions/concerns.  Patient literature was provided.      History obtained from : patient  Review of Systems  Medical History Reviewed by provider this encounter:  Tobacco  Allergies  Meds  Problems  Med Hx  Surg Hx  Fam Hx  Soc   Hx          Objective     /84 (BP Location: Left arm, Patient Position: Sitting, Cuff Size: Standard)   Wt 82.6 kg (182 lb)   LMP 10/18/2024 (Exact Date)   BMI 26.88 kg/m²     Physical Exam  Vitals reviewed.   Constitutional:       Appearance: Normal appearance.   HENT:      Head: Normocephalic and atraumatic.   Pulmonary:      Effort: Pulmonary effort is normal.   Abdominal:      General: Abdomen is flat.   Skin:     General: Skin is warm and dry.   Neurological:      General: No focal deficit present.      Mental Status: She is alert. Mental status is at baseline.   Psychiatric:         Mood and Affect: Mood normal.         Behavior: Behavior normal.         Thought Content: Thought content normal.         Judgment: Judgment normal.

## 2024-10-25 ENCOUNTER — CLINICAL SUPPORT (OUTPATIENT)
Dept: OBGYN CLINIC | Facility: CLINIC | Age: 34
End: 2024-10-25
Payer: COMMERCIAL

## 2024-10-25 VITALS — SYSTOLIC BLOOD PRESSURE: 112 MMHG | DIASTOLIC BLOOD PRESSURE: 78 MMHG | WEIGHT: 182 LBS | BODY MASS INDEX: 26.88 KG/M2

## 2024-10-25 DIAGNOSIS — Z30.42 ENCOUNTER FOR DEPO-PROVERA CONTRACEPTION: Primary | ICD-10-CM

## 2024-10-25 DIAGNOSIS — Z32.02 PREGNANCY TEST NEGATIVE: ICD-10-CM

## 2024-10-25 LAB — SL AMB POCT URINE HCG: NEGATIVE

## 2024-10-25 PROCEDURE — 81025 URINE PREGNANCY TEST: CPT

## 2024-10-25 PROCEDURE — 96372 THER/PROPH/DIAG INJ SC/IM: CPT

## 2024-10-25 RX ORDER — MEDROXYPROGESTERONE ACETATE 150 MG/ML
150 INJECTION, SUSPENSION INTRAMUSCULAR ONCE
Status: COMPLETED | OUTPATIENT
Start: 2024-10-25 | End: 2024-10-25

## 2024-10-25 RX ADMIN — MEDROXYPROGESTERONE ACETATE 150 MG: 150 INJECTION, SUSPENSION INTRAMUSCULAR at 16:01

## 2024-10-25 NOTE — PROGRESS NOTES
Pt is here for depo injection   No concerns at this time    1st injection today   LMP 10/18   Given Right Deltiod per pt request   Preg. Test yes, negative   Annual 2022, scheduled 1/2025   Next Inj 1/10-1/24    NDC 09298-901-98  LOT [id number]  EXP 11/2026

## 2024-12-02 ENCOUNTER — TELEPHONE (OUTPATIENT)
Age: 34
End: 2024-12-02

## 2024-12-02 NOTE — TELEPHONE ENCOUNTER
Patient calling to report spotting to light bleeding x 13 days. Recently started Depo shot. Review break through bleeding is common side effect and can occur for 3-6 months after starting medication but should improve over time. Advised to monitor bleeding. Reviewed bleeding precaution and when to call back or seek emergency care. Patient verbalizes understanding.

## 2025-01-20 ENCOUNTER — CLINICAL SUPPORT (OUTPATIENT)
Dept: OBGYN CLINIC | Facility: CLINIC | Age: 35
End: 2025-01-20
Payer: COMMERCIAL

## 2025-01-20 VITALS — DIASTOLIC BLOOD PRESSURE: 72 MMHG | BODY MASS INDEX: 26.94 KG/M2 | SYSTOLIC BLOOD PRESSURE: 108 MMHG | WEIGHT: 182.4 LBS

## 2025-01-20 DIAGNOSIS — Z30.42 ENCOUNTER FOR DEPO-PROVERA CONTRACEPTION: Primary | ICD-10-CM

## 2025-01-20 PROCEDURE — 96372 THER/PROPH/DIAG INJ SC/IM: CPT

## 2025-01-20 RX ORDER — MEDROXYPROGESTERONE ACETATE 150 MG/ML
150 INJECTION, SUSPENSION INTRAMUSCULAR ONCE
Status: COMPLETED | OUTPATIENT
Start: 2025-01-20 | End: 2025-01-20

## 2025-01-20 RX ADMIN — MEDROXYPROGESTERONE ACETATE 150 MG: 150 INJECTION, SUSPENSION INTRAMUSCULAR at 13:38

## 2025-01-20 NOTE — PROGRESS NOTES
Pt is here for Depo Provera injection. Her last dose was 10/25/2024.  Her last office visit was on 10/23/2024 with Azul Friedman. Advise to schedule yearly exam.   Urine pregnancy test done; No, within window  Depo given in L Deltoid  Tolerated well.  Lot [id number] Exp 04/30/2027  Next dose due 04/07-04/21/2025  Refill needed: No.

## 2025-03-19 ENCOUNTER — DOCUMENTATION (OUTPATIENT)
Dept: NEUROSURGERY | Facility: CLINIC | Age: 35
End: 2025-03-19

## 2025-03-19 DIAGNOSIS — H44.001 EYE INFECTION, RIGHT: Primary | ICD-10-CM

## 2025-03-19 RX ORDER — TOBRAMYCIN AND DEXAMETHASONE 3; 1 MG/ML; MG/ML
1 SUSPENSION/ DROPS OPHTHALMIC
Qty: 5 ML | Refills: 0 | Status: SHIPPED | OUTPATIENT
Start: 2025-03-19

## 2025-04-14 DIAGNOSIS — Z30.42 ENCOUNTER FOR DEPO-PROVERA CONTRACEPTION: ICD-10-CM

## 2025-04-14 NOTE — TELEPHONE ENCOUNTER
Patient wants Depo script sent to [facility] . Patient has an appointment on Friday for the injection.

## 2025-04-15 RX ORDER — MEDROXYPROGESTERONE ACETATE 150 MG/ML
150 INJECTION, SUSPENSION INTRAMUSCULAR
Qty: 1 ML | Refills: 0 | Status: SHIPPED | OUTPATIENT
Start: 2025-04-15

## 2025-04-18 ENCOUNTER — CLINICAL SUPPORT (OUTPATIENT)
Dept: OBGYN CLINIC | Facility: CLINIC | Age: 35
End: 2025-04-18
Payer: COMMERCIAL

## 2025-04-18 VITALS
HEIGHT: 69 IN | DIASTOLIC BLOOD PRESSURE: 70 MMHG | BODY MASS INDEX: 28.88 KG/M2 | SYSTOLIC BLOOD PRESSURE: 122 MMHG | WEIGHT: 195 LBS

## 2025-04-18 DIAGNOSIS — Z30.42 ENCOUNTER FOR DEPO-PROVERA CONTRACEPTION: Primary | ICD-10-CM

## 2025-04-18 PROCEDURE — 96372 THER/PROPH/DIAG INJ SC/IM: CPT

## 2025-04-18 RX ORDER — MEDROXYPROGESTERONE ACETATE 150 MG/ML
150 INJECTION, SUSPENSION INTRAMUSCULAR ONCE
Status: COMPLETED | OUTPATIENT
Start: 2025-04-18 | End: 2025-04-18

## 2025-04-18 RX ADMIN — MEDROXYPROGESTERONE ACETATE 150 MG: 150 INJECTION, SUSPENSION INTRAMUSCULAR at 17:11

## 2025-04-18 NOTE — PROGRESS NOTES
Pt is here for Depo Provera injection. Her last dose was 01/20/2025.  Her last office visit was on 10/23/2024. Needs to schedule annual exam.   Urine pregnancy test done: No, within window.   Result: N/A  Depo given in R Deltoid (L previously)  Tolerated well.  Lot [id number] Exp 06/30/2029  Next dose due 7/4-7/18/2025  Refill needed: No.

## 2025-04-28 ENCOUNTER — OFFICE VISIT (OUTPATIENT)
Dept: URGENT CARE | Facility: CLINIC | Age: 35
End: 2025-04-28
Payer: COMMERCIAL

## 2025-04-28 ENCOUNTER — HOSPITAL ENCOUNTER (EMERGENCY)
Facility: HOSPITAL | Age: 35
Discharge: HOME | End: 2025-04-28
Attending: EMERGENCY MEDICINE
Payer: COMMERCIAL

## 2025-04-28 VITALS
DIASTOLIC BLOOD PRESSURE: 69 MMHG | RESPIRATION RATE: 20 BRPM | OXYGEN SATURATION: 99 % | SYSTOLIC BLOOD PRESSURE: 121 MMHG | TEMPERATURE: 98.1 F | HEART RATE: 80 BPM

## 2025-04-28 VITALS
RESPIRATION RATE: 16 BRPM | SYSTOLIC BLOOD PRESSURE: 135 MMHG | DIASTOLIC BLOOD PRESSURE: 79 MMHG | TEMPERATURE: 99.5 F | HEART RATE: 88 BPM | OXYGEN SATURATION: 100 %

## 2025-04-28 DIAGNOSIS — L03.211 FACIAL CELLULITIS: Primary | ICD-10-CM

## 2025-04-28 PROCEDURE — 99283 EMERGENCY DEPT VISIT LOW MDM: CPT

## 2025-04-28 PROCEDURE — 99284 EMERGENCY DEPT VISIT MOD MDM: CPT | Performed by: EMERGENCY MEDICINE

## 2025-04-28 PROCEDURE — 99203 OFFICE O/P NEW LOW 30 MIN: CPT | Performed by: NURSE PRACTITIONER

## 2025-04-28 RX ORDER — LIDOCAINE 40 MG/G
CREAM TOPICAL ONCE
Status: COMPLETED | OUTPATIENT
Start: 2025-04-28 | End: 2025-04-28

## 2025-04-28 RX ORDER — ACETAMINOPHEN 325 MG/1
975 TABLET ORAL ONCE
Status: COMPLETED | OUTPATIENT
Start: 2025-04-28 | End: 2025-04-28

## 2025-04-28 RX ADMIN — LIDOCAINE 1 APPLICATION: 40 CREAM TOPICAL at 19:25

## 2025-04-28 RX ADMIN — CEPHALEXIN 500 MG: 250 CAPSULE ORAL at 19:24

## 2025-04-28 RX ADMIN — ACETAMINOPHEN 975 MG: 325 TABLET, FILM COATED ORAL at 18:57

## 2025-04-28 NOTE — LETTER
April 28, 2025     Patient: Dianna Melissa   YOB: 1990   Date of Visit: 4/28/2025       To Whom it May Concern:    Dianna Melissa was seen in my clinic on 4/28/2025. She may return to work on 4/30/2025 .    If you have any questions or concerns, please don't hesitate to call.         Sincerely,          LAKEISHA Rocha        CC: No Recipients

## 2025-04-28 NOTE — DISCHARGE INSTRUCTIONS
Dear Dianna Melissa,     It was our pleasure to care for you here at [facility]. It was an honor and privilege to be part of your health care team. Please review this entire after visit summary and read your personalized discharge instructions below:  Please follow up with your PCP within 1 week or as soon as possible.  Please take Tylenol and Ibuprofen as needed. I have also prescribed Keflex, this is a antibiotic to treat facial cellulitis, please take 500 mg 4 times a day for 7 days.  Please return to the ER if your symptoms worsen or fail to improve, if you experience numbness, tingling, increased swelling, or if you experience anything concerning to you.    Sincerely,     Michael Rader MD

## 2025-04-28 NOTE — PATIENT INSTRUCTIONS
Augmentin twice a day for 10 days   Continue ibuprofen for pain   Monitor for fever, increase in facial swelling, increase in redness- proceed to the ER  Follow up with your PCP as needed    Patient Education     Cellulitis and erysipelas (skin infections)   The Basics   Written by the doctors and editors at [facility]   What are cellulitis and erysipelas? -- Cellulitis and erysipelas are both infections of the skin. These infections can cause redness, pain, and swelling. The difference between them is that erysipelas tends to affect the upper layers of skin, and cellulitis tends to affect deep layers of skin and sometimes the fat under the skin.  Cellulitis and erysipelas can happen when germs get into the skin. Normally, different types of germs live on a person's skin. Most of the time, these germs do not cause any problems. But if a person gets a cut or a break in the skin, the germs can get into the skin and cause an infection.  Certain conditions can increase a person's chance of getting cellulitis or erysipelas. These include:   Having a cut (even a tiny one)   Having another type of skin infection or a long-term skin condition   Having swelling of the skin or swelling in the body   Being overweight  What are the symptoms of cellulitis and erysipelas? -- Both types of infection cause very similar symptoms. Either infection can cause the infected area to be painful, red, swollen, or warm. Some people with cellulitis or erysipelas can sometimes also have fever or chills. And sometimes, people with these infections have no symptoms or only some of these symptoms.  Most of the time, cellulitis and erysipelas happen on the legs or arms. But people can get these infections in other places, such as the belly, face, in the mouth, or around the anus.  Will I need tests? -- Most people do not need any tests. Your doctor or nurse will do an exam and look at your skin.  It's important for a doctor or nurse to do an exam to  "figure out what kind of infection you have. The right treatment for a skin infection depends on the type of infection it is and which germs are causing it. Your doctor or nurse might need to do tests to figure out the cause of your infection.  If you have cellulitis or erysipelas, it's important to get treated. These infections can spread to the whole body and become serious if not treated.  How are cellulitis and erysipelas treated? -- These infections are treated with antibiotic pills. If your doctor prescribes medicine for you to take at home, it is important to follow the directions exactly. Take all of the pills you are given, even if you feel better before you finish them. If you do not take all the pills, the infection can come back worse.  People who have severe infections might be treated in the hospital and given antibiotics through a thin tube that goes into a vein, called an \"IV\".  What can I do to help treat my infection? -- You can:   Raise your arm or leg (if your infection is on your arm or leg) to reduce swelling - Raise the arm or leg up above the level of your heart 3 or 4 times a day, for 30 minutes each time.   Keep the infected area clean and dry - You can take a shower or bath, but be sure to pat the area dry with a towel afterward.  Antibiotic ointments or creams do not work to treat cellulitis and erysipelas.  Should I call my doctor or nurse? -- You should call your doctor or nurse if your symptoms do not get better within 3 days of starting treatment. You should also call if the affected area gets:   Bigger   More swollen   More painful  Your doctor or nurse might do another exam or tests to see if you need different medicines.  Can skin infections be prevented? -- Sometimes. If you cut your skin, make sure to wash the area well with soap and water. This can help prevent the area from getting infected. If you have a long-term skin condition, ask your doctor or nurse what you can do to help " prevent infections.  All topics are updated as new evidence becomes available and our peer review process is complete.  This topic retrieved from [employer] on: Feb 26, 2024.  Topic 22177 Version 15.0  Release: 32.2.4 - C32.56  © 2024 UpToDate, Inc. and/or its affiliates. All rights reserved.  Consumer Information Use and Disclaimer   Disclaimer: This generalized information is a limited summary of diagnosis, treatment, and/or medication information. It is not meant to be comprehensive and should be used as a tool to help the user understand and/or assess potential diagnostic and treatment options. It does NOT include all information about conditions, treatments, medications, side effects, or risks that may apply to a specific patient. It is not intended to be medical advice or a substitute for the medical advice, diagnosis, or treatment of a health care provider based on the health care provider's examination and assessment of a patient's specific and unique circumstances. Patients must speak with a health care provider for complete information about their health, medical questions, and treatment options, including any risks or benefits regarding use of medications. This information does not endorse any treatments or medications as safe, effective, or approved for treating a specific patient. UpToDate, Inc. and its affiliates disclaim any warranty or liability relating to this information or the use thereof.The use of this information is governed by the Terms of Use, available at https://www.wolters"Carm[employer],"uwer.com/en/know/clinical-effectiveness-terms. 2024© UpToDate, Inc. and its affiliates and/or licensors. All rights reserved.  Copyright   © 2024 UpToDate, Inc. and/or its affiliates. All rights reserved.

## 2025-04-28 NOTE — ED PROVIDER NOTES
Time reflects when diagnosis was documented in both MDM as applicable and the Disposition within this note       Time User Action Codes Description Comment    2025  7:06 PM Michael Rader Add [L03.211] Facial cellulitis           ED Disposition       ED Disposition   Discharge    Condition   Stable    Date/Time     7:06 PM    Comment   Dianna FORTE Melissa discharge to home/self care.                   Assessment & Plan       Medical Decision Making  Patient seen and examined. Physical exam is notable for tenderness below nose on right side of face, right-sided facial swelling. Remainder of exam is within normal limits.    Differential: Facial cellulitis, abscess    Appropriate labs and imaging ordered. Pain control with Tylenol and topical lidocaine.    Patient is agreeable to discharge home with follow-up with PCP and transitioning antibiotic from Augmentin to Keflex. All questions answered and return precautions discussed.       Risk  OTC drugs.  Prescription drug management.             Medications   acetaminophen (TYLENOL) tablet 975 mg (975 mg Oral Given 25)   cephalexin (KEFLEX) capsule 500 mg (500 mg Oral Given 25)   lidocaine (LMX) 4 % cream (1 Application Topical Given 25)       ED Risk Strat Scores                    No data recorded                            History of Present Illness       Chief Complaint   Patient presents with    Facial Swelling     Pt reports she noticed a pimple on Saturday. States she started with facial swelling to lip and lower nose  evening. Reports the swelling and pain is gradually getting worse. +body aches, +chills. Took one dose of Augmentin at 1300, benadryl at 1600 today. Airway intact.       Past Medical History:   Diagnosis Date    Chlamydia infection     Eczema       Past Surgical History:   Procedure Laterality Date    BREAST SURGERY Left     benign mass age 18     INDUCED       [facility], 10 years ago     WISDOM TOOTH EXTRACTION        Family History   Problem Relation Age of Onset    Nephrolithiasis Mother     Hypertension Father     Obesity Father     Depression Father     No Known Problems Sister     Nephrolithiasis Maternal Grandmother     Hypotension Maternal Grandfather     Melanoma Maternal Grandfather     Stroke Paternal Grandmother 70    Hypertension Paternal Grandmother     Heart disease Paternal Grandmother     Heart attack Paternal Grandfather 70    No Known Problems Daughter       Social History     Tobacco Use    Smoking status: Former     Current packs/day: 0.00     Average packs/day: 1 pack/day for 3.0 years (3.0 ttl pk-yrs)     Types: Cigarettes     Start date:      Quit date: 2014     Years since quittin.3     Passive exposure: Past    Smokeless tobacco: Former   Vaping Use    Vaping status: Never Used   Substance Use Topics    Alcohol use: Yes     Comment: social     Drug use: Not Currently     Types: Marijuana     Comment: last used marijuana-       E-Cigarette/Vaping    E-Cigarette Use Never User       E-Cigarette/Vaping Substances    Nicotine No     THC No     CBD No     Flavoring No     Other No     Unknown No       I have reviewed and agree with the history as documented.     Dianna Melissa is a 35 y.o. female     They presented to the emergency department on 2025. Patient presents with:  Facial Swelling: Pt reports she noticed a pimple on Saturday. States she started with facial swelling to lip and lower nose  evening. Reports the swelling and pain is gradually getting worse. +body aches, +chills. Took one dose of Augmentin at 1300, benadryl at 1600 today. Airway intact.  .    The patient states that she started with a pimple on the right side of her face between her nose and her lip on Saturday.  She noticed some swelling on  that worsened when she woke up on Monday.  Swelling is worsening throughout the day.  She saw a doctor at urgent care was  prescribed Augmentin.  She took 1 dose of Augmentin at 1 PM, she took Benadryl at 4 PM and ibuprofen at 5 PM.  She has noticed increased swelling since she was saw the urgent care doctor so she came to the ER.  She endorses chills but denies fevers patient denies neck pain, trouble breathing, difficulty swallowing, chest pain, shortness of breath, headache, lightheadedness, dizziness, or any other complaint at this time.            History provided by:  Patient      Review of Systems   Constitutional:  Negative for chills, diaphoresis and fever.   HENT:  Positive for facial swelling. Negative for congestion, ear pain, postnasal drip, rhinorrhea and sore throat.    Eyes:  Negative for pain and visual disturbance.   Respiratory:  Negative for cough, chest tightness and shortness of breath.    Cardiovascular:  Negative for chest pain and palpitations.   Gastrointestinal:  Negative for abdominal pain, constipation, diarrhea, nausea and vomiting.   Genitourinary:  Negative for dysuria and hematuria.   Musculoskeletal:  Negative for arthralgias and back pain.   Skin:  Negative for color change and rash.   Neurological:  Negative for dizziness, seizures, syncope, weakness, light-headedness, numbness and headaches.   All other systems reviewed and are negative.          Objective       ED Triage Vitals [04/28/25 1835]   Temperature Pulse Blood Pressure Respirations SpO2 Patient Position - Orthostatic VS   99.5 °F (37.5 °C) 88 135/79 16 100 % Sitting      Temp Source Heart Rate Source BP Location FiO2 (%) Pain Score    Oral Monitor Right arm -- 8      Vitals      Date and Time Temp Pulse SpO2 Resp BP Pain Score FACES Pain Rating User   04/28/25 1857 -- -- -- -- -- 7 -- KB   04/28/25 1835 99.5 °F (37.5 °C) 88 100 % 16 135/79 8 -- RL            Physical Exam  Constitutional:       General: She is not in acute distress.     Appearance: She is not diaphoretic.   HENT:      Head: Normocephalic and atraumatic.      Jaw: No trismus  or tenderness.      Salivary Glands: Right salivary gland is not tender. Left salivary gland is not tender.      Comments: Pimple under nose and right side of face swelling of the nasolabial fold and extending up to the right lower eyelid and down to the jawline with mild overlying erythema on the nasolabial fold     Nose: No congestion or rhinorrhea.      Mouth/Throat:      Mouth: Mucous membranes are moist.      Pharynx: No oropharyngeal exudate.   Eyes:      General: No scleral icterus.  Cardiovascular:      Rate and Rhythm: Normal rate and regular rhythm.      Heart sounds: Normal heart sounds. No murmur heard.     No friction rub. No gallop.   Pulmonary:      Effort: No respiratory distress.      Breath sounds: Normal breath sounds. No wheezing, rhonchi or rales.   Abdominal:      General: Abdomen is flat. There is no distension.      Palpations: Abdomen is soft.      Tenderness: There is no abdominal tenderness. There is no guarding.   Lymphadenopathy:      Cervical: No cervical adenopathy.   Skin:     General: Skin is warm and dry.      Capillary Refill: Capillary refill takes less than 2 seconds.      Findings: No rash.   Neurological:      General: No focal deficit present.      Mental Status: She is alert and oriented to person, place, and time.         Results Reviewed       None            No orders to display       Procedures    ED Medication and Procedure Management   Prior to Admission Medications   Prescriptions Last Dose Informant Patient Reported? Taking?   acetaminophen (TYLENOL) 325 mg tablet  Self No No   Sig: Take 2 tablets (650 mg total) by mouth every 4 (four) hours as needed for mild pain   Patient not taking: Reported on 4/28/2025   amoxicillin-clavulanate (AUGMENTIN) 875-125 mg per tablet   No No   Sig: Take 1 tablet by mouth every 12 (twelve) hours for 10 days   ibuprofen (MOTRIN) 600 mg tablet  Self No No   Sig: Take 1 tablet (600 mg total) by mouth every 6 (six) hours as needed for mild  pain   Patient not taking: Reported on 10/23/2024   medroxyPROGESTERone (DEPO-PROVERA) 150 mg/mL injection   No No   Sig: Inject 1 mL (150 mg total) into a muscle every 3 (three) months   tobramycin-dexamethasone (TOBRADEX) ophthalmic suspension   No No   Sig: Administer 1 drop to the right eye every 4 (four) hours while awake   Patient not taking: Reported on 4/28/2025      Facility-Administered Medications: None     Discharge Medication List as of 4/28/2025  7:18 PM        START taking these medications    Details   cephalexin (KEFLEX) 250 mg capsule Take 2 capsules (500 mg total) by mouth 4 (four) times a day for 7 days, Starting Mon 4/28/2025, Until Mon 5/5/2025, Normal           CONTINUE these medications which have NOT CHANGED    Details   acetaminophen (TYLENOL) 325 mg tablet Take 2 tablets (650 mg total) by mouth every 4 (four) hours as needed for mild pain, Starting Wed 6/7/2023, No Print      ibuprofen (MOTRIN) 600 mg tablet Take 1 tablet (600 mg total) by mouth every 6 (six) hours as needed for mild pain, Starting Wed 6/7/2023, No Print      medroxyPROGESTERone (DEPO-PROVERA) 150 mg/mL injection Inject 1 mL (150 mg total) into a muscle every 3 (three) months, Starting Tue 4/15/2025, Normal      tobramycin-dexamethasone (TOBRADEX) ophthalmic suspension Administer 1 drop to the right eye every 4 (four) hours while awake, Starting Wed 3/19/2025, Normal      amoxicillin-clavulanate (AUGMENTIN) 875-125 mg per tablet Take 1 tablet by mouth every 12 (twelve) hours for 10 days, Starting Mon 4/28/2025, Until Thu 5/8/2025, Normal           No discharge procedures on file.  ED SEPSIS DOCUMENTATION   Time reflects when diagnosis was documented in both MDM as applicable and the Disposition within this note       Time User Action Codes Description Comment    4/28/2025  7:06 PM Michael Rader Add [L03.211] Facial cellulitis                  Michael Rader MD  04/28/25 7429

## 2025-04-28 NOTE — PROGRESS NOTES
[facility]        NAME: Dianna Melissa is a 35 y.o. female  : 1990    MRN: [id number]  DATE: 2025  TIME: 10:38 AM    Assessment and Plan   Facial cellulitis [L03.211]  1. Facial cellulitis  amoxicillin-clavulanate (AUGMENTIN) 875-125 mg per tablet            Patient Instructions   Augmentin twice a day for 10 days   Continue ibuprofen for pain   Monitor for fever, increase in facial swelling, increase in redness- proceed to the ER  Follow up with your PCP as needed    Follow up with PCP in 3-5 days.  Proceed to  ER if symptoms worsen.    Chief Complaint     Chief Complaint   Patient presents with    Facial Swelling     Started with pimple in nose 2 days ago. Used warm compresses. Then started with pain shooting into teeth. Last night upper lip started swelling. Now having R sided facial swelling and redness and swelling under R eye Took Advil and zyrtec with no relief. States of some discomfort. Swallowing with no difficulty. No resp issues.          History of Present Illness       Patient is a 35-year-old female presenting with a pimple in the right side of her nostril.  She states it started 2 days ago.  She states that she developed some facial swelling last night.  In addition to having pain radiating from her nose down into her upper teeth.  She denies any dental pain when chewing or eating.  No intraoral swelling.  No fevers or chills.  She has been applying warm compresses.  She took Zyrtec and ibuprofen with no relief.          Review of Systems   Review of Systems   Constitutional:  Negative for activity change, chills and fever.   HENT:  Positive for facial swelling. Negative for congestion, ear pain, nosebleeds, postnasal drip, rhinorrhea and sore throat.    Respiratory:  Negative for cough.    Skin:  Negative for rash.         Current Medications       Current Outpatient Medications:     amoxicillin-clavulanate (AUGMENTIN) 875-125 mg per tablet, Take 1 tablet by mouth  every 12 (twelve) hours for 10 days, Disp: 20 tablet, Rfl: 0    medroxyPROGESTERone (DEPO-PROVERA) 150 mg/mL injection, Inject 1 mL (150 mg total) into a muscle every 3 (three) months, Disp: 1 mL, Rfl: 0    acetaminophen (TYLENOL) 325 mg tablet, Take 2 tablets (650 mg total) by mouth every 4 (four) hours as needed for mild pain (Patient not taking: Reported on 2025), Disp: , Rfl: 0    ibuprofen (MOTRIN) 600 mg tablet, Take 1 tablet (600 mg total) by mouth every 6 (six) hours as needed for mild pain (Patient not taking: Reported on 10/23/2024), Disp: 30 tablet, Rfl: 0    tobramycin-dexamethasone (TOBRADEX) ophthalmic suspension, Administer 1 drop to the right eye every 4 (four) hours while awake (Patient not taking: Reported on 2025), Disp: 5 mL, Rfl: 0    Current Allergies     Allergies as of 2025    (No Known Allergies)            The following portions of the patient's history were reviewed and updated as appropriate: allergies, current medications, past family history, past medical history, past social history, past surgical history and problem list.     Past Medical History:   Diagnosis Date    Chlamydia infection     Eczema        Past Surgical History:   Procedure Laterality Date    BREAST SURGERY Left 2008    benign mass age 18     INDUCED       [facility], 10 [facility]    WISDOM TOOTH EXTRACTION         Family History   Problem Relation Age of Onset    Nephrolithiasis Mother     Hypertension Father     Obesity Father     Depression Father     No Known Problems Sister     Nephrolithiasis Maternal Grandmother     Hypotension Maternal Grandfather     Melanoma Maternal Grandfather     Stroke Paternal Grandmother 70    Hypertension Paternal Grandmother     Heart disease Paternal Grandmother     Heart attack Paternal Grandfather 70    No Known Problems Daughter          Medications have been verified.        Objective   /69   Pulse 80   Temp 98.1 °F (36.7 °C) (Temporal)   Resp 20   LMP  03/27/2025 (Approximate)   SpO2 99%        Physical Exam     Physical Exam  Vitals reviewed.   Constitutional:       General: She is awake. She is not in acute distress.     Appearance: Normal appearance. She is normal weight.   HENT:      Head: Normocephalic.      Nose: Nose normal.        Mouth/Throat:      Lips: Pink.      Pharynx: Oropharynx is clear.   Cardiovascular:      Rate and Rhythm: Normal rate.   Pulmonary:      Effort: Pulmonary effort is normal.   Skin:     General: Skin is warm and moist.   Neurological:      General: No focal deficit present.      Mental Status: She is alert and oriented to person, place, and time.   Psychiatric:         Behavior: Behavior is cooperative.

## 2025-04-28 NOTE — Clinical Note
Dianna Melissa was seen and treated in our emergency department on 4/28/2025.    No restrictions            Diagnosis:     Dianna  .    She may return on this date: 05/01/2025         If you have any questions or concerns, please don't hesitate to call.      Hamilton Presley, DO    ______________________________           _______________          _______________  Hospital Representative                              Date                                Time

## 2025-04-29 ENCOUNTER — TELEPHONE (OUTPATIENT)
Dept: FAMILY MEDICINE CLINIC | Facility: CLINIC | Age: 35
End: 2025-04-29

## 2025-04-29 NOTE — ED ATTENDING ATTESTATION
4/28/2025  IHamilton DO, saw and evaluated the patient. I have discussed the patient with the resident/non-physician practitioner and agree with the resident's/non-physician practitioner's findings, Plan of Care, and MDM as documented in the resident's/non-physician practitioner's note, except where noted. All available labs and Radiology studies were reviewed.  I was present for key portions of any procedure(s) performed by the resident/non-physician practitioner and I was immediately available to provide assistance.       At this point I agree with the current assessment done in the Emergency Department.  I have conducted an independent evaluation of this patient a history and physical is as follows: 35-year-old female, past medical history per chart, presenting to the emergency department with initial papule that developed at the base of the right nare few days prior which subsequently caused local swelling that is migrated to the right inferior aspect of the cheek over the last few days.  Patient with questionable chills but no overt fever, fatigue, or any other systemic sign or symptom.  Patient presented to an urgent care today and was provided Augmentin and represented to the emergency department if there is no clinical change in the swelling and pain worsened.  Patient denies any trismus, pain with range of motion with eye.  Significant other at bedside.    Vital sign stable.  Patient resting comfortably.  Alert and oriented and not ill-appearing.  Tenderness, induration, and mild swelling inferior to the base of the right nare, extending slightly onto the upper margin of the right aspect of the lip, and slightly over the right cheek.  No overt local inframandibular nor infra-auricular tenderness or swelling.  No trismus.  Anterior neck without any tenderness.  Extraocular motion intact without limitation or pain.  No infraorbital swelling or tenderness.  Overlying the right maxilla, slight swelling  and tenderness.    MDM: 35-year-old female presenting to the emergency department with local cellulitis as described.  Bedside ultrasound utilized and mild cobblestoning at the inferior aspect of the right nare Alding not extending towards the cheek.  No overt collection suggestive of abscess in this area.  No signs of orbital cellulitis no jaw involvement.  No lymphadenopathy.  Labs discussed with patient but ultimately well-appearing with normal vital signs and unlikely to change course of care.  Augmentin discontinued in favor of Keflex first dose provided here course of the same.  Lidocaine cream provided for local application underneath the right nare for pain control.  Patient took NSAID prior to arrival and is will continue with the same.  Expectations discussed as far as the potential for slight worsening of symptoms over the next 24 hours with improvement to be experienced towards over 48. Reviewed all findings both relevant and incidental with the patient at bedside. Pt verbalized understanding of findings, neccesary follow up, return to ED precautions. Pt agreed to review today's findings with their primary care provider. Pt non-toxic appearing upon discharge.       ED Course         Critical Care Time  Procedures

## 2025-05-01 ENCOUNTER — OFFICE VISIT (OUTPATIENT)
Dept: FAMILY MEDICINE CLINIC | Facility: CLINIC | Age: 35
End: 2025-05-01
Payer: COMMERCIAL

## 2025-05-01 VITALS
TEMPERATURE: 98.2 F | BODY MASS INDEX: 28.29 KG/M2 | RESPIRATION RATE: 16 BRPM | DIASTOLIC BLOOD PRESSURE: 68 MMHG | HEART RATE: 83 BPM | HEIGHT: 69 IN | OXYGEN SATURATION: 100 % | SYSTOLIC BLOOD PRESSURE: 110 MMHG | WEIGHT: 191 LBS

## 2025-05-01 DIAGNOSIS — Z00.8 HEALTH EXAMINATION IN POPULATION SURVEYS: ICD-10-CM

## 2025-05-01 DIAGNOSIS — E55.9 VITAMIN D DEFICIENCY: ICD-10-CM

## 2025-05-01 DIAGNOSIS — L03.211 CELLULITIS OF FACE: Primary | ICD-10-CM

## 2025-05-01 PROCEDURE — 99214 OFFICE O/P EST MOD 30 MIN: CPT | Performed by: FAMILY MEDICINE

## 2025-05-01 RX ORDER — GINSENG 100 MG
1 CAPSULE ORAL 2 TIMES DAILY
Qty: 28 G | Refills: 1 | Status: SHIPPED | OUTPATIENT
Start: 2025-05-01

## 2025-05-01 RX ORDER — GINSENG 100 MG
1 CAPSULE ORAL 2 TIMES DAILY
Qty: 28 G | Refills: 1 | Status: SHIPPED | OUTPATIENT
Start: 2025-05-01 | End: 2025-05-01 | Stop reason: SDUPTHER

## 2025-05-01 NOTE — PROGRESS NOTES
Assessment/Plan:     Assessment & Plan  Cellulitis of face  Continue Keflex as directed  Reviewed signs/symptoms that should prompt her to call or go to the ER  Wound care reviewed  Prescription Bacitracin   Orders:  •  bacitracin topical ointment 500 units/g topical ointment; Apply 1 large application topically 2 (two) times a day    Health examination in population surveys  Update labs - results via [facility]   Orders:  •  Vitamin D 25 hydroxy; Future  •  Comprehensive metabolic panel; Future  •  Lipid panel; Future  •  Hemoglobin A1C; Future  •  CBC and differential; Future    Vitamin D deficiency  Update labs - results via MyChart   Orders:  Orders:  •  Vitamin D 25 hydroxy; Future  •  Comprehensive metabolic panel; Future  •  Lipid panel; Future  •  Hemoglobin A1C; Future  •  CBC and differential; Future         There are no Patient Instructions on file for this visit.  No follow-ups on file.  Subjective:    GRISELDA Bustamante is a 35 y.o. female who presents with:  Chief Complaint    Follow-up       HPI     Follow-up     Additional comments: F/u from ER for facial cellulitis on 4/28.           Last edited by Africa Love LPN on 5/1/2025  1:06 PM.        ---Above per clinical staff & reviewed. ---        Today:    ER follow up   Saturday 5 days ago had pimple in her nose   The next day pain and numbness   Then whole side of face was swollen   Augmentin prescription and started   ED stopped that and started Keflex  Told her 48 hours to improve   Uesterday was able to pop it   Firm area  Scab in there     She showed me pictures from the past week - improving nicely     The following portions of the patient's history were reviewed and updated as appropriate: allergies, current medications, past family history, past medical history, past social history, past surgical history and problem list.  Review of Systems  ROS:  all others negative - no chest pain, SOB, normal urine and bowels. no GERD. sleeping well. mood  "good.   Objective:    /68   Pulse 83   Temp 98.2 °F (36.8 °C) (Temporal)   Resp 16   Ht 5' 9\" (1.753 m)   Wt 86.6 kg (191 lb)   LMP 03/27/2025 (Approximate)   SpO2 100%   BMI 28.21 kg/m²   Wt Readings from Last 3 Encounters:   05/01/25 86.6 kg (191 lb)   04/18/25 88.5 kg (195 lb)   01/20/25 82.7 kg (182 lb 6.4 oz)     BP Readings from Last 3 Encounters:   05/01/25 110/68   04/28/25 135/79   04/28/25 121/69       Current Medications:  Current Outpatient Medications   Medication Sig Dispense Refill   • bacitracin topical ointment 500 units/g topical ointment Apply 1 large application topically 2 (two) times a day 28 g 1   • cephalexin (KEFLEX) 250 mg capsule Take 2 capsules (500 mg total) by mouth 4 (four) times a day for 7 days 56 capsule 0   • medroxyPROGESTERone (DEPO-PROVERA) 150 mg/mL injection Inject 1 mL (150 mg total) into a muscle every 3 (three) months 1 mL 0   • acetaminophen (TYLENOL) 325 mg tablet Take 2 tablets (650 mg total) by mouth every 4 (four) hours as needed for mild pain (Patient not taking: Reported on 4/28/2025)  0     No current facility-administered medications for this visit.        Physical Exam   Constitutional: she appears well-developed and well-nourished.   HENT: Head: Normocephalic.   Right Ear: External ear normal. Tympanic membrane normal.   Left Ear: External ear normal. Tympanic membrane normal.   Nose: Nose normal. No mucosal edema, No rhinorrhea.   Right sinus exhibits no maxillary sinus tenderness.   Left sinus exhibits no maxillary sinus tenderness.   Mouth/Throat: Oropharynx is clear and moist.   Eyes: Normal conjunctiva.  No erythema. No discharge.  Neck: No pain on exam. Neck supple.   Cardiovascular: Normal rate, regular rhythm and normal heart sounds.    Pulmonary/Chest: Effort normal and breath sounds normal.   Abdominal: Soft. Bowel sounds are normal. There is no tenderness.   Lymphadenopathy: she has no cervical adenopathy.   Neurological: she is alert and " oriented to person, place, and time.   Skin: Skin is warm and dry.   Psychiatric: she has a normal mood and affect. her behavior is normal.

## 2025-07-07 DIAGNOSIS — Z30.42 ENCOUNTER FOR DEPO-PROVERA CONTRACEPTION: ICD-10-CM

## 2025-07-07 NOTE — TELEPHONE ENCOUNTER
Patient has depo appointment 7/9 needs prescription sent to [facility] in [place].

## 2025-07-08 RX ORDER — MEDROXYPROGESTERONE ACETATE 150 MG/ML
150 INJECTION, SUSPENSION INTRAMUSCULAR
Qty: 1 ML | Refills: 0 | Status: SHIPPED | OUTPATIENT
Start: 2025-07-08

## 2025-07-09 ENCOUNTER — CLINICAL SUPPORT (OUTPATIENT)
Dept: OBGYN CLINIC | Facility: CLINIC | Age: 35
End: 2025-07-09
Payer: COMMERCIAL

## 2025-07-09 VITALS
OXYGEN SATURATION: 99 % | WEIGHT: 192.6 LBS | HEIGHT: 69 IN | RESPIRATION RATE: 16 BRPM | DIASTOLIC BLOOD PRESSURE: 76 MMHG | HEART RATE: 67 BPM | SYSTOLIC BLOOD PRESSURE: 124 MMHG | BODY MASS INDEX: 28.53 KG/M2

## 2025-07-09 DIAGNOSIS — Z30.42 ENCOUNTER FOR DEPO-PROVERA CONTRACEPTION: Primary | ICD-10-CM

## 2025-07-09 PROCEDURE — 96372 THER/PROPH/DIAG INJ SC/IM: CPT

## 2025-07-09 RX ORDER — MEDROXYPROGESTERONE ACETATE 150 MG/ML
150 INJECTION, SUSPENSION INTRAMUSCULAR ONCE
Status: COMPLETED | OUTPATIENT
Start: 2025-07-09 | End: 2025-07-09

## 2025-07-09 RX ADMIN — MEDROXYPROGESTERONE ACETATE 150 MG: 150 INJECTION, SUSPENSION INTRAMUSCULAR at 16:02
